# Patient Record
Sex: MALE | Race: WHITE | NOT HISPANIC OR LATINO | Employment: OTHER | URBAN - NONMETROPOLITAN AREA
[De-identification: names, ages, dates, MRNs, and addresses within clinical notes are randomized per-mention and may not be internally consistent; named-entity substitution may affect disease eponyms.]

---

## 2017-10-28 ENCOUNTER — APPOINTMENT (OUTPATIENT)
Dept: CT IMAGING | Facility: HOSPITAL | Age: 37
End: 2017-10-28

## 2017-10-28 ENCOUNTER — HOSPITAL ENCOUNTER (EMERGENCY)
Facility: HOSPITAL | Age: 37
Discharge: ANOTHER HOSPITAL | End: 2017-10-28
Admitting: NURSE PRACTITIONER

## 2017-10-28 ENCOUNTER — APPOINTMENT (OUTPATIENT)
Dept: GENERAL RADIOLOGY | Facility: HOSPITAL | Age: 37
End: 2017-10-28

## 2017-10-28 VITALS
TEMPERATURE: 98.1 F | BODY MASS INDEX: 23.52 KG/M2 | HEIGHT: 71 IN | SYSTOLIC BLOOD PRESSURE: 149 MMHG | DIASTOLIC BLOOD PRESSURE: 91 MMHG | WEIGHT: 168 LBS | HEART RATE: 86 BPM | OXYGEN SATURATION: 95 % | RESPIRATION RATE: 20 BRPM

## 2017-10-28 DIAGNOSIS — S02.113A: ICD-10-CM

## 2017-10-28 DIAGNOSIS — S02.32XA CLOSED FRACTURE OF LEFT ORBITAL FLOOR, INITIAL ENCOUNTER (HCC): Primary | ICD-10-CM

## 2017-10-28 LAB
ALBUMIN SERPL-MCNC: 3.9 G/DL (ref 3.4–4.8)
ALBUMIN/GLOB SERPL: 1.3 G/DL (ref 1.1–1.8)
ALP SERPL-CCNC: 62 U/L (ref 38–126)
ALT SERPL W P-5'-P-CCNC: 51 U/L (ref 21–72)
ANION GAP SERPL CALCULATED.3IONS-SCNC: 10 MMOL/L (ref 5–15)
AST SERPL-CCNC: 37 U/L (ref 17–59)
BASOPHILS # BLD AUTO: 0.03 10*3/MM3 (ref 0–0.2)
BASOPHILS NFR BLD AUTO: 0.4 % (ref 0–2)
BILIRUB SERPL-MCNC: 0.4 MG/DL (ref 0.2–1.3)
BUN BLD-MCNC: 9 MG/DL (ref 7–21)
BUN/CREAT SERPL: 15.5 (ref 7–25)
CALCIUM SPEC-SCNC: 8.9 MG/DL (ref 8.4–10.2)
CHLORIDE SERPL-SCNC: 107 MMOL/L (ref 95–110)
CO2 SERPL-SCNC: 25 MMOL/L (ref 22–31)
CREAT BLD-MCNC: 0.58 MG/DL (ref 0.7–1.3)
DEPRECATED RDW RBC AUTO: 44.7 FL (ref 35.1–43.9)
EOSINOPHIL # BLD AUTO: 0.15 10*3/MM3 (ref 0–0.7)
EOSINOPHIL NFR BLD AUTO: 1.8 % (ref 0–7)
ERYTHROCYTE [DISTWIDTH] IN BLOOD BY AUTOMATED COUNT: 13.3 % (ref 11.5–14.5)
GFR SERPL CREATININE-BSD FRML MDRD: >150 ML/MIN/1.73 (ref 60–162)
GLOBULIN UR ELPH-MCNC: 2.9 GM/DL (ref 2.3–3.5)
GLUCOSE BLD-MCNC: 99 MG/DL (ref 60–100)
HCT VFR BLD AUTO: 44.7 % (ref 39–49)
HGB BLD-MCNC: 15.4 G/DL (ref 13.7–17.3)
HOLD SPECIMEN: NORMAL
HOLD SPECIMEN: NORMAL
IMM GRANULOCYTES # BLD: 0.01 10*3/MM3 (ref 0–0.02)
IMM GRANULOCYTES NFR BLD: 0.1 % (ref 0–0.5)
LYMPHOCYTES # BLD AUTO: 2.21 10*3/MM3 (ref 0.6–4.2)
LYMPHOCYTES NFR BLD AUTO: 26.1 % (ref 10–50)
MCH RBC QN AUTO: 31.4 PG (ref 26.5–34)
MCHC RBC AUTO-ENTMCNC: 34.5 G/DL (ref 31.5–36.3)
MCV RBC AUTO: 91.2 FL (ref 80–98)
MONOCYTES # BLD AUTO: 0.79 10*3/MM3 (ref 0–0.9)
MONOCYTES NFR BLD AUTO: 9.3 % (ref 0–12)
NEUTROPHILS # BLD AUTO: 5.27 10*3/MM3 (ref 2–8.6)
NEUTROPHILS NFR BLD AUTO: 62.3 % (ref 37–80)
PLATELET # BLD AUTO: 206 10*3/MM3 (ref 150–450)
PMV BLD AUTO: 10.5 FL (ref 8–12)
POTASSIUM BLD-SCNC: 4.2 MMOL/L (ref 3.5–5.1)
PROT SERPL-MCNC: 6.8 G/DL (ref 6.3–8.6)
RBC # BLD AUTO: 4.9 10*6/MM3 (ref 4.37–5.74)
SODIUM BLD-SCNC: 142 MMOL/L (ref 137–145)
WBC NRBC COR # BLD: 8.46 10*3/MM3 (ref 3.2–9.8)
WHOLE BLOOD HOLD SPECIMEN: NORMAL
WHOLE BLOOD HOLD SPECIMEN: NORMAL

## 2017-10-28 PROCEDURE — 70486 CT MAXILLOFACIAL W/O DYE: CPT

## 2017-10-28 PROCEDURE — 71020 HC CHEST PA AND LATERAL: CPT

## 2017-10-28 PROCEDURE — 96374 THER/PROPH/DIAG INJ IV PUSH: CPT

## 2017-10-28 PROCEDURE — 85025 COMPLETE CBC W/AUTO DIFF WBC: CPT | Performed by: NURSE PRACTITIONER

## 2017-10-28 PROCEDURE — 25010000002 HYDROMORPHONE PER 4 MG: Performed by: FAMILY MEDICINE

## 2017-10-28 PROCEDURE — 73590 X-RAY EXAM OF LOWER LEG: CPT

## 2017-10-28 PROCEDURE — 70450 CT HEAD/BRAIN W/O DYE: CPT

## 2017-10-28 PROCEDURE — 80053 COMPREHEN METABOLIC PANEL: CPT | Performed by: NURSE PRACTITIONER

## 2017-10-28 PROCEDURE — 72125 CT NECK SPINE W/O DYE: CPT

## 2017-10-28 PROCEDURE — 96376 TX/PRO/DX INJ SAME DRUG ADON: CPT

## 2017-10-28 PROCEDURE — 99284 EMERGENCY DEPT VISIT MOD MDM: CPT

## 2017-10-28 RX ORDER — ZOLPIDEM TARTRATE 10 MG/1
10 TABLET ORAL NIGHTLY PRN
COMMUNITY

## 2017-10-28 RX ORDER — TIZANIDINE 4 MG/1
4 TABLET ORAL NIGHTLY PRN
COMMUNITY
End: 2020-03-04

## 2017-10-28 RX ORDER — HYDROCODONE BITARTRATE AND ACETAMINOPHEN 5; 325 MG/1; MG/1
1 TABLET ORAL ONCE
Status: COMPLETED | OUTPATIENT
Start: 2017-10-28 | End: 2017-10-28

## 2017-10-28 RX ORDER — OMEPRAZOLE 20 MG/1
20 CAPSULE, DELAYED RELEASE ORAL DAILY
COMMUNITY
End: 2020-03-04

## 2017-10-28 RX ORDER — SODIUM CHLORIDE 0.9 % (FLUSH) 0.9 %
10 SYRINGE (ML) INJECTION AS NEEDED
Status: DISCONTINUED | OUTPATIENT
Start: 2017-10-28 | End: 2017-10-29 | Stop reason: HOSPADM

## 2017-10-28 RX ORDER — HYDROCODONE BITARTRATE AND ACETAMINOPHEN 7.5; 325 MG/1; MG/1
1 TABLET ORAL EVERY 6 HOURS PRN
Status: ON HOLD | COMMUNITY
End: 2020-02-05 | Stop reason: HOSPADM

## 2017-10-28 RX ORDER — PROPRANOLOL HYDROCHLORIDE 60 MG/1
60 TABLET ORAL 3 TIMES DAILY
Status: ON HOLD | COMMUNITY
End: 2020-02-05

## 2017-10-28 RX ADMIN — HYDROCODONE BITARTRATE AND ACETAMINOPHEN 1 TABLET: 5; 325 TABLET ORAL at 18:40

## 2017-10-28 RX ADMIN — HYDROMORPHONE HYDROCHLORIDE 0.5 MG: 1 INJECTION, SOLUTION INTRAMUSCULAR; INTRAVENOUS; SUBCUTANEOUS at 20:26

## 2017-10-28 RX ADMIN — HYDROMORPHONE HYDROCHLORIDE 0.5 MG: 1 INJECTION, SOLUTION INTRAMUSCULAR; INTRAVENOUS; SUBCUTANEOUS at 22:03
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
## 2018-10-08 ENCOUNTER — HOSPITAL ENCOUNTER (EMERGENCY)
Facility: HOSPITAL | Age: 38
Discharge: HOME | End: 2018-10-09
Attending: EMERGENCY MEDICINE | Admitting: EMERGENCY MEDICINE

## 2018-10-08 DIAGNOSIS — R10.9 ABDOMINAL PAIN, UNSPECIFIED ABDOMINAL LOCATION: Primary | ICD-10-CM

## 2018-10-08 PROCEDURE — 99284 EMERGENCY DEPT VISIT MOD MDM: CPT

## 2018-10-08 PROCEDURE — 96375 TX/PRO/DX INJ NEW DRUG ADDON: CPT

## 2018-10-08 PROCEDURE — 96374 THER/PROPH/DIAG INJ IV PUSH: CPT

## 2018-10-08 RX ORDER — ONDANSETRON 2 MG/ML
4 INJECTION INTRAMUSCULAR; INTRAVENOUS ONCE
Status: COMPLETED | OUTPATIENT
Start: 2018-10-09 | End: 2018-10-09

## 2018-10-09 ENCOUNTER — APPOINTMENT (OUTPATIENT)
Dept: CT IMAGING | Facility: HOSPITAL | Age: 38
End: 2018-10-09

## 2018-10-09 VITALS
TEMPERATURE: 98.2 F | DIASTOLIC BLOOD PRESSURE: 84 MMHG | SYSTOLIC BLOOD PRESSURE: 128 MMHG | BODY MASS INDEX: 23.18 KG/M2 | WEIGHT: 165.56 LBS | RESPIRATION RATE: 20 BRPM | OXYGEN SATURATION: 98 % | HEIGHT: 71 IN | HEART RATE: 87 BPM

## 2018-10-09 LAB
ALBUMIN SERPL-MCNC: 3.8 G/DL (ref 3.4–4.8)
ALBUMIN/GLOB SERPL: 1.2 G/DL (ref 1.1–1.8)
ALP SERPL-CCNC: 65 U/L (ref 38–126)
ALT SERPL W P-5'-P-CCNC: 36 U/L (ref 21–72)
ANION GAP SERPL CALCULATED.3IONS-SCNC: 8 MMOL/L (ref 5–15)
AST SERPL-CCNC: 21 U/L (ref 17–59)
BASOPHILS # BLD AUTO: 0.02 10*3/MM3 (ref 0–0.2)
BASOPHILS NFR BLD AUTO: 0.2 % (ref 0–2)
BILIRUB SERPL-MCNC: 0.2 MG/DL (ref 0.2–1.3)
BILIRUB UR QL STRIP: NEGATIVE
BUN BLD-MCNC: 17 MG/DL (ref 7–21)
BUN/CREAT SERPL: 27.9 (ref 7–25)
CALCIUM SPEC-SCNC: 9.1 MG/DL (ref 8.4–10.2)
CHLORIDE SERPL-SCNC: 104 MMOL/L (ref 95–110)
CLARITY UR: CLEAR
CO2 SERPL-SCNC: 25 MMOL/L (ref 22–31)
COLOR UR: YELLOW
CREAT BLD-MCNC: 0.61 MG/DL (ref 0.7–1.3)
DEPRECATED RDW RBC AUTO: 43.3 FL (ref 35.1–43.9)
EOSINOPHIL # BLD AUTO: 0.27 10*3/MM3 (ref 0–0.7)
EOSINOPHIL NFR BLD AUTO: 3.3 % (ref 0–7)
ERYTHROCYTE [DISTWIDTH] IN BLOOD BY AUTOMATED COUNT: 13.1 % (ref 11.5–14.5)
GFR SERPL CREATININE-BSD FRML MDRD: 150 ML/MIN/1.73 (ref 70–162)
GLOBULIN UR ELPH-MCNC: 3.1 GM/DL (ref 2.3–3.5)
GLUCOSE BLD-MCNC: 107 MG/DL (ref 60–100)
GLUCOSE UR STRIP-MCNC: NEGATIVE MG/DL
HCT VFR BLD AUTO: 44.2 % (ref 39–49)
HGB BLD-MCNC: 15.4 G/DL (ref 13.7–17.3)
HGB UR QL STRIP.AUTO: NEGATIVE
HOLD SPECIMEN: NORMAL
IMM GRANULOCYTES # BLD: 0.01 10*3/MM3 (ref 0–0.02)
IMM GRANULOCYTES NFR BLD: 0.1 % (ref 0–0.5)
KETONES UR QL STRIP: NEGATIVE
LEUKOCYTE ESTERASE UR QL STRIP.AUTO: NEGATIVE
LIPASE SERPL-CCNC: 130 U/L (ref 23–300)
LYMPHOCYTES # BLD AUTO: 2.82 10*3/MM3 (ref 0.6–4.2)
LYMPHOCYTES NFR BLD AUTO: 34.7 % (ref 10–50)
MCH RBC QN AUTO: 31.8 PG (ref 26.5–34)
MCHC RBC AUTO-ENTMCNC: 34.8 G/DL (ref 31.5–36.3)
MCV RBC AUTO: 91.1 FL (ref 80–98)
MONOCYTES # BLD AUTO: 0.71 10*3/MM3 (ref 0–0.9)
MONOCYTES NFR BLD AUTO: 8.7 % (ref 0–12)
NEUTROPHILS # BLD AUTO: 4.3 10*3/MM3 (ref 2–8.6)
NEUTROPHILS NFR BLD AUTO: 53 % (ref 37–80)
NITRITE UR QL STRIP: NEGATIVE
PH UR STRIP.AUTO: 6 [PH] (ref 5–9)
PLATELET # BLD AUTO: 212 10*3/MM3 (ref 150–450)
PMV BLD AUTO: 10.1 FL (ref 8–12)
POTASSIUM BLD-SCNC: 4.1 MMOL/L (ref 3.5–5.1)
PROT SERPL-MCNC: 6.9 G/DL (ref 6.3–8.6)
PROT UR QL STRIP: NEGATIVE
RBC # BLD AUTO: 4.85 10*6/MM3 (ref 4.37–5.74)
SODIUM BLD-SCNC: 137 MMOL/L (ref 137–145)
SP GR UR STRIP: 1.01 (ref 1–1.03)
UROBILINOGEN UR QL STRIP: NORMAL
WBC NRBC COR # BLD: 8.13 10*3/MM3 (ref 3.2–9.8)
WHOLE BLOOD HOLD SPECIMEN: NORMAL

## 2018-10-09 PROCEDURE — 81003 URINALYSIS AUTO W/O SCOPE: CPT | Performed by: EMERGENCY MEDICINE

## 2018-10-09 PROCEDURE — 96375 TX/PRO/DX INJ NEW DRUG ADDON: CPT

## 2018-10-09 PROCEDURE — 25010000002 MORPHINE PER 10 MG: Performed by: EMERGENCY MEDICINE

## 2018-10-09 PROCEDURE — 25010000002 ONDANSETRON PER 1 MG: Performed by: EMERGENCY MEDICINE

## 2018-10-09 PROCEDURE — 85025 COMPLETE CBC W/AUTO DIFF WBC: CPT | Performed by: EMERGENCY MEDICINE

## 2018-10-09 PROCEDURE — 74177 CT ABD & PELVIS W/CONTRAST: CPT

## 2018-10-09 PROCEDURE — 25010000002 IOPAMIDOL 61 % SOLUTION: Performed by: EMERGENCY MEDICINE

## 2018-10-09 PROCEDURE — 83690 ASSAY OF LIPASE: CPT | Performed by: EMERGENCY MEDICINE

## 2018-10-09 PROCEDURE — 96374 THER/PROPH/DIAG INJ IV PUSH: CPT

## 2018-10-09 PROCEDURE — 0 DIATRIZOATE MEGLUMINE & SODIUM PER 1 ML: Performed by: EMERGENCY MEDICINE

## 2018-10-09 PROCEDURE — 80053 COMPREHEN METABOLIC PANEL: CPT | Performed by: EMERGENCY MEDICINE

## 2018-10-09 RX ORDER — OXYCODONE HYDROCHLORIDE AND ACETAMINOPHEN 5; 325 MG/1; MG/1
1 TABLET ORAL EVERY 6 HOURS PRN
Qty: 12 TABLET | Refills: 0 | Status: ON HOLD | OUTPATIENT
Start: 2018-10-09 | End: 2020-02-05

## 2018-10-09 RX ORDER — ONDANSETRON 4 MG/1
4 TABLET, ORALLY DISINTEGRATING ORAL 4 TIMES DAILY
Qty: 15 TABLET | Refills: 0 | Status: ON HOLD | OUTPATIENT
Start: 2018-10-09 | End: 2020-02-05

## 2018-10-09 RX ADMIN — MORPHINE SULFATE 4 MG: 4 INJECTION, SOLUTION INTRAMUSCULAR; INTRAVENOUS at 00:14

## 2018-10-09 RX ADMIN — IOPAMIDOL 92 ML: 612 INJECTION, SOLUTION INTRAVENOUS at 01:57

## 2018-10-09 RX ADMIN — ONDANSETRON 4 MG: 2 INJECTION INTRAMUSCULAR; INTRAVENOUS at 00:14

## 2018-10-09 RX ADMIN — DIATRIZOATE MEGLUMINE AND DIATRIZOATE SODIUM 30 ML: 660; 100 LIQUID ORAL; RECTAL at 01:57

## 2018-10-09 NOTE — ED PROVIDER NOTES
Subjective   38-year-old male presents the emergency department complaining of right-sided lower back pain that radiates to his right groin.  The pain is been there for several days.  He says that he's had sciatica before and sometimes it comes on like this but said that he is pain is been so severe that he passed out.  He also said that he had a hemorrhoid.  His symptoms are not made better or worse by anything.  Eyes any fevers, chills, nausea, vomiting, diarrhea, chest pain, or shortness of breath.        Back Pain   Location:  Lumbar spine  Quality:  Aching  Radiates to: R groin and testicle.  Pain severity:  Severe  Onset quality:  Unable to specify  Timing:  Constant  Progression:  Worsening  Relieved by:  Nothing  Worsened by:  Nothing  Associated symptoms: no abdominal pain, no chest pain, no dysuria, no fever and no weakness        Review of Systems   Constitutional: Negative for chills, fatigue and fever.   HENT: Negative for congestion and sore throat.    Eyes: Negative for visual disturbance.   Respiratory: Negative for cough and shortness of breath.    Cardiovascular: Negative for chest pain and leg swelling.   Gastrointestinal: Negative for abdominal pain, nausea and vomiting.   Genitourinary: Negative for dysuria.   Musculoskeletal: Positive for back pain.   Skin: Negative for rash.   Neurological: Negative for dizziness and weakness.   Psychiatric/Behavioral: Negative for behavioral problems.   All other systems reviewed and are negative.      History reviewed. No pertinent past medical history.    Allergies   Allergen Reactions   • Chantix [Varenicline]        Past Surgical History:   Procedure Laterality Date   • VASCULAR SURGERY         History reviewed. No pertinent family history.    Social History     Social History   • Marital status: Single     Social History Main Topics   • Smoking status: Current Every Day Smoker     Packs/day: 0.50   • Smokeless tobacco: Never Used   • Alcohol use Yes   •  Drug use: No     Other Topics Concern   • Not on file           Objective   Physical Exam   Constitutional: He is oriented to person, place, and time. He appears well-developed and well-nourished.   HENT:   Head: Normocephalic and atraumatic.   Eyes: Pupils are equal, round, and reactive to light. EOM are normal.   Neck: Normal range of motion. Neck supple.   No midline tenderness   Cardiovascular: Normal rate, regular rhythm, normal heart sounds and intact distal pulses.  Exam reveals no gallop and no friction rub.    No murmur heard.  Pulmonary/Chest: Breath sounds normal. No respiratory distress. He has no wheezes. He has no rales.   No rhonchi   Abdominal: Soft. Bowel sounds are normal. He exhibits no distension. There is no tenderness.   Genitourinary:   Genitourinary Comments: There is tenderness in the right inguinal canal.  The testicle has normal lie and there are no masses.  Cremaster is intact.  There is no obvious penile discharge   Musculoskeletal: Normal range of motion. He exhibits no tenderness or deformity.   There is tenderness on the right flank.  Although it appears a little low for CVA tenderness.   Neurological: He is alert and oriented to person, place, and time. No cranial nerve deficit. He exhibits normal muscle tone. Coordination normal.   Skin: Skin is warm and dry. No rash noted.   Psychiatric: He has a normal mood and affect. His behavior is normal.   Nursing note and vitals reviewed.      Procedures           ED Course  ED Course as of Oct 09 0233   Tue Oct 09, 2018   0137 CT Abdomen Pelvis With Contrast [SW]   0145 Patient signed out to me at change of shift this morning by Dr. James.  He requested I follow-up on the CT scan and urinalysis which are pending.  He has prepared discharge instructions and written prescriptions for Percocet and Zofran.  [DR]   0230 Patient is alert and resting comfortably.  I reviewed all his laboratory data and his CT scan.  I explained to the patient  that it is important he follow-up with a primary care provider and return to the emergency department if symptoms change or worsen.  He told me he has an appointment with his primary care provider on Wednesday, October 10.  [DR]      ED Course User Index  [DR] Shay Gonzalez MD  [SW] Cam James MD        Labs Reviewed   COMPREHENSIVE METABOLIC PANEL - Abnormal; Notable for the following:        Result Value    Glucose 107 (*)     Creatinine 0.61 (*)     BUN/Creatinine Ratio 27.9 (*)     All other components within normal limits   LIPASE - Normal   CBC WITH AUTO DIFFERENTIAL - Normal   URINALYSIS W/ MICROSCOPIC IF INDICATED (NO CULTURE) - Normal    Narrative:     Urine microscopic not indicated.   CBC AND DIFFERENTIAL    Narrative:     The following orders were created for panel order CBC & Differential.  Procedure                               Abnormality         Status                     ---------                               -----------         ------                     CBC Auto Differential[[id number]]        Normal              Final result                 Please view results for these tests on the individual orders.   EXTRA TUBES    Narrative:     The following orders were created for panel order Extra Tubes.  Procedure                               Abnormality         Status                     ---------                               -----------         ------                     Light Blue Top[[id number]]                                   Final result               Gold Top - SST[[id number]]                                   Final result                 Please view results for these tests on the individual orders.   LIGHT BLUE TOP   GOLD TOP - SST     Ct Abdomen Pelvis With Contrast    Result Date: 10/9/2018  Narrative: CT abdomen and pelvis with contrast on 10/9/2018 CLINICAL INDICATION: Right lower quadrant pain, right testicular pain, right lower back pain TECHNIQUE: Multiple axial images are obtained  throughout the abdomen and pelvis following the administration of IV and oral contrast. This exam was performed according to our departmental dose-optimization program, which includes automated exposure control, adjustment of the mA and/or kV according to patient size and/or use of iterative reconstruction technique. Total DLP is 332.4 mGy*cm. COMPARISON: None FINDINGS: Abdomen: The lung bases are clear. The solid abdominal organs are unremarkable. There is no abdominal adenopathy. There is no free fluid or free air within the abdomen. The abdominal portion of the GI tract is unremarkable. Pelvis: There is no free fluid in the pelvis. There is no pelvic adenopathy. Pelvic portion of the GI tract including the appendix is unremarkable. Degenerative changes are noted in the spine.     Impression: No acute abnormality. Electronically signed by:  Octaviano Ruiz  10/9/2018 2:03 AM CDT Workstation: [id number]            MDM      Final diagnoses:   Abdominal pain, unspecified abdominal location            Shay Gonzalez MD  10/09/18 0233

## 2018-10-09 NOTE — ED NOTES
Pt states that he has been experiencing vomiting (which has subsided), abdominal pain, back pain, testicle pain, and severe hemorrhoids since Thursday.       Roxann Yeung, RN  10/08/18 5981

## 2019-03-09 ENCOUNTER — HOSPITAL ENCOUNTER (EMERGENCY)
Facility: HOSPITAL | Age: 39
Discharge: LEFT BEFORE DISCHARGE | End: 2019-03-09

## 2019-03-09 VITALS
OXYGEN SATURATION: 100 % | TEMPERATURE: 97.9 F | SYSTOLIC BLOOD PRESSURE: 143 MMHG | HEIGHT: 71 IN | RESPIRATION RATE: 16 BRPM | WEIGHT: 181.5 LBS | HEART RATE: 104 BPM | BODY MASS INDEX: 25.41 KG/M2 | DIASTOLIC BLOOD PRESSURE: 96 MMHG

## 2019-12-04 ENCOUNTER — HOSPITAL ENCOUNTER (EMERGENCY)
Facility: HOSPITAL | Age: 39
Discharge: LEFT BEFORE DISCHARGE | End: 2019-12-04
Attending: FAMILY MEDICINE | Admitting: FAMILY MEDICINE

## 2019-12-04 ENCOUNTER — APPOINTMENT (OUTPATIENT)
Dept: GENERAL RADIOLOGY | Facility: HOSPITAL | Age: 39
End: 2019-12-04

## 2019-12-04 VITALS
DIASTOLIC BLOOD PRESSURE: 100 MMHG | RESPIRATION RATE: 18 BRPM | BODY MASS INDEX: 25.94 KG/M2 | OXYGEN SATURATION: 99 % | TEMPERATURE: 97.7 F | SYSTOLIC BLOOD PRESSURE: 135 MMHG | HEART RATE: 102 BPM | HEIGHT: 72 IN | WEIGHT: 191.5 LBS

## 2019-12-04 DIAGNOSIS — J81.0 ACUTE PULMONARY EDEMA (HCC): ICD-10-CM

## 2019-12-04 DIAGNOSIS — J18.9 PNEUMONIA OF BOTH LUNGS DUE TO INFECTIOUS ORGANISM, UNSPECIFIED PART OF LUNG: Primary | ICD-10-CM

## 2019-12-04 LAB
ALBUMIN SERPL-MCNC: 3.9 G/DL (ref 3.5–5.2)
ALBUMIN/GLOB SERPL: 1.6 G/DL
ALP SERPL-CCNC: 62 U/L (ref 39–117)
ALT SERPL W P-5'-P-CCNC: 88 U/L (ref 1–41)
ANION GAP SERPL CALCULATED.3IONS-SCNC: 11 MMOL/L (ref 5–15)
ARTERIAL PATENCY WRIST A: ABNORMAL
AST SERPL-CCNC: 109 U/L (ref 1–40)
ATMOSPHERIC PRESS: 745 MMHG
BASE EXCESS BLDA CALC-SCNC: 0.2 MMOL/L (ref 0–2)
BASOPHILS # BLD AUTO: 0.04 10*3/MM3 (ref 0–0.2)
BASOPHILS NFR BLD AUTO: 0.4 % (ref 0–1.5)
BDY SITE: ABNORMAL
BILIRUB SERPL-MCNC: 0.3 MG/DL (ref 0.2–1.2)
BUN BLD-MCNC: 16 MG/DL (ref 6–20)
BUN/CREAT SERPL: 19.8 (ref 7–25)
CALCIUM SPEC-SCNC: 9.2 MG/DL (ref 8.6–10.5)
CHLORIDE SERPL-SCNC: 103 MMOL/L (ref 98–107)
CK SERPL-CCNC: 397 U/L (ref 20–200)
CO2 SERPL-SCNC: 25 MMOL/L (ref 22–29)
CREAT BLD-MCNC: 0.81 MG/DL (ref 0.76–1.27)
DEPRECATED RDW RBC AUTO: 47.9 FL (ref 37–54)
EOSINOPHIL # BLD AUTO: 0.12 10*3/MM3 (ref 0–0.4)
EOSINOPHIL NFR BLD AUTO: 1.3 % (ref 0.3–6.2)
ERYTHROCYTE [DISTWIDTH] IN BLOOD BY AUTOMATED COUNT: 13.9 % (ref 12.3–15.4)
FLUAV AG NPH QL: NEGATIVE
FLUBV AG NPH QL IA: NEGATIVE
GFR SERPL CREATININE-BSD FRML MDRD: 107 ML/MIN/1.73
GLOBULIN UR ELPH-MCNC: 2.4 GM/DL
GLUCOSE BLD-MCNC: 97 MG/DL (ref 65–99)
HCO3 BLDA-SCNC: 23.7 MMOL/L (ref 20–26)
HCT VFR BLD AUTO: 39.6 % (ref 37.5–51)
HGB BLD-MCNC: 13.2 G/DL (ref 13–17.7)
HOLD SPECIMEN: NORMAL
HOLD SPECIMEN: NORMAL
HOROWITZ INDEX BLD+IHG-RTO: 21 %
IMM GRANULOCYTES # BLD AUTO: 0.02 10*3/MM3 (ref 0–0.05)
IMM GRANULOCYTES NFR BLD AUTO: 0.2 % (ref 0–0.5)
LYMPHOCYTES # BLD AUTO: 1.94 10*3/MM3 (ref 0.7–3.1)
LYMPHOCYTES NFR BLD AUTO: 21.7 % (ref 19.6–45.3)
Lab: ABNORMAL
MAGNESIUM SERPL-MCNC: 1.8 MG/DL (ref 1.6–2.6)
MCH RBC QN AUTO: 31.4 PG (ref 26.6–33)
MCHC RBC AUTO-ENTMCNC: 33.3 G/DL (ref 31.5–35.7)
MCV RBC AUTO: 94.3 FL (ref 79–97)
MODALITY: ABNORMAL
MONOCYTES # BLD AUTO: 0.7 10*3/MM3 (ref 0.1–0.9)
MONOCYTES NFR BLD AUTO: 7.8 % (ref 5–12)
NEUTROPHILS # BLD AUTO: 6.11 10*3/MM3 (ref 1.7–7)
NEUTROPHILS NFR BLD AUTO: 68.6 % (ref 42.7–76)
NRBC BLD AUTO-RTO: 0 /100 WBC (ref 0–0.2)
NT-PROBNP SERPL-MCNC: 3136 PG/ML (ref 5–450)
PCO2 BLDA: 33.9 MM HG (ref 35–45)
PH BLDA: 7.45 PH UNITS (ref 7.35–7.45)
PLATELET # BLD AUTO: 191 10*3/MM3 (ref 140–450)
PMV BLD AUTO: 11.1 FL (ref 6–12)
PO2 BLDA: 72.3 MM HG (ref 83–108)
POTASSIUM BLD-SCNC: 4.5 MMOL/L (ref 3.5–5.2)
PROT SERPL-MCNC: 6.3 G/DL (ref 6–8.5)
RBC # BLD AUTO: 4.2 10*6/MM3 (ref 4.14–5.8)
SAO2 % BLDCOA: 96.6 % (ref 94–99)
SODIUM BLD-SCNC: 139 MMOL/L (ref 136–145)
TROPONIN T SERPL-MCNC: 0.01 NG/ML (ref 0–0.03)
VENTILATOR MODE: ABNORMAL
WBC NRBC COR # BLD: 8.93 10*3/MM3 (ref 3.4–10.8)
WHOLE BLOOD HOLD SPECIMEN: NORMAL
WHOLE BLOOD HOLD SPECIMEN: NORMAL

## 2019-12-04 PROCEDURE — 99284 EMERGENCY DEPT VISIT MOD MDM: CPT

## 2019-12-04 PROCEDURE — 83880 ASSAY OF NATRIURETIC PEPTIDE: CPT | Performed by: FAMILY MEDICINE

## 2019-12-04 PROCEDURE — 83735 ASSAY OF MAGNESIUM: CPT | Performed by: FAMILY MEDICINE

## 2019-12-04 PROCEDURE — 82550 ASSAY OF CK (CPK): CPT | Performed by: FAMILY MEDICINE

## 2019-12-04 PROCEDURE — 84484 ASSAY OF TROPONIN QUANT: CPT

## 2019-12-04 PROCEDURE — 87804 INFLUENZA ASSAY W/OPTIC: CPT | Performed by: FAMILY MEDICINE

## 2019-12-04 PROCEDURE — 93005 ELECTROCARDIOGRAM TRACING: CPT

## 2019-12-04 PROCEDURE — 94760 N-INVAS EAR/PLS OXIMETRY 1: CPT

## 2019-12-04 PROCEDURE — 36600 WITHDRAWAL OF ARTERIAL BLOOD: CPT

## 2019-12-04 PROCEDURE — 85025 COMPLETE CBC W/AUTO DIFF WBC: CPT

## 2019-12-04 PROCEDURE — 80053 COMPREHEN METABOLIC PANEL: CPT

## 2019-12-04 PROCEDURE — 71045 X-RAY EXAM CHEST 1 VIEW: CPT

## 2019-12-04 PROCEDURE — 63710000001 PREDNISONE PER 1 MG: Performed by: FAMILY MEDICINE

## 2019-12-04 PROCEDURE — 93010 ELECTROCARDIOGRAM REPORT: CPT | Performed by: INTERNAL MEDICINE

## 2019-12-04 PROCEDURE — 94799 UNLISTED PULMONARY SVC/PX: CPT

## 2019-12-04 PROCEDURE — 82803 BLOOD GASES ANY COMBINATION: CPT

## 2019-12-04 PROCEDURE — 94640 AIRWAY INHALATION TREATMENT: CPT

## 2019-12-04 PROCEDURE — 93005 ELECTROCARDIOGRAM TRACING: CPT | Performed by: FAMILY MEDICINE

## 2019-12-04 RX ORDER — IPRATROPIUM BROMIDE AND ALBUTEROL SULFATE 2.5; .5 MG/3ML; MG/3ML
3 SOLUTION RESPIRATORY (INHALATION) ONCE
Status: COMPLETED | OUTPATIENT
Start: 2019-12-04 | End: 2019-12-04

## 2019-12-04 RX ORDER — SODIUM CHLORIDE 0.9 % (FLUSH) 0.9 %
10 SYRINGE (ML) INJECTION AS NEEDED
Status: DISCONTINUED | OUTPATIENT
Start: 2019-12-04 | End: 2019-12-04

## 2019-12-04 RX ORDER — LEVOFLOXACIN 500 MG/1
750 TABLET, FILM COATED ORAL DAILY
Qty: 9 TABLET | Refills: 0 | Status: ON HOLD | OUTPATIENT
Start: 2019-12-04 | End: 2020-02-05

## 2019-12-04 RX ORDER — ALBUTEROL SULFATE 90 UG/1
2 AEROSOL, METERED RESPIRATORY (INHALATION) EVERY 4 HOURS PRN
Qty: 1 INHALER | Refills: 0 | Status: SHIPPED | OUTPATIENT
Start: 2019-12-04

## 2019-12-04 RX ORDER — FUROSEMIDE 10 MG/ML
40 INJECTION INTRAMUSCULAR; INTRAVENOUS ONCE
Status: DISCONTINUED | OUTPATIENT
Start: 2019-12-04 | End: 2019-12-04

## 2019-12-04 RX ORDER — LEVOFLOXACIN 5 MG/ML
750 INJECTION, SOLUTION INTRAVENOUS ONCE
Status: DISCONTINUED | OUTPATIENT
Start: 2019-12-04 | End: 2019-12-04

## 2019-12-04 RX ORDER — PREDNISONE 20 MG/1
20 TABLET ORAL ONCE
Status: COMPLETED | OUTPATIENT
Start: 2019-12-04 | End: 2019-12-04

## 2019-12-04 RX ORDER — SODIUM CHLORIDE FOR INHALATION 0.9 %
3 VIAL, NEBULIZER (ML) INHALATION ONCE
Status: COMPLETED | OUTPATIENT
Start: 2019-12-04 | End: 2019-12-04

## 2019-12-04 RX ORDER — PREDNISONE 20 MG/1
20 TABLET ORAL 2 TIMES DAILY
Qty: 10 TABLET | Refills: 0 | Status: ON HOLD | OUTPATIENT
Start: 2019-12-04 | End: 2020-02-05

## 2019-12-04 RX ADMIN — ISODIUM CHLORIDE 3 ML: 0.03 SOLUTION RESPIRATORY (INHALATION) at 06:43

## 2019-12-04 RX ADMIN — IPRATROPIUM BROMIDE AND ALBUTEROL SULFATE 3 ML: 2.5; .5 SOLUTION RESPIRATORY (INHALATION) at 08:01

## 2019-12-04 RX ADMIN — PREDNISONE 20 MG: 20 TABLET ORAL at 08:53
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
## 2019-12-04 NOTE — ED PROVIDER NOTES
Subjective   Patient states that he has been short of breath for the past several days, and complains of orthopnea at night.  He admits to smoking and vaping and occasional alcohol use.        Shortness of Breath   Severity:  Mild  Onset quality:  Gradual  Duration:  2 days  Timing:  Intermittent  Progression:  Waxing and waning  Chronicity:  New  Relieved by:  Nothing  Worsened by:  Nothing  Ineffective treatments:  None tried  Associated symptoms: cough, diaphoresis, sore throat and sputum production    Associated symptoms: no abdominal pain, no chest pain, no claudication, no ear pain, no fever, no headaches, no neck pain, no rash, no vomiting and no wheezing    Risk factors: tobacco use    Risk factors: no obesity        Review of Systems   Constitutional: Positive for diaphoresis. Negative for appetite change, chills, fatigue and fever.   HENT: Positive for sore throat. Negative for congestion, ear discharge, ear pain, nosebleeds, rhinorrhea, sinus pressure and trouble swallowing.    Eyes: Negative for discharge and redness.   Respiratory: Positive for cough, sputum production and shortness of breath. Negative for apnea, chest tightness and wheezing.    Cardiovascular: Negative for chest pain and claudication.   Gastrointestinal: Negative for abdominal pain, diarrhea, nausea and vomiting.   Endocrine: Negative for polyuria.   Genitourinary: Negative for dysuria, frequency and urgency.   Musculoskeletal: Negative for myalgias and neck pain.   Skin: Negative for color change and rash.   Allergic/Immunologic: Negative for immunocompromised state.   Neurological: Negative for dizziness, seizures, syncope, weakness, light-headedness and headaches.   Hematological: Negative for adenopathy. Does not bruise/bleed easily.   Psychiatric/Behavioral: Negative for behavioral problems and confusion.   All other systems reviewed and are negative.      Past Medical History:   Diagnosis Date   • Anxiety    • GERD  (gastroesophageal reflux disease)        Allergies   Allergen Reactions   • Chantix [Varenicline]        Past Surgical History:   Procedure Laterality Date   • VASCULAR SURGERY         History reviewed. No pertinent family history.    Social History     Socioeconomic History   • Marital status: Single     Spouse name: Not on file   • Number of children: Not on file   • Years of education: Not on file   • Highest education level: Not on file   Tobacco Use   • Smoking status: Current Every Day Smoker     Packs/day: 0.50   • Smokeless tobacco: Never Used   Substance and Sexual Activity   • Alcohol use: Yes   • Drug use: No   • Sexual activity: Defer           Objective   Physical Exam   Constitutional: He is oriented to person, place, and time. He appears well-developed and well-nourished.   HENT:   Head: Normocephalic and atraumatic.   Nose: Nose normal.   Mouth/Throat: Oropharynx is clear and moist.   Eyes: Conjunctivae and EOM are normal. Pupils are equal, round, and reactive to light. Right eye exhibits no discharge. Left eye exhibits no discharge. No scleral icterus.   Neck: Normal range of motion. Neck supple. No tracheal deviation present.   Cardiovascular: Normal rate, regular rhythm and normal heart sounds.   No murmur heard.  Pulmonary/Chest: Effort normal. No stridor. No respiratory distress. He has no wheezes. He has rhonchi. He has rales.   Abdominal: Soft. Bowel sounds are normal. He exhibits no distension and no mass. There is no tenderness. There is no rebound and no guarding.   Musculoskeletal: He exhibits no edema.   Neurological: He is alert and oriented to person, place, and time. Coordination normal.   Skin: Skin is warm and dry. No rash noted. No erythema.   Psychiatric: He has a normal mood and affect. His behavior is normal. Thought content normal.   Nursing note and vitals reviewed.      ECG 12 Lead    Date/Time: 12/4/2019 9:54 AM  Performed by: Naun Herbert MD  Authorized by: Piedad  Naun WILSON MD   Interpreted by physician  Rhythm: sinus rhythm  Rate: normal  BPM: 92  ST Segments: ST segments normal                   ED Course  ED Course as of Dec 04 1009   Wed Dec 04, 2019   1007 Admission arrangements were made and when the hospitalist came to the ED to evaluate the patient, he told the hospitalist that he was leaving AGAINST MEDICAL ADVICE.  Patient was advised several times to stay for the hospital admission to be further treated for his pulmonary edema and his pneumonia, but he insists on outpatient treatment and refuses to stay in the hospital.  [CB]      ED Course User Index  [CB] Naun Herbert MD          Labs Reviewed   COMPREHENSIVE METABOLIC PANEL - Abnormal; Notable for the following components:       Result Value    ALT (SGPT) 88 (*)     AST (SGOT) 109 (*)     All other components within normal limits    Narrative:     GFR Normal >60  Chronic Kidney Disease <60  Kidney Failure <15   CK - Abnormal; Notable for the following components:    Creatine Kinase 397 (*)     All other components within normal limits   BNP (IN-HOUSE) - Abnormal; Notable for the following components:    proBNP 3,136.0 (*)     All other components within normal limits    Narrative:     Among patients with dyspnea, NT-proBNP is highly sensitive for the detection of acute congestive heart failure. In addition NT-proBNP of <300 pg/ml effectively rules out acute congestive heart failure with 99% negative predictive value.   BLOOD GAS, ARTERIAL - Abnormal; Notable for the following components:    pH, Arterial 7.452 (*)     pCO2, Arterial 33.9 (*)     pO2, Arterial 72.3 (*)     All other components within normal limits   INFLUENZA ANTIGEN, RAPID - Normal   TROPONIN (IN-HOUSE) - Normal    Narrative:     Troponin T Reference Range:  <= 0.03 ng/mL-   Negative for AMI  >0.03 ng/mL-     Abnormal for myocardial necrosis.  Clinicians would have to utilize clinical acumen, EKG, Troponin and serial changes to  determine if it is an Acute Myocardial Infarction or myocardial injury due to an underlying chronic condition.    CBC WITH AUTO DIFFERENTIAL - Normal   MAGNESIUM - Normal   RAINBOW DRAW    Narrative:     The following orders were created for panel order Gervais Draw.  Procedure                               Abnormality         Status                     ---------                               -----------         ------                     Light Blue Top[[id number]]                                   Final result               Green Top (Gel)[[id number]]                                  Final result               Lavender Top[[id number]]                                     Final result               Gold Top - SST[[id number]]                                   Final result                 Please view results for these tests on the individual orders.   BLOOD GAS, ARTERIAL   CBC AND DIFFERENTIAL    Narrative:     The following orders were created for panel order CBC & Differential.  Procedure                               Abnormality         Status                     ---------                               -----------         ------                     CBC Auto Differential[[id number]]        Normal              Final result                 Please view results for these tests on the individual orders.   LIGHT BLUE TOP   GREEN TOP   LAVENDER TOP   GOLD TOP - SST       XR Chest 1 View   Final Result   CONCLUSION:   Airspace opacities, interstitial opacities, and pulmonary   vascular congestion suspicious for pulmonary edema or possibly   pneumonia.   Cardiomegaly.      Electronically signed by:  Alphonso Reese MD  12/4/2019 7:41 AM CST   Workstation: [id number]                    [facility]    Final diagnoses:   Pneumonia of both lungs due to infectious organism, unspecified part of lung   Acute pulmonary edema (CMS/HCC)              Naun Herbert MD  12/04/19 0954       Naun Herbert MD  12/04/19 0955       Piedad  Naun WILSON MD  12/04/19 1000

## 2020-01-13 ENCOUNTER — DOCUMENTATION (OUTPATIENT)
Dept: CARDIAC REHAB | Facility: HOSPITAL | Age: 40
End: 2020-01-13

## 2020-01-13 ENCOUNTER — TRANSCRIBE ORDERS (OUTPATIENT)
Dept: CARDIOLOGY | Facility: CLINIC | Age: 40
End: 2020-01-13

## 2020-01-13 DIAGNOSIS — I50.22 CHRONIC SYSTOLIC HEART FAILURE (HCC): Primary | ICD-10-CM

## 2020-02-04 ENCOUNTER — APPOINTMENT (OUTPATIENT)
Dept: GENERAL RADIOLOGY | Facility: HOSPITAL | Age: 40
End: 2020-02-04

## 2020-02-04 ENCOUNTER — HOSPITAL ENCOUNTER (EMERGENCY)
Facility: HOSPITAL | Age: 40
Discharge: HOME | End: 2020-02-04
Attending: EMERGENCY MEDICINE | Admitting: EMERGENCY MEDICINE

## 2020-02-04 VITALS
HEIGHT: 71 IN | OXYGEN SATURATION: 100 % | HEART RATE: 92 BPM | RESPIRATION RATE: 18 BRPM | TEMPERATURE: 97.6 F | WEIGHT: 177.38 LBS | SYSTOLIC BLOOD PRESSURE: 99 MMHG | DIASTOLIC BLOOD PRESSURE: 75 MMHG | BODY MASS INDEX: 24.83 KG/M2

## 2020-02-04 DIAGNOSIS — R60.9 DEPENDENT EDEMA: Primary | ICD-10-CM

## 2020-02-04 LAB
ALBUMIN SERPL-MCNC: 3.7 G/DL (ref 3.5–5.2)
ALBUMIN/GLOB SERPL: 1.6 G/DL
ALP SERPL-CCNC: 57 U/L (ref 39–117)
ALT SERPL W P-5'-P-CCNC: 148 U/L (ref 1–41)
ANION GAP SERPL CALCULATED.3IONS-SCNC: 12 MMOL/L (ref 5–15)
AST SERPL-CCNC: 134 U/L (ref 1–40)
BASOPHILS # BLD AUTO: 0.03 10*3/MM3 (ref 0–0.2)
BASOPHILS NFR BLD AUTO: 0.4 % (ref 0–1.5)
BILIRUB SERPL-MCNC: 1.1 MG/DL (ref 0.2–1.2)
BUN BLD-MCNC: 14 MG/DL (ref 6–20)
BUN/CREAT SERPL: 14.4 (ref 7–25)
CALCIUM SPEC-SCNC: 8.8 MG/DL (ref 8.6–10.5)
CHLORIDE SERPL-SCNC: 95 MMOL/L (ref 98–107)
CO2 SERPL-SCNC: 23 MMOL/L (ref 22–29)
CREAT BLD-MCNC: 0.97 MG/DL (ref 0.76–1.27)
DEPRECATED RDW RBC AUTO: 43.8 FL (ref 37–54)
EOSINOPHIL # BLD AUTO: 0.03 10*3/MM3 (ref 0–0.4)
EOSINOPHIL NFR BLD AUTO: 0.4 % (ref 0.3–6.2)
ERYTHROCYTE [DISTWIDTH] IN BLOOD BY AUTOMATED COUNT: 13.8 % (ref 12.3–15.4)
GFR SERPL CREATININE-BSD FRML MDRD: 87 ML/MIN/1.73
GLOBULIN UR ELPH-MCNC: 2.3 GM/DL
GLUCOSE BLD-MCNC: 117 MG/DL (ref 65–99)
HCT VFR BLD AUTO: 38.6 % (ref 37.5–51)
HGB BLD-MCNC: 12.5 G/DL (ref 13–17.7)
HOLD SPECIMEN: NORMAL
HOLD SPECIMEN: NORMAL
IMM GRANULOCYTES # BLD AUTO: 0.02 10*3/MM3 (ref 0–0.05)
IMM GRANULOCYTES NFR BLD AUTO: 0.3 % (ref 0–0.5)
LYMPHOCYTES # BLD AUTO: 1.73 10*3/MM3 (ref 0.7–3.1)
LYMPHOCYTES NFR BLD AUTO: 24.9 % (ref 19.6–45.3)
MCH RBC QN AUTO: 28.2 PG (ref 26.6–33)
MCHC RBC AUTO-ENTMCNC: 32.4 G/DL (ref 31.5–35.7)
MCV RBC AUTO: 87.1 FL (ref 79–97)
MONOCYTES # BLD AUTO: 0.62 10*3/MM3 (ref 0.1–0.9)
MONOCYTES NFR BLD AUTO: 8.9 % (ref 5–12)
NEUTROPHILS # BLD AUTO: 4.51 10*3/MM3 (ref 1.7–7)
NEUTROPHILS NFR BLD AUTO: 65.1 % (ref 42.7–76)
NRBC BLD AUTO-RTO: 0 /100 WBC (ref 0–0.2)
NT-PROBNP SERPL-MCNC: 3281 PG/ML (ref 5–450)
PLATELET # BLD AUTO: 235 10*3/MM3 (ref 140–450)
PMV BLD AUTO: 10.8 FL (ref 6–12)
POTASSIUM BLD-SCNC: 4 MMOL/L (ref 3.5–5.2)
PROT SERPL-MCNC: 6 G/DL (ref 6–8.5)
RBC # BLD AUTO: 4.43 10*6/MM3 (ref 4.14–5.8)
SODIUM BLD-SCNC: 130 MMOL/L (ref 136–145)
TROPONIN T SERPL-MCNC: <0.01 NG/ML (ref 0–0.03)
WBC NRBC COR # BLD: 6.94 10*3/MM3 (ref 3.4–10.8)
WHOLE BLOOD HOLD SPECIMEN: NORMAL
WHOLE BLOOD HOLD SPECIMEN: NORMAL

## 2020-02-04 PROCEDURE — 25010000002 FUROSEMIDE PER 20 MG: Performed by: EMERGENCY MEDICINE

## 2020-02-04 PROCEDURE — 93005 ELECTROCARDIOGRAM TRACING: CPT | Performed by: EMERGENCY MEDICINE

## 2020-02-04 PROCEDURE — 80053 COMPREHEN METABOLIC PANEL: CPT | Performed by: EMERGENCY MEDICINE

## 2020-02-04 PROCEDURE — 96374 THER/PROPH/DIAG INJ IV PUSH: CPT

## 2020-02-04 PROCEDURE — 83880 ASSAY OF NATRIURETIC PEPTIDE: CPT | Performed by: EMERGENCY MEDICINE

## 2020-02-04 PROCEDURE — 71045 X-RAY EXAM CHEST 1 VIEW: CPT

## 2020-02-04 PROCEDURE — 84484 ASSAY OF TROPONIN QUANT: CPT | Performed by: EMERGENCY MEDICINE

## 2020-02-04 PROCEDURE — 99284 EMERGENCY DEPT VISIT MOD MDM: CPT

## 2020-02-04 PROCEDURE — 93010 ELECTROCARDIOGRAM REPORT: CPT | Performed by: INTERNAL MEDICINE

## 2020-02-04 PROCEDURE — 85025 COMPLETE CBC W/AUTO DIFF WBC: CPT | Performed by: EMERGENCY MEDICINE

## 2020-02-04 RX ORDER — FUROSEMIDE 10 MG/ML
40 INJECTION INTRAMUSCULAR; INTRAVENOUS ONCE
Status: COMPLETED | OUTPATIENT
Start: 2020-02-04 | End: 2020-02-04

## 2020-02-04 RX ORDER — ASPIRIN 325 MG
325 TABLET ORAL ONCE
Status: DISCONTINUED | OUTPATIENT
Start: 2020-02-04 | End: 2020-02-04 | Stop reason: HOSPADM

## 2020-02-04 RX ORDER — SODIUM CHLORIDE 0.9 % (FLUSH) 0.9 %
10 SYRINGE (ML) INJECTION AS NEEDED
Status: DISCONTINUED | OUTPATIENT
Start: 2020-02-04 | End: 2020-02-04 | Stop reason: HOSPADM

## 2020-02-04 RX ADMIN — FUROSEMIDE 40 MG: 10 INJECTION, SOLUTION INTRAMUSCULAR; INTRAVENOUS at 03:26

## 2020-02-04 NOTE — DISCHARGE INSTRUCTIONS
Please return with new or worsening symptoms.  Follow-up with cardiac rehab tomorrow as planned.  Increase 20 mg Lasix 3 times daily until weight decreases back to baseline.  Then return to 2 times daily.

## 2020-02-04 NOTE — ED PROVIDER NOTES
Subjective   40 year old male presents to the ED with complaint of swelling and possible pacemaker malfunction. His pacemaker is actually a life vest due to severe CHF and cardiomyopathy. He states that it has tried to fire twice but he held the button down and it didn't. He reports 2 lbs weight gain in the last day. Continued shortness of breath is no worse. Denies chest pain. Reports he walked over a mile today and has been doing well with building up his exercise. Has his first appointment with cardiac rehab tomorrow.     Family history, surgical history, social history, current medications and allergies are reviewed with the patient and triage documentation and vitals are reviewed.        History provided by:  Patient and medical records   used: No        Review of Systems   Constitutional: Negative for chills and fever.   HENT: Negative for congestion, sinus pressure and sinus pain.    Eyes: Negative for photophobia and visual disturbance.   Respiratory: Positive for shortness of breath. Negative for cough, chest tightness and wheezing.    Cardiovascular: Positive for leg swelling. Negative for chest pain and palpitations.   Gastrointestinal: Negative for abdominal pain, diarrhea, nausea and vomiting.   Endocrine: Negative for polydipsia, polyphagia and polyuria.   Genitourinary: Negative for dysuria, frequency and urgency.   Musculoskeletal: Negative for arthralgias, back pain, myalgias and neck pain.   Skin: Negative for color change, pallor, rash and wound.   Allergic/Immunologic: Negative.    Neurological: Negative for dizziness, weakness, light-headedness and numbness.   Hematological: Negative.    Psychiatric/Behavioral: Negative.        Past Medical History:   Diagnosis Date   • Anxiety    • GERD (gastroesophageal reflux disease)        Allergies   Allergen Reactions   • Chantix [Varenicline]        Past Surgical History:   Procedure Laterality Date   • VASCULAR SURGERY         No  family history on file.    Social History     Socioeconomic History   • Marital status: Single     Spouse name: Not on file   • Number of children: Not on file   • Years of education: Not on file   • Highest education level: Not on file   Tobacco Use   • Smoking status: Current Every Day Smoker     Packs/day: 0.50   • Smokeless tobacco: Never Used   Substance and Sexual Activity   • Alcohol use: Yes     Comment: occ   • Drug use: No   • Sexual activity: Defer           Objective   Physical Exam   Constitutional: He is oriented to person, place, and time. He appears well-developed and well-nourished. No distress.   HENT:   Head: Normocephalic.   Mouth/Throat: Oropharynx is clear and moist.   Eyes: Pupils are equal, round, and reactive to light. Conjunctivae are normal.   Neck: Normal range of motion.   Cardiovascular: Normal rate and regular rhythm.   Murmur heard.  Pulmonary/Chest: Effort normal and breath sounds normal. No respiratory distress. He has no wheezes. He has no rales.   Abdominal: Soft. Bowel sounds are normal.   Musculoskeletal: Normal range of motion. He exhibits edema (1+ non pitting bilateral lower extremities).   Neurological: He is alert and oriented to person, place, and time.   Skin: Skin is warm and dry. Capillary refill takes less than 2 seconds. He is not diaphoretic.   Nursing note and vitals reviewed.      Procedures  none         ED Course      Labs Reviewed   COMPREHENSIVE METABOLIC PANEL - Abnormal; Notable for the following components:       Result Value    Glucose 117 (*)     Sodium 130 (*)     Chloride 95 (*)     ALT (SGPT) 148 (*)     AST (SGOT) 134 (*)     All other components within normal limits    Narrative:     GFR Normal >60  Chronic Kidney Disease <60  Kidney Failure <15     BNP (IN-HOUSE) - Abnormal; Notable for the following components:    proBNP 3,281.0 (*)     All other components within normal limits    Narrative:     Among patients with dyspnea, NT-proBNP is highly  sensitive for the detection of acute congestive heart failure. In addition NT-proBNP of <300 pg/ml effectively rules out acute congestive heart failure with 99% negative predictive value.    Results may be falsely decreased if patient taking Biotin.     CBC WITH AUTO DIFFERENTIAL - Abnormal; Notable for the following components:    Hemoglobin 12.5 (*)     All other components within normal limits   TROPONIN (IN-HOUSE) - Normal    Narrative:     Troponin T Reference Range:  <= 0.03 ng/mL-   Negative for AMI  >0.03 ng/mL-     Abnormal for myocardial necrosis.  Clinicians would have to utilize clinical acumen, EKG, Troponin and serial changes to determine if it is an Acute Myocardial Infarction or myocardial injury due to an underlying chronic condition.       Results may be falsely decreased if patient taking Biotin.     RAINBOW DRAW    Narrative:     The following orders were created for panel order [place] Draw.  Procedure                               Abnormality         Status                     ---------                               -----------         ------                     Light Blue Top[[id number]]                                   Final result               Green Top (Gel)[[id number]]                                  Final result               Lavender Top[[id number]]                                     Final result               Gold Top - SST[[id number]]                                   Final result                 Please view results for these tests on the individual orders.   CBC AND DIFFERENTIAL    Narrative:     The following orders were created for panel order CBC & Differential.  Procedure                               Abnormality         Status                     ---------                               -----------         ------                     CBC Auto Differential[[id number]]        Abnormal            Final result                 Please view results for these tests on the individual orders.    LIGHT BLUE TOP   GREEN TOP   LAVENDER TOP   GOLD TOP - SST     Xr Chest 2 View    Result Date: 2/5/2020  Narrative: PROCEDURE: Chest PA and lateral REASON FOR EXAM: SOA Triage Protocol FINDINGS: Comparison study dated February 4, 2020. Patient has external vest chest monitoring system in place. Cardiac and pulmonary vasculature are normal . Lungs are clear. Pleural spaces are normal . No acute osseous abnormality.     Impression: 1.  No acute cardiopulmonary abnormality. Electronically signed by:  Bijan Kraus MD  2/5/2020 7:53 AM CST Workstation: [id number]    Xr Chest 1 View    Result Date: 2/4/2020  Narrative: History:  Chest Pain triage protocol Chest Pain Triage Protocol Comparison:  December 4, 2019 Findings:  Portable view of the chest was obtained at 12:45 AM. There is stable cardiac enlargement.  Mild bilateral perihilar inflammation or edema is less severe than what was seen previously.  There is no confluent airspace consolidation or evidence of pleural effusion or pneumothorax.     Impression: Impression:  Cardiac enlargement and mild bilateral perihilar inflammation or edema as above. Electronically signed by:  Hugh Miranda MD  2/4/2020 1:32 AM CST Workstation: [id number]    Us Venous Doppler Lower Extremity Bilateral (duplex)    Result Date: 2/5/2020  Narrative: [facility] Patient Name: SANYA BULLARD ATTENDING: CARLOS BOWSER REFERRING: ELFEGO LYN ORDERING: ELFEGO LYN ----------------------- PROCEDURE: Duplex ultrasound beadiness examination bilateral lower extremities DATE OF EXAM: 2/5/2020 HISTORY: Leg edema, left greater than right Ultrasound grayscale imaging along with color flow Doppler evaluation of the major veins in both lower extremities was performed. FINDINGS: Imaging shows a normal appearance with good compressibility and normal wave augmentation within the common femoral veins, femoral veins, and the popliteal veins bilaterally. Imaging below both knees is  unremarkable. Normal venous flow is seen with Doppler imaging and there are no filling defects or internal echoes to suggest deep venous thrombosis. There is patency with satisfactory flow demonstrated within the greater saphenous veins bilaterally.     Impression: Unremarkable duplex venous Doppler examination of bilateral lower extremities. No evidence of deep venous thrombosis. Electronically signed by:  Damien Bravo MD  2/5/2020 9:40 AM CST Workstation: [id number]    EKG February 4, 2020 at 00 35 reveals sinus tachycardia rate of 106 bpm.  Left ventricular hypertrophy.  No obvious acute ischemia.          MDM  Number of Diagnoses or Management Options  Dependent edema:      Amount and/or Complexity of Data Reviewed  Clinical lab tests: reviewed  Tests in the radiology section of CPT®: reviewed    Patient Progress  Patient progress: stable    No signs of decompensation. Likely dependent edema from being up and exercising more today. No arrhythmias. Discussed with [facility] regarding plan and advised increased lasix for a few days. Then back to BID dose. Will see CHF clinic tomorrow. Agreeable to plan and discharge.     Final diagnoses:   Dependent edema            Brenton Waterman, DO  02/08/20 0338

## 2020-02-05 ENCOUNTER — APPOINTMENT (OUTPATIENT)
Dept: ULTRASOUND IMAGING | Facility: HOSPITAL | Age: 40
End: 2020-02-05

## 2020-02-05 ENCOUNTER — APPOINTMENT (OUTPATIENT)
Dept: CARDIAC REHAB | Facility: HOSPITAL | Age: 40
End: 2020-02-05

## 2020-02-05 ENCOUNTER — HOSPITAL ENCOUNTER (INPATIENT)
Facility: HOSPITAL | Age: 40
LOS: 3 days | Discharge: HOME | End: 2020-02-08
Attending: EMERGENCY MEDICINE | Admitting: HOSPITALIST

## 2020-02-05 ENCOUNTER — APPOINTMENT (OUTPATIENT)
Dept: GENERAL RADIOLOGY | Facility: HOSPITAL | Age: 40
End: 2020-02-05

## 2020-02-05 DIAGNOSIS — I36.1 NONRHEUMATIC TRICUSPID VALVE REGURGITATION: ICD-10-CM

## 2020-02-05 DIAGNOSIS — I10 BENIGN HYPERTENSION: ICD-10-CM

## 2020-02-05 DIAGNOSIS — I50.23 ACUTE ON CHRONIC HFREF (HEART FAILURE WITH REDUCED EJECTION FRACTION) (HCC): Primary | ICD-10-CM

## 2020-02-05 DIAGNOSIS — I50.23 ACUTE ON CHRONIC SYSTOLIC CHF (CONGESTIVE HEART FAILURE) (HCC): ICD-10-CM

## 2020-02-05 LAB
ALBUMIN SERPL-MCNC: 3.6 G/DL (ref 3.5–5.2)
ALBUMIN/GLOB SERPL: 1.6 G/DL
ALP SERPL-CCNC: 51 U/L (ref 39–117)
ALT SERPL W P-5'-P-CCNC: 135 U/L (ref 1–41)
ANION GAP SERPL CALCULATED.3IONS-SCNC: 12 MMOL/L (ref 5–15)
AST SERPL-CCNC: 94 U/L (ref 1–40)
BASOPHILS # BLD AUTO: 0.04 10*3/MM3 (ref 0–0.2)
BASOPHILS NFR BLD AUTO: 0.5 % (ref 0–1.5)
BILIRUB SERPL-MCNC: 0.7 MG/DL (ref 0.2–1.2)
BUN BLD-MCNC: 18 MG/DL (ref 6–20)
BUN/CREAT SERPL: 17.6 (ref 7–25)
CALCIUM SPEC-SCNC: 9 MG/DL (ref 8.6–10.5)
CHLORIDE SERPL-SCNC: 98 MMOL/L (ref 98–107)
CO2 SERPL-SCNC: 24 MMOL/L (ref 22–29)
CREAT BLD-MCNC: 1.02 MG/DL (ref 0.76–1.27)
DEPRECATED RDW RBC AUTO: 44.7 FL (ref 37–54)
EOSINOPHIL # BLD AUTO: 0.12 10*3/MM3 (ref 0–0.4)
EOSINOPHIL NFR BLD AUTO: 1.5 % (ref 0.3–6.2)
ERYTHROCYTE [DISTWIDTH] IN BLOOD BY AUTOMATED COUNT: 13.9 % (ref 12.3–15.4)
GFR SERPL CREATININE-BSD FRML MDRD: 82 ML/MIN/1.73
GLOBULIN UR ELPH-MCNC: 2.2 GM/DL
GLUCOSE BLD-MCNC: 106 MG/DL (ref 65–99)
HCT VFR BLD AUTO: 39 % (ref 37.5–51)
HGB BLD-MCNC: 12.8 G/DL (ref 13–17.7)
HOLD SPECIMEN: NORMAL
HOLD SPECIMEN: NORMAL
IMM GRANULOCYTES # BLD AUTO: 0.02 10*3/MM3 (ref 0–0.05)
IMM GRANULOCYTES NFR BLD AUTO: 0.2 % (ref 0–0.5)
INR PPP: 1.68 (ref 0.8–1.2)
LYMPHOCYTES # BLD AUTO: 2.48 10*3/MM3 (ref 0.7–3.1)
LYMPHOCYTES NFR BLD AUTO: 30.9 % (ref 19.6–45.3)
MCH RBC QN AUTO: 28.7 PG (ref 26.6–33)
MCHC RBC AUTO-ENTMCNC: 32.8 G/DL (ref 31.5–35.7)
MCV RBC AUTO: 87.4 FL (ref 79–97)
MONOCYTES # BLD AUTO: 0.7 10*3/MM3 (ref 0.1–0.9)
MONOCYTES NFR BLD AUTO: 8.7 % (ref 5–12)
NEUTROPHILS # BLD AUTO: 4.66 10*3/MM3 (ref 1.7–7)
NEUTROPHILS NFR BLD AUTO: 58.2 % (ref 42.7–76)
NRBC BLD AUTO-RTO: 0 /100 WBC (ref 0–0.2)
NT-PROBNP SERPL-MCNC: 3219 PG/ML (ref 5–450)
PLATELET # BLD AUTO: 226 10*3/MM3 (ref 140–450)
PMV BLD AUTO: 10.6 FL (ref 6–12)
POTASSIUM BLD-SCNC: 4.3 MMOL/L (ref 3.5–5.2)
PROT SERPL-MCNC: 5.8 G/DL (ref 6–8.5)
PROTHROMBIN TIME: 19.6 SECONDS (ref 11.1–15.3)
RBC # BLD AUTO: 4.46 10*6/MM3 (ref 4.14–5.8)
SODIUM BLD-SCNC: 134 MMOL/L (ref 136–145)
TROPONIN T SERPL-MCNC: <0.01 NG/ML (ref 0–0.03)
WBC NRBC COR # BLD: 8.02 10*3/MM3 (ref 3.4–10.8)
WHOLE BLOOD HOLD SPECIMEN: NORMAL
WHOLE BLOOD HOLD SPECIMEN: NORMAL

## 2020-02-05 PROCEDURE — G0378 HOSPITAL OBSERVATION PER HR: HCPCS

## 2020-02-05 PROCEDURE — 99284 EMERGENCY DEPT VISIT MOD MDM: CPT

## 2020-02-05 PROCEDURE — 93005 ELECTROCARDIOGRAM TRACING: CPT | Performed by: EMERGENCY MEDICINE

## 2020-02-05 PROCEDURE — 85025 COMPLETE CBC W/AUTO DIFF WBC: CPT

## 2020-02-05 PROCEDURE — 93970 EXTREMITY STUDY: CPT

## 2020-02-05 PROCEDURE — 85610 PROTHROMBIN TIME: CPT | Performed by: STUDENT IN AN ORGANIZED HEALTH CARE EDUCATION/TRAINING PROGRAM

## 2020-02-05 PROCEDURE — 71046 X-RAY EXAM CHEST 2 VIEWS: CPT

## 2020-02-05 PROCEDURE — 80053 COMPREHEN METABOLIC PANEL: CPT | Performed by: EMERGENCY MEDICINE

## 2020-02-05 PROCEDURE — 93005 ELECTROCARDIOGRAM TRACING: CPT

## 2020-02-05 PROCEDURE — 83880 ASSAY OF NATRIURETIC PEPTIDE: CPT | Performed by: EMERGENCY MEDICINE

## 2020-02-05 PROCEDURE — 25010000002 FUROSEMIDE PER 20 MG: Performed by: HOSPITALIST

## 2020-02-05 PROCEDURE — 84484 ASSAY OF TROPONIN QUANT: CPT | Performed by: EMERGENCY MEDICINE

## 2020-02-05 RX ORDER — METOPROLOL SUCCINATE 25 MG/1
25 TABLET, EXTENDED RELEASE ORAL NIGHTLY
COMMUNITY
End: 2020-02-08 | Stop reason: HOSPADM

## 2020-02-05 RX ORDER — FUROSEMIDE 20 MG/1
20 TABLET ORAL 2 TIMES DAILY
COMMUNITY
End: 2020-02-08 | Stop reason: HOSPADM

## 2020-02-05 RX ORDER — SODIUM CHLORIDE 0.9 % (FLUSH) 0.9 %
10 SYRINGE (ML) INJECTION EVERY 12 HOURS SCHEDULED
Status: DISCONTINUED | OUTPATIENT
Start: 2020-02-05 | End: 2020-02-08 | Stop reason: HOSPADM

## 2020-02-05 RX ORDER — SODIUM CHLORIDE 0.9 % (FLUSH) 0.9 %
10 SYRINGE (ML) INJECTION AS NEEDED
Status: DISCONTINUED | OUTPATIENT
Start: 2020-02-05 | End: 2020-02-08 | Stop reason: HOSPADM

## 2020-02-05 RX ORDER — LANOLIN ALCOHOL/MO/W.PET/CERES
400 CREAM (GRAM) TOPICAL DAILY
Status: DISCONTINUED | OUTPATIENT
Start: 2020-02-05 | End: 2020-02-07

## 2020-02-05 RX ORDER — TIZANIDINE 4 MG/1
4 TABLET ORAL NIGHTLY PRN
Status: DISCONTINUED | OUTPATIENT
Start: 2020-02-05 | End: 2020-02-08 | Stop reason: HOSPADM

## 2020-02-05 RX ORDER — WARFARIN SODIUM 7.5 MG/1
7.5 TABLET ORAL NIGHTLY
Status: DISCONTINUED | OUTPATIENT
Start: 2020-02-05 | End: 2020-02-06

## 2020-02-05 RX ORDER — ALBUTEROL SULFATE 2.5 MG/3ML
2.5 SOLUTION RESPIRATORY (INHALATION) EVERY 6 HOURS PRN
Status: DISCONTINUED | OUTPATIENT
Start: 2020-02-05 | End: 2020-02-08 | Stop reason: HOSPADM

## 2020-02-05 RX ORDER — FUROSEMIDE 10 MG/ML
40 INJECTION INTRAMUSCULAR; INTRAVENOUS EVERY 12 HOURS
Status: DISCONTINUED | OUTPATIENT
Start: 2020-02-05 | End: 2020-02-08 | Stop reason: HOSPADM

## 2020-02-05 RX ORDER — CALCIUM CARBONATE 1250 MG/5ML
650 SUSPENSION ORAL 2 TIMES DAILY
COMMUNITY

## 2020-02-05 RX ORDER — CALCIUM CARBONATE 200(500)MG
2 TABLET,CHEWABLE ORAL DAILY
Status: DISCONTINUED | OUTPATIENT
Start: 2020-02-05 | End: 2020-02-08 | Stop reason: HOSPADM

## 2020-02-05 RX ORDER — FUROSEMIDE 20 MG/1
20 TABLET ORAL 2 TIMES DAILY
Status: DISCONTINUED | OUTPATIENT
Start: 2020-02-05 | End: 2020-02-05 | Stop reason: SDUPTHER

## 2020-02-05 RX ORDER — ZOLPIDEM TARTRATE 5 MG/1
10 TABLET ORAL NIGHTLY PRN
Status: DISCONTINUED | OUTPATIENT
Start: 2020-02-05 | End: 2020-02-08 | Stop reason: HOSPADM

## 2020-02-05 RX ORDER — WARFARIN SODIUM 7.5 MG/1
7.5 TABLET ORAL NIGHTLY
Status: ON HOLD | COMMUNITY
End: 2020-07-23

## 2020-02-05 RX ORDER — CALCIUM CARBONATE 500(1250)
1000 TABLET ORAL DAILY
Status: DISCONTINUED | OUTPATIENT
Start: 2020-02-05 | End: 2020-02-05 | Stop reason: CLARIF

## 2020-02-05 RX ORDER — ERGOCALCIFEROL 1.25 MG/1
50000 CAPSULE ORAL
Status: ON HOLD | COMMUNITY
End: 2021-07-31

## 2020-02-05 RX ORDER — PANTOPRAZOLE SODIUM 40 MG/1
40 TABLET, DELAYED RELEASE ORAL EVERY MORNING
Status: DISCONTINUED | OUTPATIENT
Start: 2020-02-06 | End: 2020-02-08 | Stop reason: HOSPADM

## 2020-02-05 RX ORDER — METOPROLOL SUCCINATE 25 MG/1
25 TABLET, EXTENDED RELEASE ORAL NIGHTLY
Status: DISCONTINUED | OUTPATIENT
Start: 2020-02-05 | End: 2020-02-07

## 2020-02-05 RX ADMIN — CALCIUM CARBONATE (ANTACID) CHEW TAB 500 MG 2 TABLET: 500 CHEW TAB at 21:28

## 2020-02-05 RX ADMIN — FUROSEMIDE 40 MG: 10 INJECTION, SOLUTION INTRAMUSCULAR; INTRAVENOUS at 21:28

## 2020-02-05 RX ADMIN — SODIUM CHLORIDE, PRESERVATIVE FREE 10 ML: 5 INJECTION INTRAVENOUS at 21:28

## 2020-02-05 RX ADMIN — METOPROLOL SUCCINATE 25 MG: 25 TABLET, FILM COATED, EXTENDED RELEASE ORAL at 21:28

## 2020-02-05 RX ADMIN — WARFARIN SODIUM 7.5 MG: 7.5 TABLET ORAL at 21:28

## 2020-02-05 RX ADMIN — Medication 400 MG: at 21:28

## 2020-02-05 NOTE — ED TRIAGE NOTES
Pt. Presents to ED with shortness of breath and leg pain that started at midnight. Pt. Ambulates to bed. AAOX4. Airway patent. Breathing labored but adequate. No active bleeding.

## 2020-02-05 NOTE — ED PROVIDER NOTES
Shay Velásquez presents with his father to the ED with complaints of bilateral leg pain, mild shortness of breath, left breast/axilla pain, and persistent left lower leg edema.  He has his LifeVest in place.  He also states he has had off-and-on nausea overnight.  He was seen here yesterday for leg pain, and instructed to increase his Lasix to try to get the fluid off of his leg which he did.  His blood pressure is marginal at 88/69, and he feels fatigued.  He had some varicose vein stripping done on his leg in 2015 or 2016.  He says his leg is never swelled up like this before.  He is currently on Coumadin for a left ventricular thrombus discovered on echo in December.        History provided by:  Parent and patient   used: No    Shortness of Breath   Severity:  Mild  Onset quality:  Gradual  Duration:  7 hours  Timing:  Constant  Progression:  Unchanged  Chronicity:  Chronic  Relieved by:  Rest  Worsened by:  Movement  Ineffective treatments:  Lying down  Associated symptoms: chest pain    Associated symptoms: no abdominal pain, no ear pain, no headaches and no rash    Risk factors comment:  HFrEF  Leg Pain   Location:  Leg  Time since incident:  7 hours  Injury: no    Leg pain location: entire leg, unable to determine if if radiates from foot up or groin down.  Pain details:     Quality:  Tingling    Radiates to:  Does not radiate    Severity:  Moderate    Onset quality:  Gradual    Duration:  7 hours    Timing:  Constant    Progression:  Unchanged  Chronicity:  Chronic  Dislocation: no    Relieved by:  Nothing  Worsened by:  Nothing  Ineffective treatments:  Rest  Associated symptoms: fatigue    Associated symptoms: no back pain        Review of Systems   Constitutional: Positive for fatigue. Negative for activity change and appetite change.   HENT: Negative for congestion and ear pain.    Eyes: Negative for pain and discharge.   Respiratory: Positive for shortness of breath. Negative for  "chest tightness.    Cardiovascular: Positive for chest pain and leg swelling (left leg edema). Negative for palpitations.   Gastrointestinal: Negative for abdominal distention and abdominal pain.   Endocrine: Negative for cold intolerance and heat intolerance.   Genitourinary: Negative for difficulty urinating and dysuria.   Musculoskeletal: Negative for arthralgias and back pain.        Bilateral leg pain, L>R, with numbness/tingling. Left leg edema \"improved\" since yesterday   Skin: Negative for color change and rash.   Allergic/Immunologic: Negative for environmental allergies and food allergies.   Neurological: Negative for dizziness and headaches.   Hematological: Negative for adenopathy. Does not bruise/bleed easily.   Psychiatric/Behavioral: Negative for agitation and confusion.       Past Medical History:   Diagnosis Date   • Anxiety    • GERD (gastroesophageal reflux disease)        Allergies   Allergen Reactions   • Chantix [Varenicline]        Past Surgical History:   Procedure Laterality Date   • VASCULAR SURGERY         History reviewed. No pertinent family history.    Social History     Socioeconomic History   • Marital status: Single     Spouse name: Not on file   • Number of children: Not on file   • Years of education: Not on file   • Highest education level: Not on file   Tobacco Use   • Smoking status: Current Every Day Smoker     Packs/day: 0.50   • Smokeless tobacco: Never Used   Substance and Sexual Activity   • Alcohol use: Yes   • Drug use: No   • Sexual activity: Defer           Objective   Physical Exam   Constitutional: He is oriented to person, place, and time. He appears well-developed and well-nourished.   HENT:   Head: Normocephalic and atraumatic.   Eyes: Pupils are equal, round, and reactive to light. EOM are normal.   Neck: Neck supple. No tracheal deviation present. No thyromegaly present.   Cardiovascular: Normal rate, S1 normal, S2 normal, normal heart sounds and intact distal " pulses.   Pulses:       Radial pulses are 2+ on the left side.        Dorsalis pedis pulses are 2+ on the right side, and 2+ on the left side.   Pulmonary/Chest: Breath sounds normal. Accessory muscle usage present. He exhibits tenderness (to left breast/axilla).   Abdominal: Soft.   Musculoskeletal: Normal range of motion.        Left lower leg: He exhibits edema (trace pitting).   Neurological: He is alert and oriented to person, place, and time. GCS eye subscore is 4. GCS verbal subscore is 5. GCS motor subscore is 6.   Skin: Skin is warm and dry. Capillary refill takes 2 to 3 seconds.   Psychiatric: He has a normal mood and affect. His speech is normal and behavior is normal. Thought content normal.       Procedures           ED Course  ED Course as of Feb 05 1045   Wed Feb 05, 2020   0704 XR Chest 2 View [PC]   1019 Didier received a chest x-ray, lab work, and a venous duplex of his lower extremities for his unilateral left leg edema.  His blood pressure improved over his stay, as he was hypotensive on arrival with systolic pressures in the upper 80s.  He continues to be short of breath, with mildly labored respirations.  His INR remains subtherapeutic at 1.68.  He will need to be diuresed, and decision was made to admit to the hospitalist for ops.    [ADELAIDA]      ED Course User Index  [ADELAIDA] Giselle Madrid MD  [PC] Triston Reeves MD      Lab Results   Component Value Date    WBC 8.02 02/05/2020    HGB 12.8 (L) 02/05/2020    HCT 39.0 02/05/2020    MCV 87.4 02/05/2020     02/05/2020     Lab Results   Component Value Date    GLUCOSE 106 (H) 02/05/2020    BUN 18 02/05/2020    CREATININE 1.02 02/05/2020    EGFRIFNONA 82 02/05/2020    BCR 17.6 02/05/2020    K 4.3 02/05/2020    CO2 24.0 02/05/2020    CALCIUM 9.0 02/05/2020    ALBUMIN 3.60 02/05/2020    LABIL2 1.6 12/25/2019    AST 94 (H) 02/05/2020     (H) 02/05/2020     Lab Results   Component Value Date    INR 1.68 (H) 02/05/2020    INR 1.5  01/24/2020    INR 1.1 07/21/2014    PROTIME 19.6 (H) 02/05/2020    PROTIME 13.6 07/21/2014     BNP 3,219                                         MDM  Number of Diagnoses or Management Options  Acute on chronic HFrEF (heart failure with reduced ejection fraction) (CMS/[facility]):      Amount and/or Complexity of Data Reviewed  Clinical lab tests: reviewed  Tests in the radiology section of CPT®: reviewed  Decide to obtain previous medical records or to obtain history from someone other than the patient: yes    Risk of Complications, Morbidity, and/or Mortality  Presenting problems: low  Diagnostic procedures: minimal  Management options: low  General comments: Didier will need diuresing in a controlled environment.  Given his hypotension on arrival with systolic readings in the upper 80s, he will need to have frequent vital signs and monitoring during his diuresis.  Decision to admit to ops for his diuresing.    Patient Progress  Patient progress: stable      Final diagnoses:   Acute on chronic HFrEF (heart failure with reduced ejection fraction) (CMS/[facility])         This document has been electronically signed by Giselle Madrid MD on February 5, 2020 10:45 AM         Giselle Madrid MD  Resident  02/05/20 1027       Giselle Madrid MD  Resident  02/05/20 6221

## 2020-02-06 ENCOUNTER — APPOINTMENT (OUTPATIENT)
Dept: CARDIOLOGY | Facility: HOSPITAL | Age: 40
End: 2020-02-06

## 2020-02-06 LAB
BH CV ECHO MEAS - ACS: 2.7 CM
BH CV ECHO MEAS - AO MAX PG (FULL): 0.4 MMHG
BH CV ECHO MEAS - AO MAX PG: 1.3 MMHG
BH CV ECHO MEAS - AO MEAN PG (FULL): 0 MMHG
BH CV ECHO MEAS - AO MEAN PG: 1 MMHG
BH CV ECHO MEAS - AO ROOT AREA (BSA CORRECTED): 1.8
BH CV ECHO MEAS - AO ROOT AREA: 10.8 CM^2
BH CV ECHO MEAS - AO ROOT DIAM: 3.7 CM
BH CV ECHO MEAS - AO V2 MAX: 56.9 CM/SEC
BH CV ECHO MEAS - AO V2 MEAN: 43.6 CM/SEC
BH CV ECHO MEAS - AO V2 VTI: 7.9 CM
BH CV ECHO MEAS - ASC AORTA: 2.9 CM
BH CV ECHO MEAS - AVA(I,A): 4.4 CM^2
BH CV ECHO MEAS - AVA(I,D): 4.4 CM^2
BH CV ECHO MEAS - AVA(V,A): 4.4 CM^2
BH CV ECHO MEAS - AVA(V,D): 4.4 CM^2
BH CV ECHO MEAS - BSA(HAYCOCK): 2.1 M^2
BH CV ECHO MEAS - BSA: 2.1 M^2
BH CV ECHO MEAS - BZI_BMI: 26.2 KILOGRAMS/M^2
BH CV ECHO MEAS - BZI_METRIC_HEIGHT: 180.3 CM
BH CV ECHO MEAS - BZI_METRIC_WEIGHT: 85.3 KG
BH CV ECHO MEAS - EDV(CUBED): 367.1 ML
BH CV ECHO MEAS - EDV(MOD-SP2): 249 ML
BH CV ECHO MEAS - EDV(MOD-SP4): 291 ML
BH CV ECHO MEAS - EDV(TEICH): 268.8 ML
BH CV ECHO MEAS - EF(CUBED): 8.1 %
BH CV ECHO MEAS - EF(MOD-SP2): 5.2 %
BH CV ECHO MEAS - EF(MOD-SP4): 13.4 %
BH CV ECHO MEAS - EF(TEICH): 6.2 %
BH CV ECHO MEAS - EPSS: 3.3 CM
BH CV ECHO MEAS - ESV(CUBED): 337.2 ML
BH CV ECHO MEAS - ESV(MOD-SP2): 236 ML
BH CV ECHO MEAS - ESV(MOD-SP4): 252 ML
BH CV ECHO MEAS - ESV(TEICH): 252.1 ML
BH CV ECHO MEAS - FS: 2.8 %
BH CV ECHO MEAS - IVS/LVPW: 0.95
BH CV ECHO MEAS - IVSD: 0.88 CM
BH CV ECHO MEAS - LA DIMENSION: 5.9 CM
BH CV ECHO MEAS - LA/AO: 1.6
BH CV ECHO MEAS - LV DIASTOLIC VOL/BSA (35-75): 141.7 ML/M^2
BH CV ECHO MEAS - LV MASS(C)D: 294.1 GRAMS
BH CV ECHO MEAS - LV MASS(C)DI: 143.2 GRAMS/M^2
BH CV ECHO MEAS - LV MAX PG: 0.89 MMHG
BH CV ECHO MEAS - LV MEAN PG: 1 MMHG
BH CV ECHO MEAS - LV SYSTOLIC VOL/BSA (12-30): 122.7 ML/M^2
BH CV ECHO MEAS - LV V1 MAX: 47.3 CM/SEC
BH CV ECHO MEAS - LV V1 MEAN: 33.8 CM/SEC
BH CV ECHO MEAS - LV V1 VTI: 6.6 CM
BH CV ECHO MEAS - LVIDD: 7.2 CM
BH CV ECHO MEAS - LVIDS: 7 CM
BH CV ECHO MEAS - LVLD AP2: 10.2 CM
BH CV ECHO MEAS - LVLD AP4: 9.7 CM
BH CV ECHO MEAS - LVLS AP2: 9.5 CM
BH CV ECHO MEAS - LVLS AP4: 8.8 CM
BH CV ECHO MEAS - LVOT AREA (M): 5.3 CM^2
BH CV ECHO MEAS - LVOT AREA: 5.3 CM^2
BH CV ECHO MEAS - LVOT DIAM: 2.6 CM
BH CV ECHO MEAS - LVPWD: 0.92 CM
BH CV ECHO MEAS - MR MAX PG: 63.4 MMHG
BH CV ECHO MEAS - MR MAX VEL: 398 CM/SEC
BH CV ECHO MEAS - MV A MAX VEL: 44.9 CM/SEC
BH CV ECHO MEAS - MV DEC SLOPE: 386 CM/SEC^2
BH CV ECHO MEAS - MV E MAX VEL: 76.4 CM/SEC
BH CV ECHO MEAS - MV E/A: 1.7
BH CV ECHO MEAS - MV P1/2T MAX VEL: 80.8 CM/SEC
BH CV ECHO MEAS - MV P1/2T: 61.3 MSEC
BH CV ECHO MEAS - MVA P1/2T LCG: 2.7 CM^2
BH CV ECHO MEAS - MVA(P1/2T): 3.6 CM^2
BH CV ECHO MEAS - PA MAX PG (FULL): 0.79 MMHG
BH CV ECHO MEAS - PA MAX PG: 1.2 MMHG
BH CV ECHO MEAS - PA V2 MAX: 55.8 CM/SEC
BH CV ECHO MEAS - RV MAX PG: 0.46 MMHG
BH CV ECHO MEAS - RV MEAN PG: 0 MMHG
BH CV ECHO MEAS - RV V1 MAX: 33.9 CM/SEC
BH CV ECHO MEAS - RV V1 MEAN: 22.3 CM/SEC
BH CV ECHO MEAS - RV V1 VTI: 4.7 CM
BH CV ECHO MEAS - RVDD: 4 CM
BH CV ECHO MEAS - SI(AO): 41.6 ML/M^2
BH CV ECHO MEAS - SI(CUBED): 14.6 ML/M^2
BH CV ECHO MEAS - SI(LVOT): 17.1 ML/M^2
BH CV ECHO MEAS - SI(MOD-SP2): 6.3 ML/M^2
BH CV ECHO MEAS - SI(MOD-SP4): 19 ML/M^2
BH CV ECHO MEAS - SI(TEICH): 8.1 ML/M^2
BH CV ECHO MEAS - SV(AO): 85.4 ML
BH CV ECHO MEAS - SV(CUBED): 29.9 ML
BH CV ECHO MEAS - SV(LVOT): 35 ML
BH CV ECHO MEAS - SV(MOD-SP2): 13 ML
BH CV ECHO MEAS - SV(MOD-SP4): 39 ML
BH CV ECHO MEAS - SV(TEICH): 16.6 ML
BH CV ECHO MEAS - TR MAX VEL: 267 CM/SEC
INR PPP: 1.55 (ref 0.8–1.2)
MAXIMAL PREDICTED HEART RATE: 182 BPM
PROTHROMBIN TIME: 18.4 SECONDS (ref 11.1–15.3)
STRESS TARGET HR: 155 BPM

## 2020-02-06 PROCEDURE — 93306 TTE W/DOPPLER COMPLETE: CPT

## 2020-02-06 PROCEDURE — G0378 HOSPITAL OBSERVATION PER HR: HCPCS

## 2020-02-06 PROCEDURE — 25010000002 FUROSEMIDE PER 20 MG: Performed by: HOSPITALIST

## 2020-02-06 PROCEDURE — 25010000002 PERFLUTREN (DEFINITY) 8.476 MG IN SODIUM CHLORIDE 0.9 % 10 ML INJECTION: Performed by: HOSPITALIST

## 2020-02-06 PROCEDURE — 85610 PROTHROMBIN TIME: CPT | Performed by: HOSPITALIST

## 2020-02-06 RX ORDER — WARFARIN SODIUM 10 MG/1
10 TABLET ORAL NIGHTLY
Status: DISCONTINUED | OUTPATIENT
Start: 2020-02-06 | End: 2020-02-08 | Stop reason: HOSPADM

## 2020-02-06 RX ADMIN — PANTOPRAZOLE SODIUM 40 MG: 40 TABLET, DELAYED RELEASE ORAL at 05:50

## 2020-02-06 RX ADMIN — Medication 400 MG: at 10:26

## 2020-02-06 RX ADMIN — SODIUM CHLORIDE, PRESERVATIVE FREE 10 ML: 5 INJECTION INTRAVENOUS at 20:49

## 2020-02-06 RX ADMIN — PANTOPRAZOLE SODIUM 40 MG: 40 TABLET, DELAYED RELEASE ORAL at 10:26

## 2020-02-06 RX ADMIN — METOPROLOL SUCCINATE 25 MG: 25 TABLET, FILM COATED, EXTENDED RELEASE ORAL at 20:49

## 2020-02-06 RX ADMIN — Medication 12.5 MG: at 12:56

## 2020-02-06 RX ADMIN — FUROSEMIDE 40 MG: 10 INJECTION, SOLUTION INTRAMUSCULAR; INTRAVENOUS at 20:49

## 2020-02-06 RX ADMIN — CALCIUM CARBONATE (ANTACID) CHEW TAB 500 MG 2 TABLET: 500 CHEW TAB at 10:26

## 2020-02-06 RX ADMIN — PERFLUTREN 1.5 ML: 6.52 INJECTION, SUSPENSION INTRAVENOUS at 10:30

## 2020-02-06 RX ADMIN — WARFARIN SODIUM 10 MG: 10 TABLET ORAL at 20:49

## 2020-02-06 RX ADMIN — FUROSEMIDE 40 MG: 10 INJECTION, SOLUTION INTRAMUSCULAR; INTRAVENOUS at 10:26

## 2020-02-06 RX ADMIN — SODIUM CHLORIDE, PRESERVATIVE FREE 10 ML: 5 INJECTION INTRAVENOUS at 10:27

## 2020-02-06 NOTE — PROGRESS NOTES
"Anticoagulation by Pharmacy - Warfarin    Didier Dahl is a 40 y.o.male being continued on warfarin for other, full anticoagulation    Home regimen: 10 mg M/F, 7.5 mg all other days  INR Goal: 2-3  Last INR:   Lab Results   Component Value Date    INR 1.55 (H) 02/06/2020       Objective:  [Ht: 180.3 cm (71\"); Wt: 85.5 kg (188 lb 9.6 oz)]  Lab Results   Component Value Date    INR 1.55 (H) 02/06/2020    INR 1.68 (H) 02/05/2020    INR 1.5 01/24/2020    PROTIME 18.4 (H) 02/06/2020    PROTIME 19.6 (H) 02/05/2020    PROTIME 13.6 07/21/2014     Lab Results   Component Value Date    HGB 12.8 (L) 02/05/2020    HGB 12.5 (L) 02/04/2020    HGB 12.3 (L) 12/23/2019    HCT 39.0 02/05/2020    HCT 38.6 02/04/2020    HCT 36.8 (L) 12/23/2019     02/05/2020     02/04/2020     12/23/2019       Recent Warfarin Administrations       The 5 most recent administrations since 01/30/2020 are shown below each listed medication.    Warfarin Sodium         Order Dose Date Given     warfarin (COUMADIN) tablet 7.5 mg 7.5 mg 02/05/2020                      Assessment  Interacting medications: None  INR is subtherapeutic and trending down.  H/H is stable.  No mention of bleed noted.  Dosing history taken from [facility].    Will give patient 10 mg dose today.  Patient's INR trending down from yesterday despite 7.5 mg dose.  Unsure if patient was compliant outpatient.    Plan:  1.  Give warfarin 10 mg tablet PO @ 1800 tonight  2.  Draw a PT/INR in AM  3.  Pharmacy will continue to follow    Ernie Abdi [facility]  02/06/20 10:40 AM    "

## 2020-02-06 NOTE — H&P
[facility] Medicine Admission      Date of Admission: 2/5/2020      Primary Care Physician: Provider, No Known      Chief Complaint: Shortness of breath and leg pain    HPI: Patient is a 40-year-old  male with past medical history of severe congestive heart failure requiring a LifeVest.  He presented emergency department with complaints of worsening lower extremity edema and pain.  He has been increasingly short of air reports that he is unable to lie flat.  He states that he thinks he has obstructive sleep apnea because he wakes up suddenly frequently in the night not being able to breathe.  He has been undergoing cardiac rehab, but was unable to continue with that today due to his worsening respiratory status.    Concurrent Medical History:  has a past medical history of Anxiety and GERD (gastroesophageal reflux disease).  Congestive heart failure with an ejection fraction reported to be 10%.    Past Surgical History:  has a past surgical history that includes Vascular surgery.    Family History: Congestive heart failure    Social History:  reports that he has been smoking. He has been smoking about 0.50 packs per day. He has never used smokeless tobacco. He reports that he drinks alcohol. He reports that he does not use drugs.    Allergies:   Allergies   Allergen Reactions   • Chantix [Varenicline]        Medications:   Prior to Admission medications    Medication Sig Start Date End Date Taking? Authorizing Provider   Calcium Carbonate Antacid 1250 MG/5ML Take  by mouth.   Yes Lena Cabrera MD   furosemide (LASIX) 20 MG tablet Take 20 mg by mouth 2 (Two) Times a Day.   Yes Lena Cabrera MD   lisdexamfetamine (VYVANSE) 70 MG capsule Take 70 mg by mouth Every Morning Pt takes 1/2 in the morning and 1/2 in the afternoon    Yes Lena Cabrera MD   magnesium oxide (MAGOX) 400 (241.3 Mg) MG tablet tablet Take 400 mg by mouth Daily.   Yes  Lena Cabrera MD   metoprolol succinate XL (TOPROL-XL) 25 MG 24 hr tablet Take 25 mg by mouth Every Night.   Yes Lena Cabrera MD   omeprazole (priLOSEC) 20 MG capsule Take 20 mg by mouth Daily.   Yes Lena Cabrera MD   tiZANidine (ZANAFLEX) 4 MG tablet Take 4 mg by mouth At Night As Needed for Muscle Spasms.   Yes Lena Cabrera MD   vitamin D (ERGOCALCIFEROL) 1.25 MG (31638 UT) capsule capsule Take 50,000 Units by mouth Every 7 (Seven) Days.   Yes Lena Cabrera MD   warfarin (COUMADIN) 7.5 MG tablet Take 7.5 mg by mouth Every Night.   Yes Lena Cabrera MD   zolpidem (AMBIEN) 10 MG tablet Take 10 mg by mouth At Night As Needed for Sleep.   Yes Lena Cabrera MD   albuterol sulfate  (90 Base) MCG/ACT inhaler Inhale 2 puffs Every 4 (Four) Hours As Needed for Wheezing. 12/4/19   Naun Herbert MD   HYDROcodone-acetaminophen (NORCO) 7.5-325 MG per tablet Take 1 tablet by mouth Every 6 (Six) Hours As Needed for Moderate Pain .  2/5/20  Lena Cabrera MD   levoFLOXacin (LEVAQUIN) 500 MG tablet Take 1.5 tablets by mouth Daily. 12/4/19 2/5/20  Naun Herbert MD   ondansetron ODT (ZOFRAN-ODT) 4 MG disintegrating tablet Take 1 tablet by mouth 4 (Four) Times a Day. 10/9/18 2/5/20  Cam James MD   oxyCODONE-acetaminophen (PERCOCET) 5-325 MG per tablet Take 1 tablet by mouth Every 6 (Six) Hours As Needed for Severe Pain . 10/9/18 2/5/20  Cam James MD   predniSONE (DELTASONE) 20 MG tablet Take 1 tablet by mouth 2 (Two) Times a Day. 12/4/19 2/5/20  Naun Herbert MD   propranolol (INDERAL) 60 MG tablet Take 60 mg by mouth 3 (Three) Times a Day.  2/5/20  Lena Cabrera MD       Review of Systems:  Review of Systems   Constitutional: Positive for activity change, appetite change and fatigue. Negative for chills, fever and unexpected weight change.   HENT: Negative for congestion, facial swelling, hearing loss, nosebleeds,  rhinorrhea, sneezing, trouble swallowing and voice change.    Eyes: Negative for photophobia and visual disturbance.   Respiratory: Positive for shortness of breath. Negative for apnea, cough, choking, chest tightness, wheezing and stridor.    Cardiovascular: Positive for leg swelling. Negative for chest pain and palpitations.   Gastrointestinal: Negative for abdominal pain, blood in stool, constipation, diarrhea, nausea and vomiting.   Endocrine: Negative for cold intolerance, heat intolerance, polydipsia, polyphagia and polyuria.   Genitourinary: Negative for dysuria, flank pain and hematuria.   Musculoskeletal: Negative for arthralgias, back pain, myalgias and neck pain.   Skin: Negative for rash and wound.   Allergic/Immunologic: Negative for immunocompromised state.   Neurological: Negative for dizziness, seizures, syncope, speech difficulty, weakness, light-headedness, numbness and headaches.   Hematological: Does not bruise/bleed easily.   Psychiatric/Behavioral: Negative for agitation, behavioral problems, confusion, decreased concentration, hallucinations, self-injury and suicidal ideas. The patient is not nervous/anxious.       Otherwise complete ROS is negative except as mentioned above.    Physical Exam:   Temp:  [96.7 °F (35.9 °C)-98.1 °F (36.7 °C)] 96.7 °F (35.9 °C)  Heart Rate:  [] 109  Resp:  [18-22] 20  BP: ()/(67-78) 100/75     Physical Exam   Constitutional: He is oriented to person, place, and time. He appears well-developed and well-nourished.   HENT:   Head: Normocephalic and atraumatic.   Nose: Nose normal.   Mouth/Throat: Oropharynx is clear and moist.   Eyes: Pupils are equal, round, and reactive to light. Conjunctivae, EOM and lids are normal. No scleral icterus.   Neck: Normal range of motion. Neck supple. No JVD present. No tracheal tenderness and no spinous process tenderness present. No neck rigidity. No tracheal deviation, no edema and normal range of motion present.      Cardiovascular: Regular rhythm, S1 normal, S2 normal and normal pulses. Tachycardia present. Exam reveals no gallop and no friction rub.   Murmur heard.  Pulmonary/Chest: Effort normal. No accessory muscle usage. No respiratory distress. He has decreased breath sounds. He has no wheezes. He has rales (faint). He exhibits no tenderness.   Abdominal: Soft. Bowel sounds are normal. He exhibits no distension and no mass. There is no tenderness. There is no rebound and no guarding.   Musculoskeletal: He exhibits edema. He exhibits no tenderness.   Neurological: He is alert and oriented to person, place, and time. He has normal reflexes. He displays no atrophy and normal reflexes. No cranial nerve deficit or sensory deficit. He exhibits normal muscle tone. He displays no seizure activity. Coordination normal.   Skin: Skin is warm. No rash noted. No pallor.   Psychiatric: He has a normal mood and affect. His behavior is normal. Judgment and thought content normal.         Results Reviewed:  I have personally reviewed current lab, radiology, and data and agree with results.  Lab Results (last 24 hours)     Procedure Component Value Units Date/Time    [place] Draw [[id number]] Collected:  02/05/20 0648    Specimen:  Blood Updated:  02/05/20 0801    Narrative:       The following orders were created for panel order [place] Draw.  Procedure                               Abnormality         Status                     ---------                               -----------         ------                     Light Blue Top[[id number]]                                   Final result               Green Top (Gel)[[id number]]                                  Final result               Lavender Top[[id number]]                                     Final result               Gold Top - SST[[id number]]                                   Final result                 Please view results for these tests on the individual orders.    Light Blue Top  [[id number]] Collected:  02/05/20 0648    Specimen:  Blood Updated:  02/05/20 0801     Extra Tube hold for add-on     Comment: Auto resulted       Green Top (Gel) [[id number]] Collected:  02/05/20 0648    Specimen:  Blood Updated:  02/05/20 0801     Extra Tube Hold for add-ons.     Comment: Auto resulted.       Lavender Top [[id number]] Collected:  02/05/20 0649    Specimen:  Blood Updated:  02/05/20 0801     Extra Tube hold for add-on     Comment: Auto resulted       Gold Top - SST [[id number]] Collected:  02/05/20 0648    Specimen:  Blood Updated:  02/05/20 0801     Extra Tube Hold for add-ons.     Comment: Auto resulted.       Protime-INR [[id number]]  (Abnormal) Collected:  02/05/20 0648    Specimen:  Blood Updated:  02/05/20 0756     Protime 19.6 Seconds      INR 1.68    Narrative:       Therapeutic range for most indications is 2.0-3.0 INR,  or 2.5-3.5 for mechanical heart valves.    Comprehensive Metabolic Panel [[id number]]  (Abnormal) Collected:  02/05/20 0648    Specimen:  Blood Updated:  02/05/20 0715     Glucose 106 mg/dL      BUN 18 mg/dL      Creatinine 1.02 mg/dL      Sodium 134 mmol/L      Potassium 4.3 mmol/L      Chloride 98 mmol/L      CO2 24.0 mmol/L      Calcium 9.0 mg/dL      Total Protein 5.8 g/dL      Albumin 3.60 g/dL      ALT (SGPT) 135 U/L      AST (SGOT) 94 U/L      Comment: Specimen hemolyzed.  Results may be affected.        Alkaline Phosphatase 51 U/L      Total Bilirubin 0.7 mg/dL      eGFR Non African Amer 82 mL/min/1.73      Globulin 2.2 gm/dL      A/G Ratio 1.6 g/dL      BUN/Creatinine Ratio 17.6     Anion Gap 12.0 mmol/L     Narrative:       GFR Normal >60  Chronic Kidney Disease <60  Kidney Failure <15      Troponin [[id number]]  (Normal) Collected:  02/05/20 0648    Specimen:  Blood Updated:  02/05/20 0712     Troponin T <0.010 ng/mL     Narrative:       Troponin T Reference Range:  <= 0.03 ng/mL-   Negative for AMI  >0.03 ng/mL-     Abnormal for myocardial necrosis.  Clinicians would  have to utilize clinical acumen, EKG, Troponin and serial changes to determine if it is an Acute Myocardial Infarction or myocardial injury due to an underlying chronic condition.       Results may be falsely decreased if patient taking Biotin.      BNP [[id number]]  (Abnormal) Collected:  02/05/20 0648    Specimen:  Blood Updated:  02/05/20 0710     proBNP 3,219.0 pg/mL     Narrative:       Among patients with dyspnea, NT-proBNP is highly sensitive for the detection of acute congestive heart failure. In addition NT-proBNP of <300 pg/ml effectively rules out acute congestive heart failure with 99% negative predictive value.    Results may be falsely decreased if patient taking Biotin.      CBC & Differential [[id number]] Collected:  02/05/20 0649    Specimen:  Blood Updated:  02/05/20 0653    Narrative:       The following orders were created for panel order CBC & Differential.  Procedure                               Abnormality         Status                     ---------                               -----------         ------                     CBC Auto Differential[[id number]]        Abnormal            Final result                 Please view results for these tests on the individual orders.    CBC Auto Differential [[id number]]  (Abnormal) Collected:  02/05/20 0649    Specimen:  Blood Updated:  02/05/20 0653     WBC 8.02 10*3/mm3      RBC 4.46 10*6/mm3      Hemoglobin 12.8 g/dL      Hematocrit 39.0 %      MCV 87.4 fL      MCH 28.7 pg      MCHC 32.8 g/dL      RDW 13.9 %      RDW-SD 44.7 fl      MPV 10.6 fL      Platelets 226 10*3/mm3      Neutrophil % 58.2 %      Lymphocyte % 30.9 %      Monocyte % 8.7 %      Eosinophil % 1.5 %      Basophil % 0.5 %      Immature Grans % 0.2 %      Neutrophils, Absolute 4.66 10*3/mm3      Lymphocytes, Absolute 2.48 10*3/mm3      Monocytes, Absolute 0.70 10*3/mm3      Eosinophils, Absolute 0.12 10*3/mm3      Basophils, Absolute 0.04 10*3/mm3      Immature Grans, Absolute 0.02  10*3/mm3      nRBC 0.0 /100 WBC         Imaging Results (Last 24 Hours)     Procedure Component Value Units Date/Time    US Venous Doppler Lower Extremity Bilateral (duplex) [[id number]] Collected:  02/05/20 0853     Updated:  02/05/20 0941    Narrative:         [facility] [place]    Patient Name: SANYA BULLARD    ATTENDING: CARLOS BOWSER     REFERRING: ELFEGO LYN    ORDERING: ELFEGO LYN    -----------------------    PROCEDURE: Duplex ultrasound beadiness examination bilateral  lower extremities    DATE OF EXAM: 2/5/2020    HISTORY: Leg edema, left greater than right    Ultrasound grayscale imaging along with color flow Doppler  evaluation of the major veins in both lower extremities was  performed.     FINDINGS:    Imaging shows a normal appearance with good compressibility and  normal wave augmentation within the common femoral veins, femoral  veins, and the popliteal veins bilaterally. Imaging below both  knees is unremarkable. Normal venous flow is seen with Doppler  imaging and there are no filling defects or internal echoes to  suggest deep venous thrombosis.    There is patency with satisfactory flow demonstrated within the  greater saphenous veins bilaterally.       Impression:       Unremarkable duplex venous Doppler examination of  bilateral lower extremities. No evidence of deep venous  thrombosis.        Electronically signed by:  Damien Bravo MD  2/5/2020 9:40 AM CST  Workstation: [id number]    XR Chest 2 View [[id number]] Collected:  02/05/20 0707     Updated:  02/05/20 0755    Narrative:           PROCEDURE: Chest PA and lateral    REASON FOR EXAM: [facility] Triage Protocol    FINDINGS: Comparison study dated February 4, 2020. Patient has  external vest chest monitoring system in place. Cardiac and  pulmonary vasculature are normal . Lungs are clear. Pleural  spaces are normal . No acute osseous abnormality.      Impression:       1.  No acute cardiopulmonary  abnormality.    Electronically signed by:  Bijan Kraus MD  2/5/2020 7:53 AM CST  Workstation: [id number]            Assessment:    Active Hospital Problems    Diagnosis   • Acute on chronic HFrEF (heart failure with reduced ejection fraction) (CMS/[facility])             Plan:  1.  Acute exacerbation of chronic systolic congestive heart failure.  Patient with an ejection fraction of approximately 5-10% per discharge summary from [facility] on December 23.  Currently has a LifeVest on.  His primary cardiologist currently is in [place], but he states that he wants to establish with Dr. Perez.  I will begin diuresis tonight.  I discussed with Dr. Perez and he will see the patient in the morning.  He may require inotropic support in the short-term.  Etiology of heart failure is nonischemic from what I understand.  Repeat echocardiography is pending.    2.  ADHD: Patient is on Vyvanse.  3.  Tobacco abuse: Patient smokes only a few cigarettes a day per his report.  Counseled on cessation.  4.  Left ventricular thrombus: On Coumadin.  INR subtherapeutic.  Will ask pharmacy to help with dosing was in the hospital.    I discussed the patient's findings and my recommendations with: Patient and his mother.        This document has been electronically signed by David Waterman MD on February 5, 2020 6:21 PM

## 2020-02-06 NOTE — PLAN OF CARE
Problem: Patient Care Overview  Goal: Plan of Care Review  Outcome: Ongoing (interventions implemented as appropriate)  Flowsheets (Taken 2/6/2020 3488)  Progress: no change  Outcome Summary: initial visit  Note:   Mild Sodium Restricted Diet info used to provide Low Sodium Diet ed and diet copy given.

## 2020-02-06 NOTE — PLAN OF CARE
Pt currently wearing life vest, states he received it while in the care of Dr. Girard with [facility]. Will continue to monitor patient, strict input and output as the patient is on Lasix. Pt goals are ongoing.

## 2020-02-06 NOTE — PROGRESS NOTES
[facility] Medicine Services  INPATIENT PROGRESS NOTE    Length of Stay: 0  Date of Admission: 2/5/2020  Primary Care Physician: Provider, No Known    Subjective   Chief Complaint: No new complaints.    HPI: Patient is seen for follow-up today.  Is a 37-year-old male with history of nonischemic cardiomyopathy (ejection fraction of about 10%), heart failure with decreased ejection fraction, anxiety and GERD who was admitted for acute on chronic systolic heart failure.  He is doing well, less symptomatic but remains on supplemental oxygen of 1 L nasal prongs and maintaining saturation in the upper 90s.  He voices no new complaints.    Review of Systems   Constitutional: Positive for fatigue. Negative for activity change, appetite change, chills, diaphoresis and fever.   HENT: Negative for trouble swallowing and voice change.    Eyes: Negative for photophobia and visual disturbance.   Respiratory: Negative for cough, choking, chest tightness, shortness of breath, wheezing and stridor.    Cardiovascular: Negative for chest pain, palpitations and leg swelling.   Gastrointestinal: Negative for abdominal distention, abdominal pain, blood in stool, constipation, diarrhea, nausea and vomiting.   Endocrine: Negative for cold intolerance, heat intolerance, polydipsia, polyphagia and polyuria.   Genitourinary: Negative for decreased urine volume, difficulty urinating, dysuria, enuresis, flank pain, frequency, hematuria and urgency.   Musculoskeletal: Negative for arthralgias, gait problem, myalgias, neck pain and neck stiffness.   Skin: Negative for pallor, rash and wound.   Neurological: Negative for dizziness, tremors, seizures, syncope, facial asymmetry, speech difficulty, weakness, light-headedness, numbness and headaches.   Hematological: Does not bruise/bleed easily.   Psychiatric/Behavioral: Negative for agitation, behavioral problems and confusion.       Objective    Temp:   [96.7 °F (35.9 °C)-97.7 °F (36.5 °C)] 97.2 °F (36.2 °C)  Heart Rate:  [] 96  Resp:  [20-22] 20  BP: (100-116)/(66-78) 105/77    Physical Exam   Constitutional: He is oriented to person, place, and time. He appears well-developed and well-nourished. No distress.   He wears a LifeVest.   HENT:   Head: Normocephalic and atraumatic.   Eyes: Pupils are equal, round, and reactive to light. EOM are normal. No scleral icterus.   Neck: Normal range of motion. Neck supple. No JVD present. No thyromegaly present.   Cardiovascular: Normal rate, regular rhythm and normal heart sounds. Exam reveals no gallop and no friction rub.   No murmur heard.  Pulmonary/Chest: Effort normal and breath sounds normal. He has no wheezes. He has no rales. He exhibits no tenderness.   Abdominal: Soft. Bowel sounds are normal. He exhibits no distension and no mass. There is no tenderness. There is no rebound and no guarding.   Musculoskeletal: He exhibits no edema, tenderness or deformity.   Neurological: He is alert and oriented to person, place, and time. No cranial nerve deficit or sensory deficit. He exhibits normal muscle tone. Coordination normal.   Skin: Skin is warm and dry. No rash noted. He is not diaphoretic. No erythema. No pallor.   Psychiatric: He has a normal mood and affect. His behavior is normal. Judgment and thought content normal.   Nursing note and vitals reviewed.        Medication Review:    Current Facility-Administered Medications:   •  albuterol (PROVENTIL) nebulizer solution 0.083% 2.5 mg/3mL, 2.5 mg, Nebulization, Q6H PRN, David Waterman MD  •  calcium carbonate (TUMS) chewable tablet 500 mg (200 mg elemental), 2 tablet, Oral, Daily, David Waterman MD, 2 tablet at 02/06/20 1026  •  furosemide (LASIX) injection 40 mg, 40 mg, Intravenous, Q12H, David Waterman MD, 40 mg at 02/06/20 1026  •  Magnesium Oxide tablet 400 mg, 400 mg, Oral, Daily, David Waterman MD, 400 mg at 02/06/20 1026  •  metoprolol  succinate XL (TOPROL-XL) 24 hr tablet 25 mg, 25 mg, Oral, Nightly, David Waterman MD, 25 mg at 02/05/20 2128  •  pantoprazole (PROTONIX) EC tablet 40 mg, 40 mg, Oral, QAM, David Waterman MD, 40 mg at 02/06/20 1026  •  Pharmacy to dose warfarin, , Does not apply, Continuous PRN, David Waterman MD  •  sodium chloride 0.9 % flush 10 mL, 10 mL, Intravenous, PRN, David Waterman MD  •  sodium chloride 0.9 % flush 10 mL, 10 mL, Intravenous, Q12H, David Waterman MD, 10 mL at 02/06/20 1027  •  sodium chloride 0.9 % flush 10 mL, 10 mL, Intravenous, PRN, David Waterman MD  •  tiZANidine (ZANAFLEX) tablet 4 mg, 4 mg, Oral, Nightly PRN, David Waterman MD  •  warfarin (COUMADIN) tablet 7.5 mg, 7.5 mg, Oral, Nightly, David Waterman MD, 7.5 mg at 02/05/20 2128  •  zolpidem (AMBIEN) tablet 10 mg, 10 mg, Oral, Nightly PRN, David Waterman MD    Results Review:  I have reviewed the labs, radiology results, and diagnostic studies.    Laboratory Data:   Results from last 7 days   Lab Units 02/05/20  0648 02/04/20  0110   SODIUM mmol/L 134* 130*   POTASSIUM mmol/L 4.3 4.0   CHLORIDE mmol/L 98 95*   CO2 mmol/L 24.0 23.0   BUN mg/dL 18 14   CREATININE mg/dL 1.02 0.97   GLUCOSE mg/dL 106* 117*   CALCIUM mg/dL 9.0 8.8   BILIRUBIN mg/dL 0.7 1.1   ALK PHOS U/L 51 57   ALT (SGPT) U/L 135* 148*   AST (SGOT) U/L 94* 134*   ANION GAP mmol/L 12.0 12.0     Estimated Creatinine Clearance: 118.8 mL/min (by C-G formula based on SCr of 1.02 mg/dL).          Results from last 7 days   Lab Units 02/05/20  0649 02/04/20  0110   WBC 10*3/mm3 8.02 6.94   HEMOGLOBIN g/dL 12.8* 12.5*   HEMATOCRIT % 39.0 38.6   PLATELETS 10*3/mm3 226 235     Results from last 7 days   Lab Units 02/06/20  0507 02/05/20  0648   INR  1.55* 1.68*       Culture Data:   No results found for: BLOODCX  No results found for: URINECX  No results found for: RESPCX  No results found for: WOUNDCX  No results found for: STOOLCX  No components found for:  BODYFLD    Radiology Data:   Imaging Results (Last 24 Hours)     ** No results found for the last 24 hours. **          I have reviewed the patient's current medications.     Assessment/Plan     Hospital Problem List:  Active Problems:    Acute on chronic HFrEF (heart failure with reduced ejection fraction): Improving.  Continue diuretic therapy, beta-blocker with strict I's and O's, daily weights, salt and fluid restriction.  Patient will also likely benefit from Aldactone, ACE inhibitor/ARB, Entresto or digoxin.  Repeat echocardiogram is pending and cardiologist has been consulted.    History of left ventricular thrombus: Continue anticoagulation with Coumadin.  INR is 1.55.  Continue to monitor.    History of ADHD: Continue Vyvanse.     Continue PPI for GERD/DVT prophylaxis with warfarin.        Discharge Planning: In progress.    Ricardo Grubbs MD   02/06/20   10:33 AM

## 2020-02-06 NOTE — CONSULTS
Adult Nutrition  Assessment    Patient Name:  Didier Dahl  YOB: 1980  MRN: [id number]  Admit Date:  2/5/2020    Assessment Date:  2/6/2020    Comments:  Pt with dx Heart FAilure.  Pt reports 6-7 lb wt gain the pat few days.  Lasix prescribed.  Pt voiced food preferences.  Mild Sodium Restricted Diet info used to provide Low Sodium Diet ed and diet copy given.    Reason for Assessment     Row Name 02/06/20 1511          Reason for Assessment    Reason For Assessment  per organizational policy Low Sodium Diet ed     Diagnosis  cardiac disease dx Heart Failure     Identified At Risk by Screening Criteria  need for education                             Electronically signed by:  Jess Gonzalez RD  02/06/20 3:12 PM

## 2020-02-07 ENCOUNTER — DOCUMENTATION (OUTPATIENT)
Dept: CARDIOLOGY | Facility: CLINIC | Age: 40
End: 2020-02-07

## 2020-02-07 LAB
HOLD SPECIMEN: NORMAL
INR PPP: 1.82 (ref 0.8–1.2)
PROTHROMBIN TIME: 20.8 SECONDS (ref 11.1–15.3)
WHOLE BLOOD HOLD SPECIMEN: NORMAL

## 2020-02-07 PROCEDURE — 85610 PROTHROMBIN TIME: CPT | Performed by: HOSPITALIST

## 2020-02-07 PROCEDURE — 25010000002 FUROSEMIDE PER 20 MG: Performed by: HOSPITALIST

## 2020-02-07 RX ORDER — LANOLIN ALCOHOL/MO/W.PET/CERES
400 CREAM (GRAM) TOPICAL 2 TIMES DAILY
Status: DISCONTINUED | OUTPATIENT
Start: 2020-02-07 | End: 2020-02-08 | Stop reason: HOSPADM

## 2020-02-07 RX ORDER — DIGOXIN 125 MCG
125 TABLET ORAL
Status: DISCONTINUED | OUTPATIENT
Start: 2020-02-07 | End: 2020-02-08

## 2020-02-07 RX ADMIN — FUROSEMIDE 40 MG: 10 INJECTION, SOLUTION INTRAMUSCULAR; INTRAVENOUS at 08:33

## 2020-02-07 RX ADMIN — SODIUM CHLORIDE, PRESERVATIVE FREE 10 ML: 5 INJECTION INTRAVENOUS at 08:35

## 2020-02-07 RX ADMIN — PANTOPRAZOLE SODIUM 40 MG: 40 TABLET, DELAYED RELEASE ORAL at 06:19

## 2020-02-07 RX ADMIN — Medication 12.5 MG: at 08:34

## 2020-02-07 RX ADMIN — FUROSEMIDE 40 MG: 10 INJECTION, SOLUTION INTRAMUSCULAR; INTRAVENOUS at 22:02

## 2020-02-07 RX ADMIN — DIGOXIN 125 MCG: 125 TABLET ORAL at 12:10

## 2020-02-07 RX ADMIN — WARFARIN SODIUM 10 MG: 10 TABLET ORAL at 22:02

## 2020-02-07 RX ADMIN — METOPROLOL TARTRATE 12.5 MG: 25 TABLET, FILM COATED ORAL at 22:01

## 2020-02-07 RX ADMIN — Medication 400 MG: at 08:34

## 2020-02-07 RX ADMIN — SACUBITRIL AND VALSARTAN 1 TABLET: 24; 26 TABLET, FILM COATED ORAL at 22:23

## 2020-02-07 RX ADMIN — CALCIUM CARBONATE (ANTACID) CHEW TAB 500 MG 2 TABLET: 500 CHEW TAB at 08:34

## 2020-02-07 RX ADMIN — SACUBITRIL AND VALSARTAN 1 TABLET: 24; 26 TABLET, FILM COATED ORAL at 04:21

## 2020-02-07 RX ADMIN — ZOLPIDEM TARTRATE 10 MG: 5 TABLET ORAL at 22:23

## 2020-02-07 RX ADMIN — Medication 400 MG: at 22:01

## 2020-02-07 NOTE — PLAN OF CARE
Pt rested well through the night, VSS, will continue to monitor.  Problem: Patient Care Overview  Goal: Plan of Care Review  Outcome: Ongoing (interventions implemented as appropriate)  Flowsheets  Taken 2/7/2020 0346  Progress: no change  Taken 2/6/2020 2049  Plan of Care Reviewed With: patient

## 2020-02-07 NOTE — PAYOR COMM NOTE
"  Patient was in observation status 2/5/20 and changed to inpatient today, 2/7/20.    Barbara Dorsey RN Case Management  Phone [phone] Fax [phone]    Sanya Dahl (40 y.o. Male)     Date of Birth Social Security Number Address Home Phone MRN    1980  [address]   [facility] [id number] [phone] [id number]    Mormonism Marital Status          Restorationist Single       Admission Date Admission Type Admitting Provider Attending Provider Department, Room/Bed    2/5/20 Emergency David Waterman MD Williams, Kevin L, MD [address], 304/1    Discharge Date Discharge Disposition Discharge Destination                       Attending Provider:  David Waterman MD    Allergies:  Chantix [Varenicline]    Isolation:  None   Infection:  None   Code Status:  CPR    Ht:  180.3 cm (71\")   Wt:  85.7 kg (189 lb)    Admission Cmt:  None   Principal Problem:  None                Active Insurance as of 2/5/2020     Primary Coverage     Payor Plan Insurance Group Employer/Plan Group    [employer] [place] [employer] [place]ICAID      Payor Plan Address Payor Plan Phone Number Payor Plan Fax Number Effective Dates    [address] [address] [phone]  10/28/2017 - None Entered    [place] [address]       Subscriber Name Subscriber Birth Date Member ID       SANYA DAHL 1980 [id number]                 Emergency Contacts      (Rel.) Home Phone Work Phone Mobile Phone    doug holland (Father) [phone] -- [phone]            Insurance Information                [facility] MEDICAID Phone: [phone]    Subscriber: Sanya Dahl Subscriber#: [id number]    Group#:  Precert#:              History & Physical      David Waterman MD at 02/05/20 1821                [facility] Medicine Admission      Date of Admission: 2/5/2020      Primary Care Physician: Provider, No Known      Chief Complaint: " Shortness of breath and leg pain    HPI: Patient is a 40-year-old  male with past medical history of severe congestive heart failure requiring a LifeVest.  He presented emergency department with complaints of worsening lower extremity edema and pain.  He has been increasingly short of air reports that he is unable to lie flat.  He states that he thinks he has obstructive sleep apnea because he wakes up suddenly frequently in the night not being able to breathe.  He has been undergoing cardiac rehab, but was unable to continue with that today due to his worsening respiratory status.    Concurrent Medical History:  has a past medical history of Anxiety and GERD (gastroesophageal reflux disease).  Congestive heart failure with an ejection fraction reported to be 10%.    Past Surgical History:  has a past surgical history that includes Vascular surgery.    Family History: Congestive heart failure    Social History:  reports that he has been smoking. He has been smoking about 0.50 packs per day. He has never used smokeless tobacco. He reports that he drinks alcohol. He reports that he does not use drugs.    Allergies:   Allergies   Allergen Reactions   • Chantix [Varenicline]        Medications:   Prior to Admission medications    Medication Sig Start Date End Date Taking? Authorizing Provider   Calcium Carbonate Antacid 1250 MG/5ML Take  by mouth.   Yes Lena Cabrera MD   furosemide (LASIX) 20 MG tablet Take 20 mg by mouth 2 (Two) Times a Day.   Yes Lena Cabrera MD   lisdexamfetamine (VYVANSE) 70 MG capsule Take 70 mg by mouth Every Morning Pt takes 1/2 in the morning and 1/2 in the afternoon    Yes Lena Cabrera MD   magnesium oxide (MAGOX) 400 (241.3 Mg) MG tablet tablet Take 400 mg by mouth Daily.   Yes Lena Cabrera MD   metoprolol succinate XL (TOPROL-XL) 25 MG 24 hr tablet Take 25 mg by mouth Every Night.   Yes Lena Cabrera MD   omeprazole (priLOSEC) 20 MG capsule  Take 20 mg by mouth Daily.   Yes Lena Cabrera MD   tiZANidine (ZANAFLEX) 4 MG tablet Take 4 mg by mouth At Night As Needed for Muscle Spasms.   Yes Lena Cabrera MD   vitamin D (ERGOCALCIFEROL) 1.25 MG (28102 UT) capsule capsule Take 50,000 Units by mouth Every 7 (Seven) Days.   Yes Lena Cabrera MD   warfarin (COUMADIN) 7.5 MG tablet Take 7.5 mg by mouth Every Night.   Yes Lena Cabrera MD   zolpidem (AMBIEN) 10 MG tablet Take 10 mg by mouth At Night As Needed for Sleep.   Yes Lena Cabrera MD   albuterol sulfate  (90 Base) MCG/ACT inhaler Inhale 2 puffs Every 4 (Four) Hours As Needed for Wheezing. 12/4/19   Naun Herbert MD   HYDROcodone-acetaminophen (NORCO) 7.5-325 MG per tablet Take 1 tablet by mouth Every 6 (Six) Hours As Needed for Moderate Pain .  2/5/20  Lena Cabrera MD   levoFLOXacin (LEVAQUIN) 500 MG tablet Take 1.5 tablets by mouth Daily. 12/4/19 2/5/20  Naun Herbert MD   ondansetron ODT (ZOFRAN-ODT) 4 MG disintegrating tablet Take 1 tablet by mouth 4 (Four) Times a Day. 10/9/18 2/5/20  Cam James MD   oxyCODONE-acetaminophen (PERCOCET) 5-325 MG per tablet Take 1 tablet by mouth Every 6 (Six) Hours As Needed for Severe Pain . 10/9/18 2/5/20  Cam James MD   predniSONE (DELTASONE) 20 MG tablet Take 1 tablet by mouth 2 (Two) Times a Day. 12/4/19 2/5/20  Naun Herbert MD   propranolol (INDERAL) 60 MG tablet Take 60 mg by mouth 3 (Three) Times a Day.  2/5/20  Lena Cabrera MD       Review of Systems:  Review of Systems   Constitutional: Positive for activity change, appetite change and fatigue. Negative for chills, fever and unexpected weight change.   HENT: Negative for congestion, facial swelling, hearing loss, nosebleeds, rhinorrhea, sneezing, trouble swallowing and voice change.    Eyes: Negative for photophobia and visual disturbance.   Respiratory: Positive for shortness of breath. Negative for apnea,  cough, choking, chest tightness, wheezing and stridor.    Cardiovascular: Positive for leg swelling. Negative for chest pain and palpitations.   Gastrointestinal: Negative for abdominal pain, blood in stool, constipation, diarrhea, nausea and vomiting.   Endocrine: Negative for cold intolerance, heat intolerance, polydipsia, polyphagia and polyuria.   Genitourinary: Negative for dysuria, flank pain and hematuria.   Musculoskeletal: Negative for arthralgias, back pain, myalgias and neck pain.   Skin: Negative for rash and wound.   Allergic/Immunologic: Negative for immunocompromised state.   Neurological: Negative for dizziness, seizures, syncope, speech difficulty, weakness, light-headedness, numbness and headaches.   Hematological: Does not bruise/bleed easily.   Psychiatric/Behavioral: Negative for agitation, behavioral problems, confusion, decreased concentration, hallucinations, self-injury and suicidal ideas. The patient is not nervous/anxious.       Otherwise complete ROS is negative except as mentioned above.    Physical Exam:   Temp:  [96.7 °F (35.9 °C)-98.1 °F (36.7 °C)] 96.7 °F (35.9 °C)  Heart Rate:  [] 109  Resp:  [18-22] 20  BP: ()/(67-78) 100/75     Physical Exam   Constitutional: He is oriented to person, place, and time. He appears well-developed and well-nourished.   HENT:   Head: Normocephalic and atraumatic.   Nose: Nose normal.   Mouth/Throat: Oropharynx is clear and moist.   Eyes: Pupils are equal, round, and reactive to light. Conjunctivae, EOM and lids are normal. No scleral icterus.   Neck: Normal range of motion. Neck supple. No JVD present. No tracheal tenderness and no spinous process tenderness present. No neck rigidity. No tracheal deviation, no edema and normal range of motion present.   Cardiovascular: Regular rhythm, S1 normal, S2 normal and normal pulses. Tachycardia present. Exam reveals no gallop and no friction rub.   Murmur heard.  Pulmonary/Chest: Effort normal. No  accessory muscle usage. No respiratory distress. He has decreased breath sounds. He has no wheezes. He has rales (faint). He exhibits no tenderness.   Abdominal: Soft. Bowel sounds are normal. He exhibits no distension and no mass. There is no tenderness. There is no rebound and no guarding.   Musculoskeletal: He exhibits edema. He exhibits no tenderness.   Neurological: He is alert and oriented to person, place, and time. He has normal reflexes. He displays no atrophy and normal reflexes. No cranial nerve deficit or sensory deficit. He exhibits normal muscle tone. He displays no seizure activity. Coordination normal.   Skin: Skin is warm. No rash noted. No pallor.   Psychiatric: He has a normal mood and affect. His behavior is normal. Judgment and thought content normal.         Results Reviewed:  I have personally reviewed current lab, radiology, and data and agree with results.  Lab Results (last 24 hours)     Procedure Component Value Units Date/Time    [facility] [[id number]] Collected:  02/05/20 0648    Specimen:  Blood Updated:  02/05/20 0801    Narrative:       The following orders were created for panel order [facility].  Procedure                               Abnormality         Status                     ---------                               -----------         ------                     Light Blue Top[[id number]]                                   Final result               Green Top (Gel)[[id number]]                                  Final result               Lavender Top[[id number]]                                     Final result               Gold Top - SST[[id number]]                                   Final result                 Please view results for these tests on the individual orders.    Light Blue Top [[id number]] Collected:  02/05/20 0648    Specimen:  Blood Updated:  02/05/20 0801     Extra Tube hold for add-on     Comment: Auto resulted       Green Top (Gel) [[id number]] Collected:   02/05/20 0648    Specimen:  Blood Updated:  02/05/20 0801     Extra Tube Hold for add-ons.     Comment: Auto resulted.       Lavender Top [[id number]] Collected:  02/05/20 0649    Specimen:  Blood Updated:  02/05/20 0801     Extra Tube hold for add-on     Comment: Auto resulted       Gold Top - SST [[id number]] Collected:  02/05/20 0648    Specimen:  Blood Updated:  02/05/20 0801     Extra Tube Hold for add-ons.     Comment: Auto resulted.       Protime-INR [[id number]]  (Abnormal) Collected:  02/05/20 0648    Specimen:  Blood Updated:  02/05/20 0756     Protime 19.6 Seconds      INR 1.68    Narrative:       Therapeutic range for most indications is 2.0-3.0 INR,  or 2.5-3.5 for mechanical heart valves.    Comprehensive Metabolic Panel [[id number]]  (Abnormal) Collected:  02/05/20 0648    Specimen:  Blood Updated:  02/05/20 0715     Glucose 106 mg/dL      BUN 18 mg/dL      Creatinine 1.02 mg/dL      Sodium 134 mmol/L      Potassium 4.3 mmol/L      Chloride 98 mmol/L      CO2 24.0 mmol/L      Calcium 9.0 mg/dL      Total Protein 5.8 g/dL      Albumin 3.60 g/dL      ALT (SGPT) 135 U/L      AST (SGOT) 94 U/L      Comment: Specimen hemolyzed.  Results may be affected.        Alkaline Phosphatase 51 U/L      Total Bilirubin 0.7 mg/dL      eGFR Non African Amer 82 mL/min/1.73      Globulin 2.2 gm/dL      A/G Ratio 1.6 g/dL      BUN/Creatinine Ratio 17.6     Anion Gap 12.0 mmol/L     Narrative:       GFR Normal >60  Chronic Kidney Disease <60  Kidney Failure <15      Troponin [[id number]]  (Normal) Collected:  02/05/20 0648    Specimen:  Blood Updated:  02/05/20 0712     Troponin T <0.010 ng/mL     Narrative:       Troponin T Reference Range:  <= 0.03 ng/mL-   Negative for AMI  >0.03 ng/mL-     Abnormal for myocardial necrosis.  Clinicians would have to utilize clinical acumen, EKG, Troponin and serial changes to determine if it is an Acute Myocardial Infarction or myocardial injury due to an underlying chronic condition.              Results may be falsely decreased if patient taking Biotin.      BNP [[id number]]  (Abnormal) Collected:  02/05/20 0648    Specimen:  Blood Updated:  02/05/20 0710     proBNP 3,219.0 pg/mL     Narrative:       Among patients with dyspnea, NT-proBNP is highly sensitive for the detection of acute congestive heart failure. In addition NT-proBNP of <300 pg/ml effectively rules out acute congestive heart failure with 99% negative predictive value.    Results may be falsely decreased if patient taking Biotin.      CBC & Differential [[id number]] Collected:  02/05/20 0649    Specimen:  Blood Updated:  02/05/20 0653    Narrative:       The following orders were created for panel order CBC & Differential.  Procedure                               Abnormality         Status                     ---------                               -----------         ------                     CBC Auto Differential[[id number]]        Abnormal            Final result                 Please view results for these tests on the individual orders.    CBC Auto Differential [[id number]]  (Abnormal) Collected:  02/05/20 0649    Specimen:  Blood Updated:  02/05/20 0653     WBC 8.02 10*3/mm3      RBC 4.46 10*6/mm3      Hemoglobin 12.8 g/dL      Hematocrit 39.0 %      MCV 87.4 fL      MCH 28.7 pg      MCHC 32.8 g/dL      RDW 13.9 %      RDW-SD 44.7 fl      MPV 10.6 fL      Platelets 226 10*3/mm3      Neutrophil % 58.2 %      Lymphocyte % 30.9 %      Monocyte % 8.7 %      Eosinophil % 1.5 %      Basophil % 0.5 %      Immature Grans % 0.2 %      Neutrophils, Absolute 4.66 10*3/mm3      Lymphocytes, Absolute 2.48 10*3/mm3      Monocytes, Absolute 0.70 10*3/mm3      Eosinophils, Absolute 0.12 10*3/mm3      Basophils, Absolute 0.04 10*3/mm3      Immature Grans, Absolute 0.02 10*3/mm3      nRBC 0.0 /100 WBC         Imaging Results (Last 24 Hours)     Procedure Component Value Units Date/Time    US Venous Doppler Lower Extremity Bilateral (duplex)  [[id number]] Collected:  02/05/20 0853     Updated:  02/05/20 0941    Narrative:         [facility] [place]    Patient Name: SANYA BULLARD    ATTENDING: CARLOS BOWSER     REFERRING: ELFEGO LYN    ORDERING: ELFEGO LYN    -----------------------    PROCEDURE: Duplex ultrasound beadiness examination bilateral  lower extremities    DATE OF EXAM: 2/5/2020    HISTORY: Leg edema, left greater than right    Ultrasound grayscale imaging along with color flow Doppler  evaluation of the major veins in both lower extremities was  performed.     FINDINGS:    Imaging shows a normal appearance with good compressibility and  normal wave augmentation within the common femoral veins, femoral  veins, and the popliteal veins bilaterally. Imaging below both  knees is unremarkable. Normal venous flow is seen with Doppler  imaging and there are no filling defects or internal echoes to  suggest deep venous thrombosis.    There is patency with satisfactory flow demonstrated within the  greater saphenous veins bilaterally.       Impression:       Unremarkable duplex venous Doppler examination of  bilateral lower extremities. No evidence of deep venous  thrombosis.        Electronically signed by:  Damien Bravo MD  2/5/2020 9:40 AM CST  Workstation: [id number]    XR Chest 2 View [[id number]] Collected:  02/05/20 0707     Updated:  02/05/20 0755    Narrative:           PROCEDURE: Chest PA and lateral    REASON FOR EXAM: [facility]A Triage Protocol    FINDINGS: Comparison study dated February 4, 2020. Patient has  external vest chest monitoring system in place. Cardiac and  pulmonary vasculature are normal . Lungs are clear. Pleural  spaces are normal . No acute osseous abnormality.      Impression:       1.  No acute cardiopulmonary abnormality.    Electronically signed by:  Bijan Kraus MD  2/5/2020 7:53 AM CST  Workstation: [id number]            Assessment:    Active Hospital Problems    Diagnosis   • Acute on chronic HFrEF (heart  failure with reduced ejection fraction) (CMS/[facility])             Plan:  1.  Acute exacerbation of chronic systolic congestive heart failure.  Patient with an ejection fraction of approximately 5-10% per discharge summary from [facility] on December 23.  Currently has a LifeVest on.  His primary cardiologist currently is in [place], but he states that he wants to establish with Dr. Perez.  I will begin diuresis tonight.  I discussed with Dr. Perez and he will see the patient in the morning.  He may require inotropic support in the short-term.  Etiology of heart failure is nonischemic from what I understand.  Repeat echocardiography is pending.    2.  ADHD: Patient is on Vyvanse.  3.  Tobacco abuse: Patient smokes only a few cigarettes a day per his report.  Counseled on cessation.  4.  Left ventricular thrombus: On Coumadin.  INR subtherapeutic.  Will ask pharmacy to help with dosing was in the hospital.    I discussed the patient's findings and my recommendations with: Patient and his mother.        This document has been electronically signed by David Waterman MD on February 5, 2020 6:21 PM                    Electronically signed by David Waterman MD at 02/05/20 6313          Emergency Department Notes      Samy Ruiz RN at 02/05/20 0640        Pt. Presents to ED with shortness of breath and leg pain that started at midnight. Pt. Ambulates to bed. AAOX4. Airway patent. Breathing labored but adequate. No active bleeding.    Electronically signed by Samy Ruiz RN at 02/05/20 0652     Giselle Madrid MD at 02/05/20 8309     Attestation signed by Triston Reeves MD at 02/05/20 4480    I personally saw and examined the patient.  I have reviewed and agree with the resident's findings including all diagnostic interpretations and treatment plan as documented. I was present for key portions of separately performed procedures and the inclusive time noted in any critical care  situation.      Triston Reeves MD 2/5/2020 3:43 PM                  Shay Velásquez presents with his father to the ED with complaints of bilateral leg pain, mild shortness of breath, left breast/axilla pain, and persistent left lower leg edema.  He has his LifeVest in place.  He also states he has had off-and-on nausea overnight.  He was seen here yesterday for leg pain, and instructed to increase his Lasix to try to get the fluid off of his leg which he did.  His blood pressure is marginal at 88/69, and he feels fatigued.  He had some varicose vein stripping done on his leg in 2015 or 2016.  He says his leg is never swelled up like this before.  He is currently on Coumadin for a left ventricular thrombus discovered on echo in December.        History provided by:  Parent and patient   used: No    Shortness of Breath   Severity:  Mild  Onset quality:  Gradual  Duration:  7 hours  Timing:  Constant  Progression:  Unchanged  Chronicity:  Chronic  Relieved by:  Rest  Worsened by:  Movement  Ineffective treatments:  Lying down  Associated symptoms: chest pain    Associated symptoms: no abdominal pain, no ear pain, no headaches and no rash    Risk factors comment:  HFrEF  Leg Pain   Location:  Leg  Time since incident:  7 hours  Injury: no    Leg pain location: entire leg, unable to determine if if radiates from foot up or groin down.  Pain details:     Quality:  Tingling    Radiates to:  Does not radiate    Severity:  Moderate    Onset quality:  Gradual    Duration:  7 hours    Timing:  Constant    Progression:  Unchanged  Chronicity:  Chronic  Dislocation: no    Relieved by:  Nothing  Worsened by:  Nothing  Ineffective treatments:  Rest  Associated symptoms: fatigue    Associated symptoms: no back pain        Review of Systems   Constitutional: Positive for fatigue. Negative for activity change and appetite change.   HENT: Negative for congestion and ear pain.    Eyes: Negative for pain and  "discharge.   Respiratory: Positive for shortness of breath. Negative for chest tightness.    Cardiovascular: Positive for chest pain and leg swelling (left leg edema). Negative for palpitations.   Gastrointestinal: Negative for abdominal distention and abdominal pain.   Endocrine: Negative for cold intolerance and heat intolerance.   Genitourinary: Negative for difficulty urinating and dysuria.   Musculoskeletal: Negative for arthralgias and back pain.        Bilateral leg pain, L>R, with numbness/tingling. Left leg edema \"improved\" since yesterday   Skin: Negative for color change and rash.   Allergic/Immunologic: Negative for environmental allergies and food allergies.   Neurological: Negative for dizziness and headaches.   Hematological: Negative for adenopathy. Does not bruise/bleed easily.   Psychiatric/Behavioral: Negative for agitation and confusion.       Past Medical History:   Diagnosis Date   • Anxiety    • GERD (gastroesophageal reflux disease)        Allergies   Allergen Reactions   • Chantix [Varenicline]        Past Surgical History:   Procedure Laterality Date   • VASCULAR SURGERY         History reviewed. No pertinent family history.    Social History     Socioeconomic History   • Marital status: Single     Spouse name: Not on file   • Number of children: Not on file   • Years of education: Not on file   • Highest education level: Not on file   Tobacco Use   • Smoking status: Current Every Day Smoker     Packs/day: 0.50   • Smokeless tobacco: Never Used   Substance and Sexual Activity   • Alcohol use: Yes   • Drug use: No   • Sexual activity: Defer           Objective   Physical Exam   Constitutional: He is oriented to person, place, and time. He appears well-developed and well-nourished.   HENT:   Head: Normocephalic and atraumatic.   Eyes: Pupils are equal, round, and reactive to light. EOM are normal.   Neck: Neck supple. No tracheal deviation present. No thyromegaly present.   Cardiovascular: " Normal rate, S1 normal, S2 normal, normal heart sounds and intact distal pulses.   Pulses:       Radial pulses are 2+ on the left side.        Dorsalis pedis pulses are 2+ on the right side, and 2+ on the left side.   Pulmonary/Chest: Breath sounds normal. Accessory muscle usage present. He exhibits tenderness (to left breast/axilla).   Abdominal: Soft.   Musculoskeletal: Normal range of motion.        Left lower leg: He exhibits edema (trace pitting).   Neurological: He is alert and oriented to person, place, and time. GCS eye subscore is 4. GCS verbal subscore is 5. GCS motor subscore is 6.   Skin: Skin is warm and dry. Capillary refill takes 2 to 3 seconds.   Psychiatric: He has a normal mood and affect. His speech is normal and behavior is normal. Thought content normal.       Procedures          ED Course  ED Course as of Feb 05 1045   Wed Feb 05, 2020   0704 XR Chest 2 View [PC]   1019 Didier received a chest x-ray, lab work, and a venous duplex of his lower extremities for his unilateral left leg edema.  His blood pressure improved over his stay, as he was hypotensive on arrival with systolic pressures in the upper 80s.  He continues to be short of breath, with mildly labored respirations.  His INR remains subtherapeutic at 1.68.  He will need to be diuresed, and decision was made to admit to the hospitalist for ops.    [ADELAIDA]      ED Course User Index  [ADELAIDA] Giselle Madrid MD  [PC] Triston Reeves MD      Lab Results   Component Value Date    WBC 8.02 02/05/2020    HGB 12.8 (L) 02/05/2020    HCT 39.0 02/05/2020    MCV 87.4 02/05/2020     02/05/2020     Lab Results   Component Value Date    GLUCOSE 106 (H) 02/05/2020    BUN 18 02/05/2020    CREATININE 1.02 02/05/2020    EGFRIFNONA 82 02/05/2020    BCR 17.6 02/05/2020    K 4.3 02/05/2020    CO2 24.0 02/05/2020    CALCIUM 9.0 02/05/2020    ALBUMIN 3.60 02/05/2020    LABIL2 1.6 12/25/2019    AST 94 (H) 02/05/2020     (H) 02/05/2020     Lab  Results   Component Value Date    INR 1.68 (H) 02/05/2020    INR 1.5 01/24/2020    INR 1.1 07/21/2014    PROTIME 19.6 (H) 02/05/2020    PROTIME 13.6 07/21/2014     BNP 3,219                                         MDM  Number of Diagnoses or Management Options  Acute on chronic HFrEF (heart failure with reduced ejection fraction) (CMS/[facility]):      Amount and/or Complexity of Data Reviewed  Clinical lab tests: reviewed  Tests in the radiology section of CPT®:  reviewed  Decide to obtain previous medical records or to obtain history from someone other than the patient: yes    Risk of Complications, Morbidity, and/or Mortality  Presenting problems: low  Diagnostic procedures: minimal  Management options: low  General comments: Didier will need diuresing in a controlled environment.  Given his hypotension on arrival with systolic readings in the upper 80s, he will need to have frequent vital signs and monitoring during his diuresis.  Decision to admit to ops for his diuresing.    Patient Progress  Patient progress: stable      Final diagnoses:   Acute on chronic HFrEF (heart failure with reduced ejection fraction) (CMS/[facility])         This document has been electronically signed by Giselle Madrid MD on February 5, 2020 10:45 AM         Giselle Madrid MD  Resident  02/05/20 1027       Giselle Madrid MD  Resident  02/05/20 1045      Electronically signed by Triston Reeves MD at 02/05/20 1543          Physician Progress Notes (last 48 hours) (Notes from 02/05/20 1118 through 02/07/20 1118)      Ricardo Grubbs MD at 02/07/20 1043              [facility]  INPATIENT PROGRESS NOTE    Length of Stay: 0  Date of Admission: 2/5/2020  Primary Care Physician: Provider, No Known    Subjective   Chief Complaint: No new complaints.    HPI: Patient is seen for follow-up today.  He is a 37-year-old male with history of nonischemic cardiomyopathy (ejection fraction of  about 10%), heart failure with decreased ejection fraction, anxiety and GERD who was admitted for acute on chronic systolic heart failure.  He is doing well, less symptomatic and is off supplemental oxygen and maintaining saturation of 97 100% on room air. He voices no new complaints.    Review of Systems   Constitutional: Positive for fatigue. Negative for activity change, appetite change, chills, diaphoresis and fever.   HENT: Negative for trouble swallowing and voice change.    Eyes: Negative for photophobia and visual disturbance.   Respiratory: Negative for cough, choking, chest tightness, shortness of breath, wheezing and stridor.    Cardiovascular: Negative for chest pain, palpitations and leg swelling.   Gastrointestinal: Negative for abdominal distention, abdominal pain, blood in stool, constipation, diarrhea, nausea and vomiting.   Endocrine: Negative for cold intolerance, heat intolerance, polydipsia, polyphagia and polyuria.   Genitourinary: Negative for decreased urine volume, difficulty urinating, dysuria, enuresis, flank pain, frequency, hematuria and urgency.   Musculoskeletal: Negative for arthralgias, gait problem, myalgias, neck pain and neck stiffness.   Skin: Negative for pallor, rash and wound.   Neurological: Negative for dizziness, tremors, seizures, syncope, facial asymmetry, speech difficulty, weakness, light-headedness, numbness and headaches.   Hematological: Does not bruise/bleed easily.   Psychiatric/Behavioral: Negative for agitation, behavioral problems and confusion.       Objective    Temp:  [97 °F (36.1 °C)-97.9 °F (36.6 °C)] 97.7 °F (36.5 °C)  Heart Rate:  [] 94  Resp:  [18-20] 18  BP: ()/(59-82) 94/59    Physical Exam   Constitutional: He is oriented to person, place, and time. He appears well-developed and well-nourished. No distress.   HENT:   Head: Normocephalic and atraumatic.   Eyes: Pupils are equal, round, and reactive to light. EOM are normal. No scleral  icterus.   Neck: Normal range of motion. Neck supple. No JVD present. No thyromegaly present.   Cardiovascular: Normal rate, regular rhythm and normal heart sounds. Exam reveals no gallop and no friction rub.   No murmur heard.  Pulmonary/Chest: Effort normal and breath sounds normal. He has no wheezes. He has no rales. He exhibits no tenderness.   Abdominal: Soft. Bowel sounds are normal. He exhibits no distension and no mass. There is no tenderness. There is no rebound and no guarding.   Musculoskeletal: He exhibits no edema, tenderness or deformity.   He has trace pedal edema both legs normal.   Neurological: He is alert and oriented to person, place, and time. No cranial nerve deficit or sensory deficit. He exhibits normal muscle tone. Coordination normal.   Skin: Skin is warm and dry. No rash noted. He is not diaphoretic. No erythema. No pallor.   Psychiatric: He has a normal mood and affect. His behavior is normal. Judgment and thought content normal.   Nursing note and vitals reviewed.        Medication Review:    Current Facility-Administered Medications:   •  albuterol (PROVENTIL) nebulizer solution 0.083% 2.5 mg/3mL, 2.5 mg, Nebulization, Q6H PRN, David Waterman MD  •  calcium carbonate (TUMS) chewable tablet 500 mg (200 mg elemental), 2 tablet, Oral, Daily, David Waterman MD, 2 tablet at 02/07/20 0834  •  digoxin (LANOXIN) tablet 125 mcg, 125 mcg, Oral, Daily, Owen Perez MD  •  furosemide (LASIX) injection 40 mg, 40 mg, Intravenous, Q12H, David Waterman MD, 40 mg at 02/07/20 0833  •  Magnesium Oxide tablet 400 mg, 400 mg, Oral, BID, Owen Perez MD, 400 mg at 02/07/20 0834  •  metoprolol tartrate (LOPRESSOR) half tablet 12.5 mg, 12.5 mg, Oral, Q12H, Owen Perez MD  •  pantoprazole (PROTONIX) EC tablet 40 mg, 40 mg, Oral, QAM, David Waterman MD, 40 mg at 02/07/20 0619  •  Pharmacy to dose warfarin, , Does not apply, Continuous PRN, David Waterman MD  •   sacubitril-valsartan (ENTRESTO) 24-26 MG tablet 1 tablet, 1 tablet, Oral, Q12H, Owen Perez MD, 1 tablet at 02/07/20 0421  •  sodium chloride 0.9 % flush 10 mL, 10 mL, Intravenous, PRN, David Waterman MD  •  sodium chloride 0.9 % flush 10 mL, 10 mL, Intravenous, Q12H, David Waterman MD, 10 mL at 02/07/20 0835  •  sodium chloride 0.9 % flush 10 mL, 10 mL, Intravenous, PRN, David Waterman MD  •  spironolactone (ALDACTONE) half tablet 12.5 mg, 12.5 mg, Oral, Daily, Ricardo Grubbs MD, 12.5 mg at 02/07/20 0834  •  tiZANidine (ZANAFLEX) tablet 4 mg, 4 mg, Oral, Nightly PRN, David Waterman MD  •  warfarin (COUMADIN) tablet 10 mg, 10 mg, Oral, Nightly, David Waterman MD, 10 mg at 02/06/20 2049  •  zolpidem (AMBIEN) tablet 10 mg, 10 mg, Oral, Nightly PRN, David Waterman MD    Results Review:  I have reviewed the labs, radiology results, and diagnostic studies.    Laboratory Data:   Results from last 7 days   Lab Units 02/05/20  0648 02/04/20  0110   SODIUM mmol/L 134* 130*   POTASSIUM mmol/L 4.3 4.0   CHLORIDE mmol/L 98 95*   CO2 mmol/L 24.0 23.0   BUN mg/dL 18 14   CREATININE mg/dL 1.02 0.97   GLUCOSE mg/dL 106* 117*   CALCIUM mg/dL 9.0 8.8   BILIRUBIN mg/dL 0.7 1.1   ALK PHOS U/L 51 57   ALT (SGPT) U/L 135* 148*   AST (SGOT) U/L 94* 134*   ANION GAP mmol/L 12.0 12.0     Estimated Creatinine Clearance: 119 mL/min (by C-G formula based on SCr of 1.02 mg/dL).          Results from last 7 days   Lab Units 02/05/20  0649 02/04/20  0110   WBC 10*3/mm3 8.02 6.94   HEMOGLOBIN g/dL 12.8* 12.5*   HEMATOCRIT % 39.0 38.6   PLATELETS 10*3/mm3 226 235     Results from last 7 days   Lab Units 02/07/20  0523 02/06/20  0507 02/05/20  0648   INR  1.82* 1.55* 1.68*       Culture Data:   No results found for: BLOODCX  No results found for: URINECX  No results found for: RESPCX  No results found for: WOUNDCX  No results found for: STOOLCX  No components found for: BODYFLD    Radiology Data:   Imaging Results  (Last 24 Hours)     ** No results found for the last 24 hours. **          I have reviewed the patient's current medications.     Assessment/Plan     Hospital Problem List:  Active Problems:  Nonischemic cardiomyopathy complicated by acute on chronic HFrEF (heart failure with reduced ejection fraction): Improving.  Continue diuretic therapy, beta-blocker with strict I's and O's, daily weights, salt and fluid restriction with digoxin, Entresto and Aldactone.  Cardiac transplantation has been discussed with the patient. Dr. Perez is following.  Echocardiogram done 2/6/2020 showed:  Left Ventricle Left ventricular systolic function is severely decreased. Estimated EF appears to be in the range of less than 20%. There is left ventricular global hypokinesis noted.   The left ventricular cavity is mildly dilated. Left ventricular diastolic function is normal. The presence of a left ventricular thrombus cannot be excluded. Spontaneous contrast consistent with low flow rate is noted in the left ventricle.   Right Ventricle Normal right ventricular cavity size noted. No evidence of a right ventricular thrombus present. No evidence of a right ventricular mass present.   Left Atrium Left atrial cavity size is moderately dilated. No evidence of a left atrial thrombus present. No evidence of a left atrial mass present. There is no spontaneous echo contrast present. Interatrial septal defect present with right to left shunting indicated by color flow Doppler and saline contrast. Saline test results are positive for right to left atrial level shunt.   Right Atrium Normal right atrial size noted. No evidence of a right atrial thrombus present. No evidence of a right atrial mass present.   Aortic Valve The aortic valve is grossly normal in structure. The valve appears trileaflet. No significant aortic valve regurgitation is present.   Mitral Valve The mitral valve is grossly normal in structure. Mild mitral valve regurgitation is  present.   Tricuspid Valve The tricuspid valve is grossly normal. Mild to moderate tricuspid valve regurgitation is present.   Greater Vessels No dilation of the aortic root is present.   Pericardium The pericardium is normal. There is no evidence of pericardial effusion.     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    History of left ventricular thrombus: Continue anticoagulation with Coumadin.  INR is 1.82.  Continue to monitor.    History of nonsustained V. tach: He has been started on magnesium oxide.  Cardiologist is following.    History of ADHD: Continue Vyvanse.     Continue PPI for GERD/DVT prophylaxis with warfarin.        Discharge Planning: In progress.    Ricardo Grubbs MD   02/07/20   10:43 AM          Electronically signed by Ricardo Grubbs MD at 02/07/20 1050     Ricardo Grubbs MD at 02/06/20 1033              [facility]  INPATIENT PROGRESS NOTE    Length of Stay: 0  Date of Admission: 2/5/2020  Primary Care Physician: Provider, No Known    Subjective   Chief Complaint: No new complaints.    HPI: Patient is seen for follow-up today.  Is a 37-year-old male with history of nonischemic cardiomyopathy (ejection fraction of about 10%), heart failure with decreased ejection fraction, anxiety and GERD who was admitted for acute on chronic systolic heart failure.  He is doing well, less symptomatic but remains on supplemental oxygen of 1 L nasal prongs and maintaining saturation in the upper 90s.  He voices no new complaints.    Review of Systems   Constitutional: Positive for fatigue. Negative for activity change, appetite change, chills, diaphoresis and fever.   HENT: Negative for trouble swallowing and voice change.    Eyes: Negative for photophobia and visual disturbance.   Respiratory: Negative for cough, choking, chest tightness, shortness of breath, wheezing and stridor.     Cardiovascular: Negative for chest pain, palpitations and leg swelling.   Gastrointestinal: Negative for abdominal distention, abdominal pain, blood in stool, constipation, diarrhea, nausea and vomiting.   Endocrine: Negative for cold intolerance, heat intolerance, polydipsia, polyphagia and polyuria.   Genitourinary: Negative for decreased urine volume, difficulty urinating, dysuria, enuresis, flank pain, frequency, hematuria and urgency.   Musculoskeletal: Negative for arthralgias, gait problem, myalgias, neck pain and neck stiffness.   Skin: Negative for pallor, rash and wound.   Neurological: Negative for dizziness, tremors, seizures, syncope, facial asymmetry, speech difficulty, weakness, light-headedness, numbness and headaches.   Hematological: Does not bruise/bleed easily.   Psychiatric/Behavioral: Negative for agitation, behavioral problems and confusion.       Objective    Temp:  [96.7 °F (35.9 °C)-97.7 °F (36.5 °C)] 97.2 °F (36.2 °C)  Heart Rate:  [] 96  Resp:  [20-22] 20  BP: (100-116)/(66-78) 105/77    Physical Exam   Constitutional: He is oriented to person, place, and time. He appears well-developed and well-nourished. No distress.   He wears a LifeVest.   HENT:   Head: Normocephalic and atraumatic.   Eyes: Pupils are equal, round, and reactive to light. EOM are normal. No scleral icterus.   Neck: Normal range of motion. Neck supple. No JVD present. No thyromegaly present.   Cardiovascular: Normal rate, regular rhythm and normal heart sounds. Exam reveals no gallop and no friction rub.   No murmur heard.  Pulmonary/Chest: Effort normal and breath sounds normal. He has no wheezes. He has no rales. He exhibits no tenderness.   Abdominal: Soft. Bowel sounds are normal. He exhibits no distension and no mass. There is no tenderness. There is no rebound and no guarding.   Musculoskeletal: He exhibits no edema, tenderness or deformity.   Neurological: He is alert and oriented to person, place, and  time. No cranial nerve deficit or sensory deficit. He exhibits normal muscle tone. Coordination normal.   Skin: Skin is warm and dry. No rash noted. He is not diaphoretic. No erythema. No pallor.   Psychiatric: He has a normal mood and affect. His behavior is normal. Judgment and thought content normal.   Nursing note and vitals reviewed.        Medication Review:    Current Facility-Administered Medications:   •  albuterol (PROVENTIL) nebulizer solution 0.083% 2.5 mg/3mL, 2.5 mg, Nebulization, Q6H PRN, David Waterman MD  •  calcium carbonate (TUMS) chewable tablet 500 mg (200 mg elemental), 2 tablet, Oral, Daily, David Waterman MD, 2 tablet at 02/06/20 1026  •  furosemide (LASIX) injection 40 mg, 40 mg, Intravenous, Q12H, David Waterman MD, 40 mg at 02/06/20 1026  •  Magnesium Oxide tablet 400 mg, 400 mg, Oral, Daily, David Waterman MD, 400 mg at 02/06/20 1026  •  metoprolol succinate XL (TOPROL-XL) 24 hr tablet 25 mg, 25 mg, Oral, Nightly, David Waterman MD, 25 mg at 02/05/20 2128  •  pantoprazole (PROTONIX) EC tablet 40 mg, 40 mg, Oral, QAM, David Waterman MD, 40 mg at 02/06/20 1026  •  Pharmacy to dose warfarin, , Does not apply, Continuous PRN, David Waterman MD  •  sodium chloride 0.9 % flush 10 mL, 10 mL, Intravenous, PRN, David Waterman MD  •  sodium chloride 0.9 % flush 10 mL, 10 mL, Intravenous, Q12H, David Waterman MD, 10 mL at 02/06/20 1027  •  sodium chloride 0.9 % flush 10 mL, 10 mL, Intravenous, PRN, David Waterman MD  •  tiZANidine (ZANAFLEX) tablet 4 mg, 4 mg, Oral, Nightly PRN, David Waterman MD  •  warfarin (COUMADIN) tablet 7.5 mg, 7.5 mg, Oral, Nightly, David Waterman MD, 7.5 mg at 02/05/20 2128  •  zolpidem (AMBIEN) tablet 10 mg, 10 mg, Oral, Nightly PRN, David Waterman MD    Results Review:  I have reviewed the labs, radiology results, and diagnostic studies.    Laboratory Data:   Results from last 7 days   Lab Units 02/05/20  8671  02/04/20  0110   SODIUM mmol/L 134* 130*   POTASSIUM mmol/L 4.3 4.0   CHLORIDE mmol/L 98 95*   CO2 mmol/L 24.0 23.0   BUN mg/dL 18 14   CREATININE mg/dL 1.02 0.97   GLUCOSE mg/dL 106* 117*   CALCIUM mg/dL 9.0 8.8   BILIRUBIN mg/dL 0.7 1.1   ALK PHOS U/L 51 57   ALT (SGPT) U/L 135* 148*   AST (SGOT) U/L 94* 134*   ANION GAP mmol/L 12.0 12.0     Estimated Creatinine Clearance: 118.8 mL/min (by C-G formula based on SCr of 1.02 mg/dL).          Results from last 7 days   Lab Units 02/05/20  0649 02/04/20  0110   WBC 10*3/mm3 8.02 6.94   HEMOGLOBIN g/dL 12.8* 12.5*   HEMATOCRIT % 39.0 38.6   PLATELETS 10*3/mm3 226 235     Results from last 7 days   Lab Units 02/06/20  0507 02/05/20  0648   INR  1.55* 1.68*       Culture Data:   No results found for: BLOODCX  No results found for: URINECX  No results found for: RESPCX  No results found for: WOUNDCX  No results found for: STOOLCX  No components found for: BODYFLD    Radiology Data:   Imaging Results (Last 24 Hours)     ** No results found for the last 24 hours. **          I have reviewed the patient's current medications.     Assessment/Plan     Hospital Problem List:  Active Problems:    Acute on chronic HFrEF (heart failure with reduced ejection fraction): Improving.  Continue diuretic therapy, beta-blocker with strict I's and O's, daily weights, salt and fluid restriction.  Patient will also likely benefit from Aldactone, ACE inhibitor/ARB, Entresto or digoxin.  Repeat echocardiogram is pending and cardiologist has been consulted.    History of left ventricular thrombus: Continue anticoagulation with Coumadin.  INR is 1.55.  Continue to monitor.    History of ADHD: Continue Vyvanse.     Continue PPI for GERD/DVT prophylaxis with warfarin.        Discharge Planning: In progress.    Ricardo Grubbs MD   02/06/20   10:33 AM          Electronically signed by Ricardo Grubbs MD at 02/06/20 1043          Consult Notes (last 48 hours) (Notes from 02/05/20 1118 through  20 1118)      Owen Perez MD at 20 2109      Consult Orders    1. Inpatient Cardiology Consult [[id number]] ordered by David Waterman MD at 20 1820                Cardiology Consultation Note.        Patient Name: Didier Dahl  Age/Sex: 40 y.o. male  : 1980  MRN: [id number]    Date of consultation: 2020  Consulting Physician: Owen Perez MD  Primary care Physician: Provider, No Known  Requesting Physician:   David Waterman MD     Reason for consultation: Nonischemic cardiomyopathy with severe left ventricular systolic dysfunction.    Subjective:       Chief Complaint: Shortness of breath.      History of Present Illness:  Didier Dahl is a 40 y.o. male     Body mass index is 26.3 kg/m². with a past medical history arterial hypertension, hypertensive heart disease, gastroesophageal reflux disease, chronic back pain, history of scoliosis, history of attention deficit hyperactivity disorder, history of tobacco abuse who was initially evaluated on 2019 in the emergency room with symptoms of shortness of breath and was found to have pulmonary edema and pneumonia but had refused hospitalization and had signed AGAINST MEDICAL ADVICE from the emergency room.    Patient subsequently had presented to [facility] in [place] with recurrent symptoms of shortness of breath.  Patient had episodes of hypotension and was subsequently transferred from [facility] in [place] to [facility] in [place].    Patient underwent further evaluation at [facility] with a transthoracic echocardiogram which had revealed severe left ventricular systolic dysfunction with an ejection fraction of 10% with left ventricular thrombus and a coronary angiogram which had not reveal no evidence of any obstructive epicardial coronary artery disease and patient was treated for nonischemic cardiomyopathy and discharge on a LifeVest.    Patient was  scheduled for outpatient follow-up with cardiology.    Patient had increasing episodes of shortness of breath on discharge from [facility] and presented to [facility].    Patient underwent repeat transthoracic echocardiogram which revealed left ventricular systolic dysfunction with an atrial septal defect with right-to-left shunting.  Patient has not been compliant with his salt intake.  Patient had symptoms of shortness of breath which has been increasing in severity.  Patient LifeVest evaluation had revealed evidence of nonsustained ventricular tachycardia.    Patient at the present time has symptoms of shortness of breath.  Patient complains of feeling tired and fatigued.  Patient denies any symptoms of palpitation.  Patient denies any nausea vomiting.  Patient is currently on anticoagulation with Coumadin.    Patient 10 point review of system except for stated in the history of present illness and negative.          Past Medical History:  1.  Shortness of breath.  2.  Nonischemic cardiomyopathy with left ventricular dilatation with an ejection fraction of 10%.  3.  Left ventricular thrombus on anticoagulation with Coumadin.  4.  Coronary angiogram done at [facility] in December 2019 had revealed no evidence of any obstructive epicardial coronary artery disease.  5.  CTA of the chest done at [facility] did not reveal of any evidence of any pulmonary embolism.  6.  Right heart catheterization had revealed elevated pulmonary capillary wedge pressure in December 2019.  7.  LifeVest evaluation had revealed evidence of nonsustained ventricular tachycardia.  8.  History of arterial hypertension.  9.  Moderate tricuspid regurgitation with mild to moderate mitral regurgitation.  10.  Atrial septal defect with right-to-left shunting with positive bubble study.  11.  Chronic back pain.  12.  Attention deficit hyperactivity disorder.  13.  History of asthmatic bronchitis.  14.   Gastroesophageal reflux disease.  15.  Prostate enlargement.  16.  History of scoliosis.  17.  Chronic anticoagulation with Coumadin since December 2019.      Past Surgical History:  1.  Left lung surgical intervention with removal of the nail.  2.  Varicose vein stripping by Dr. Barbosa.          Family History: Family history significant for mother who had congestive heart failure      Social History: Smokes up to half pack per day.  Previous history of drug abuse in the remote past.  Previous history of alcohol abuse.       Cardiac Risk Factors:   1.  Male.  2.  Arterial hypertension.  3.  Tobacco abuse.    Allergies:  Allergies   Allergen Reactions   • Chantix [Varenicline]        Medication:  Medications Prior to Admission   Medication Sig Dispense Refill Last Dose   • Calcium Carbonate Antacid 1250 MG/5ML Take  by mouth.      • furosemide (LASIX) 20 MG tablet Take 20 mg by mouth 2 (Two) Times a Day.      • lisdexamfetamine (VYVANSE) 70 MG capsule Take 70 mg by mouth Every Morning Pt takes 1/2 in the morning and 1/2 in the afternoon    12/3/2019 at Unknown time   • magnesium oxide (MAGOX) 400 (241.3 Mg) MG tablet tablet Take 400 mg by mouth Daily.      • metoprolol succinate XL (TOPROL-XL) 25 MG 24 hr tablet Take 25 mg by mouth Every Night.      • omeprazole (priLOSEC) 20 MG capsule Take 20 mg by mouth Daily.   2/4/2020 at Unknown time   • tiZANidine (ZANAFLEX) 4 MG tablet Take 4 mg by mouth At Night As Needed for Muscle Spasms.   More than a month at Unknown time   • vitamin D (ERGOCALCIFEROL) 1.25 MG (12525 UT) capsule capsule Take 50,000 Units by mouth Every 7 (Seven) Days.      • warfarin (COUMADIN) 7.5 MG tablet Take 7.5 mg by mouth Every Night.      • zolpidem (AMBIEN) 10 MG tablet Take 10 mg by mouth At Night As Needed for Sleep.   12/3/2019 at Unknown time   • albuterol sulfate  (90 Base) MCG/ACT inhaler Inhale 2 puffs Every 4 (Four) Hours As Needed for Wheezing. 1 inhaler 0 Unknown at Unknown  time           Review of Systems:       Constitutional:  Denies recent weight loss, weight gain, fever or chills, no change in exercise tolerance.     HENT:  Denies any hearing loss, epistaxis, hoarseness, or difficulty speaking.     Eyes: Wears eyeglasses or contact lenses     Respiratory:  Denies dyspnea with exertion,no cough, wheezing, or hemoptysis.     Cardiovascular: Positive for shortness of breath.  Negative for palpitations, chest pain, orthopnea, PND, peripheral edema, syncope, or claudication.     Gastrointestinal:  Denies change in bowel habits, dyspepsia, ulcer disease, hematochezia, or melena.  No nausea, no vomiting, no hematemesis, no diarrhea or constipation.    Endocrine: Negative for cold intolerance, heat intolerance, polydipsia, polyphagia or polyuria. Denies any history of weight change or unintended weight loss.    Genitourinary: Negative for hematuria.  No frequent urination or nocturia.      Musculoskeletal: Denies any history of arthritic symptoms or back problems .  No joint pain, joint stiffness, joint swelling, muscle pain, muscle weakness or neck pain.    Skin:  Denies any change in hair or nails, rashes, or skin lesions.     Allergic/Immunologic: Negative.  Negative for environmental allergies, food allergies or immunocompromised state.     Neurological:  Denies any history of recurrent headaches, strokes, TIA, or seizure disorder.     Hematological: Denies excessive bleeding, easy bruising, fatigue, lymphadenopathy or petechiae or any bleeding disorders.     Psychiatric/Behavioral: Denies any history of depression, substance abuse, or change in cognitive function. Denies any psychomotor reaction or tangential thought.  No depression, homicidal ideations or suicidal ideations.          Objective:     Objective:  Temp:  [97.1 °F (36.2 °C)-97.9 °F (36.6 °C)] 97.1 °F (36.2 °C)  Heart Rate:  [] 99  Resp:  [20] 20  BP: (103-116)/(66-82) 107/70      Body mass index is 26.3 kg/m².            Physical Exam:   General Appearance:    Alert, oriented, cooperative, in no acute distress.   Head:    Normocephalic, atraumatic, without obvious abnormality.   Eyes:           SRHUTI.  Lids and lashes normal, conjunctivae and sclerae normal, no icterus, no pallor.   Ears:    Ears appear intact with no abnormalities noted.   Throat:   Mucous membranes pink and moist.   Neck:   Supple, trachea midline, no carotid bruit, no organomegaly or JVD.   Lungs:     Clear to auscultation and percussion, respirations regular, even and unlabored. No wheezes, rales or rhonchi.    Heart:    Regular rhythm and normal rate, normal S1 and S2, no            murmur, no gallop, no rub, no click.   Abdomen:     Soft, non-tender, non-distended, no guarding, no rebound tenderness, normal bowel sounds in all four quadrants, no masses, liver and spleen nonpalpable.   Genitalia:    Deferred.   Extremities:   Moves all extremities well, no edema, no cyanosis, no              redness, no clubbing.   Pulses:   Pulses palpable and equal bilaterally.   Skin:   Moist and warm. No bleeding, bruising or rash.   Neurologic/Psychiatric:   Alert and oriented to person, place, and time.  Motor, power and tone in upper and lower extremities are grossly intact. No focal neurological deficits. Normal cognitive function. No psychomotor reaction or tangential thought. No depression, homicidal ideations and suicidal ideations.       Medication Review:   Current Facility-Administered Medications   Medication Dose Route Frequency Provider Last Rate Last Dose   • albuterol (PROVENTIL) nebulizer solution 0.083% 2.5 mg/3mL  2.5 mg Nebulization Q6H PRN David Waterman MD       • calcium carbonate (TUMS) chewable tablet 500 mg (200 mg elemental)  2 tablet Oral Daily David Waterman MD   2 tablet at 02/06/20 1026   • furosemide (LASIX) injection 40 mg  40 mg Intravenous Q12H David Waterman MD   40 mg at 02/06/20 2049   • Magnesium Oxide tablet 400  mg  400 mg Oral Daily David Waterman MD   400 mg at 02/06/20 1026   • metoprolol succinate XL (TOPROL-XL) 24 hr tablet 25 mg  25 mg Oral Nightly David Waterman MD   25 mg at 02/06/20 2049   • pantoprazole (PROTONIX) EC tablet 40 mg  40 mg Oral QAM David Waterman MD   40 mg at 02/06/20 1026   • Pharmacy to dose warfarin   Does not apply Continuous PRN David Waterman MD       • sodium chloride 0.9 % flush 10 mL  10 mL Intravenous PRN David Waterman MD       • sodium chloride 0.9 % flush 10 mL  10 mL Intravenous Q12H David Waterman MD   10 mL at 02/06/20 2049   • sodium chloride 0.9 % flush 10 mL  10 mL Intravenous PRN David Waterman MD       • spironolactone (ALDACTONE) half tablet 12.5 mg  12.5 mg Oral Daily Ricardo Grubbs MD   12.5 mg at 02/06/20 1256   • tiZANidine (ZANAFLEX) tablet 4 mg  4 mg Oral Nightly PRN David Waterman MD       • warfarin (COUMADIN) tablet 10 mg  10 mg Oral Nightly David Waterman MD   10 mg at 02/06/20 2049   • zolpidem (AMBIEN) tablet 10 mg  10 mg Oral Nightly PRN David Waterman MD           Lab Review:     Results from last 7 days   Lab Units 02/05/20  0648   SODIUM mmol/L 134*   POTASSIUM mmol/L 4.3   CHLORIDE mmol/L 98   CO2 mmol/L 24.0   BUN mg/dL 18   CREATININE mg/dL 1.02   CALCIUM mg/dL 9.0   BILIRUBIN mg/dL 0.7   ALK PHOS U/L 51   ALT (SGPT) U/L 135*   AST (SGOT) U/L 94*   GLUCOSE mg/dL 106*     Results from last 7 days   Lab Units 02/05/20  0648 02/04/20  0110   TROPONIN T ng/mL <0.010 <0.010         Results from last 7 days   Lab Units 02/05/20  0649   WBC 10*3/mm3 8.02   HEMOGLOBIN g/dL 12.8*   HEMATOCRIT % 39.0   PLATELETS 10*3/mm3 226     Results from last 7 days   Lab Units 02/06/20  0507 02/05/20  0648   INR  1.55* 1.68*                       EKG:   ECG/EMG Results (last 24 hours)     Procedure Component Value Units Date/Time    Adult Transthoracic Echo Complete With Contrast if Necessary Per Protocol [[id number]] Collected:   02/06/20 0859     Updated:  02/06/20 1941     BSA 2.1 m^2       CV ECHO KELSEY - RVDD 4.0 cm      IVSd 0.88 cm      LVIDd 7.2 cm      LVIDs 7.0 cm      LVPWd 0.92 cm      IVS/LVPW 0.95     FS 2.8 %      EDV(Teich) 268.8 ml      ESV(Teich) 252.1 ml      EF(Teich) 6.2 %      EDV(cubed) 367.1 ml      ESV(cubed) 337.2 ml      EF(cubed) 8.1 %      LV mass(C)d 294.1 grams      LV mass(C)dI 143.2 grams/m^2      SV(Teich) 16.6 ml      SI(Teich) 8.1 ml/m^2      SV(cubed) 29.9 ml      SI(cubed) 14.6 ml/m^2      EPSS 3.3 cm      Ao root diam 3.7 cm      Ao root area 10.8 cm^2      ACS 2.7 cm      LA dimension 5.9 cm      asc Aorta Diam 2.9 cm      LA/Ao 1.6     LVOT diam 2.6 cm      LVOT area 5.3 cm^2      LVOT area(traced) 5.3 cm^2      LVLd ap4 9.7 cm      EDV(MOD-sp4) 291.0 ml      LVLs ap4 8.8 cm      ESV(MOD-sp4) 252.0 ml      EF(MOD-sp4) 13.4 %      LVLd ap2 10.2 cm      EDV(MOD-sp2) 249.0 ml      LVLs ap2 9.5 cm      ESV(MOD-sp2) 236.0 ml      EF(MOD-sp2) 5.2 %      SV(MOD-sp4) 39.0 ml      SI(MOD-sp4) 19.0 ml/m^2      SV(MOD-sp2) 13.0 ml      SI(MOD-sp2) 6.3 ml/m^2      Ao root area (BSA corrected) 1.8     LV Hernandez Vol (BSA corrected) 141.7 ml/m^2      LV Sys Vol (BSA corrected) 122.7 ml/m^2      MV E max corine 76.4 cm/sec      MV A max corine 44.9 cm/sec      MV E/A 1.7     MV P1/2t max corine 80.8 cm/sec      MV P1/2t 61.3 msec      MVA(P1/2t) 3.6 cm^2      MV dec slope 386.0 cm/sec^2      Ao pk corine 56.9 cm/sec      Ao max PG 1.3 mmHg      Ao max PG (full) 0.4 mmHg      Ao V2 mean 43.6 cm/sec      Ao mean PG 1.0 mmHg      Ao mean PG (full) 0 mmHg      Ao V2 VTI 7.9 cm      SIVAN(I,A) 4.4 cm^2      SIVAN(I,D) 4.4 cm^2      SIVAN(V,A) 4.4 cm^2      SIVAN(V,D) 4.4 cm^2      LV V1 max PG 0.89 mmHg      LV V1 mean PG 1.0 mmHg      LV V1 max 47.3 cm/sec      LV V1 mean 33.8 cm/sec      LV V1 VTI 6.6 cm      MR max corine 398.0 cm/sec      MR max PG 63.4 mmHg      SV(Ao) 85.4 ml      SI(Ao) 41.6 ml/m^2      SV(LVOT) 35.0 ml      SI(LVOT) 17.1  ml/m^2      PA V2 max 55.8 cm/sec      PA max PG 1.2 mmHg      PA max PG (full) 0.79 mmHg      RV V1 max PG 0.46 mmHg      RV V1 mean PG 0 mmHg      RV V1 max 33.9 cm/sec      RV V1 mean 22.3 cm/sec      RV V1 VTI 4.7 cm      TR max corine 267.0 cm/sec      MVA P1/2T LCG 2.7 cm^2       CV ECHO KELSEY - BZI_BMI 26.2 kilograms/m^2       CV ECHO KELSEY - BSA(HAYCOCK) 2.1 m^2       CV ECHO KELSEY - BZI_METRIC_WEIGHT 85.3 kg       CV ECHO KELSEY - BZI_METRIC_HEIGHT 180.3 cm      Target HR (85%) 155 bpm      Max. Pred. HR (100%) 182 bpm     Narrative:       · Left atrial cavity size is moderately dilated.  · Mild mitral valve regurgitation is present  · Left ventricular systolic function is severely decreased.  · Mild to moderate tricuspid valve regurgitation is present.  · The following left ventricular wall segments are hypokinetic: mid   anterior, apical anterior, basal anterolateral, mid anterolateral, apical   lateral, basal inferolateral, mid inferolateral, apical inferior, mid   inferior, apical septal, basal inferoseptal, mid inferoseptal, apex   hypokinetic, mid anteroseptal, basal anterior, basal inferior and basal   inferoseptal.  · The left ventricular cavity is mildly dilated.             ECHO:  Results for orders placed during the hospital encounter of 02/05/20   Adult Transthoracic Echo Complete With Contrast if Necessary Per Protocol    Narrative · Left atrial cavity size is moderately dilated.  · Mild mitral valve regurgitation is present  · Left ventricular systolic function is severely decreased.  · Mild to moderate tricuspid valve regurgitation is present.  · The following left ventricular wall segments are hypokinetic: mid   anterior, apical anterior, basal anterolateral, mid anterolateral, apical   lateral, basal inferolateral, mid inferolateral, apical inferior, mid   inferior, apical septal, basal inferoseptal, mid inferoseptal, apex   hypokinetic, mid anteroseptal, basal anterior, basal inferior and  basal   inferoseptal.  · The left ventricular cavity is mildly dilated.           Imaging:  Imaging Results (Last 24 Hours)     ** No results found for the last 24 hours. **          I personally viewed and interpreted the patient's EKG/Telemetry data.    Assessment:   1.  Nonischemic cardiomyopathy with an ejection fraction of 10% with recent LifeVest placement.  2.  Left ventricular thrombus on anticoagulation with Coumadin.  3.  Atrial septal defect with right-to-left shunting.  4.  Mitral and tricuspid regurgitation.  5.  Tobacco abuse.  6.  Is noted.  7.  History of scoliosis with chronic back pain          Plan:   1.  Nonischemic cardiomyopathy with a ejection fraction of 10% with left ventricular thrombus on anticoagulation with Coumadin.  Patient currently has a LifeVest.  Patient has been on metoprolol.  Patient is tachycardic with a heart rate over 100 bpm.  Patient would be started on Entresto and digoxin.  Patient metoprolol would be changed to 12.5 mg twice a day.  Patient will be continued on the present dose of the Aldactone and  Lasix.  Patient has been informed for further need for evaluation for cardiac transplant.  Patient has been recommended to decrease her salt intake and to abstain from energy drink.  Patient may need inotropic support if the patient blood pressure remains low.    2.  Atrial septal defect with right-to-left shunting.  I have discussed with the patient the possible need to use atrial septal defect closure device as the patient clearly has on color Doppler right to left shunting and bubble study which had revealed right to left shunting.    3.  Left ventricle thrombus.  Patient would be continued on anticoagulation with Coumadin.    4.  Nonsustained ventricular tachycardia noted on the LifeVest.  Patient has not had any delivery of shock.  Patient would be started on magnesium oxide 400 mg twice a day.    5.  History of attention deficit hyperactivity disorder is noted.    6.   History of scoliosis is noted.    7.  Risk factor modification.  Patient has been counseled to quit smoking.      Thank you for the consultation.    Above plan of management were discussed with the patient        Time: time spent in face-to-face evaluation of greater than 55  minutes and interacting and formulating examining and discussing the plan with the patient with 50% of greater time spent in face-to-face interaction.    Owen Perez MD  02/06/20  9:07 PM  Dictated utilizing Dragon dictation.       Electronically signed by Owen Perez MD at 02/07/20 0102

## 2020-02-07 NOTE — PROGRESS NOTES
"Anticoagulation by Pharmacy - Warfarin    Didier Dahl is a 40 y.o.male  [Ht: 180.3 cm (71\"); Wt: 85.7 kg (189 lb)] on Warfarin for indication of Chronic anticoagulation- hx of left ventricular thrombus.    Goal INR: 2-3  Home dose is 10 mg M/F, 7.5 mg all other days  Today's INR:   Lab Results   Component Value Date    INR 1.82 (H) 02/07/2020          Lab Results   Component Value Date    INR 1.82 (H) 02/07/2020    INR 1.55 (H) 02/06/2020    INR 1.68 (H) 02/05/2020    PROTIME 20.8 (H) 02/07/2020    PROTIME 18.4 (H) 02/06/2020    PROTIME 19.6 (H) 02/05/2020     Lab Results   Component Value Date    HGB 12.8 (L) 02/05/2020    HGB 12.5 (L) 02/04/2020    HGB 12.3 (L) 12/23/2019     Lab Results   Component Value Date    HCT 39.0 02/05/2020    HCT 38.6 02/04/2020    HCT 36.8 (L) 12/23/2019     Lab Results   Component Value Date     02/05/2020     02/04/2020     12/23/2019       Recent Warfarin Administrations       The 5 most recent administrations since 01/31/2020 are shown below each listed medication.    Warfarin Sodium         Order Dose Date Given     warfarin (COUMADIN) tablet 10 mg 10 mg 02/06/2020     warfarin (COUMADIN) tablet 7.5 mg 7.5 mg 02/05/2020                      Assessment/Plan:  Reviewed above labs. INR is 1.82.  INR is increasing goal. No changes in medication list. Concurrent medications include none. Pt did receive dose of warfarin last night.  Will continue current dose of  10 mg for tonight until in goal range. Rx will continue to follow and adjust dose accordingly.      Velia Petit, [facility]  02/07/20 12:14 PM     "

## 2020-02-07 NOTE — PROGRESS NOTES
OP referral to  faxed [phone] on 2/7/2020 at Dr. Perez's request. Patient will be contacted with appointment info.

## 2020-02-07 NOTE — PROGRESS NOTES
[facility]  INPATIENT PROGRESS NOTE    Length of Stay: 0  Date of Admission: 2/5/2020  Primary Care Physician: Provider, No Known    Subjective   Chief Complaint: No new complaints.    HPI: Patient is seen for follow-up today.  He is a 37-year-old male with history of nonischemic cardiomyopathy (ejection fraction of about 10%), heart failure with decreased ejection fraction, anxiety and GERD who was admitted for acute on chronic systolic heart failure.  He is doing well, less symptomatic and is off supplemental oxygen and maintaining saturation of 97 100% on room air. He voices no new complaints.    Review of Systems   Constitutional: Positive for fatigue. Negative for activity change, appetite change, chills, diaphoresis and fever.   HENT: Negative for trouble swallowing and voice change.    Eyes: Negative for photophobia and visual disturbance.   Respiratory: Negative for cough, choking, chest tightness, shortness of breath, wheezing and stridor.    Cardiovascular: Negative for chest pain, palpitations and leg swelling.   Gastrointestinal: Negative for abdominal distention, abdominal pain, blood in stool, constipation, diarrhea, nausea and vomiting.   Endocrine: Negative for cold intolerance, heat intolerance, polydipsia, polyphagia and polyuria.   Genitourinary: Negative for decreased urine volume, difficulty urinating, dysuria, enuresis, flank pain, frequency, hematuria and urgency.   Musculoskeletal: Negative for arthralgias, gait problem, myalgias, neck pain and neck stiffness.   Skin: Negative for pallor, rash and wound.   Neurological: Negative for dizziness, tremors, seizures, syncope, facial asymmetry, speech difficulty, weakness, light-headedness, numbness and headaches.   Hematological: Does not bruise/bleed easily.   Psychiatric/Behavioral: Negative for agitation, behavioral problems and confusion.       Objective    Temp:  [97 °F (36.1  °C)-97.9 °F (36.6 °C)] 97.7 °F (36.5 °C)  Heart Rate:  [] 94  Resp:  [18-20] 18  BP: ()/(59-82) 94/59    Physical Exam   Constitutional: He is oriented to person, place, and time. He appears well-developed and well-nourished. No distress.   HENT:   Head: Normocephalic and atraumatic.   Eyes: Pupils are equal, round, and reactive to light. EOM are normal. No scleral icterus.   Neck: Normal range of motion. Neck supple. No JVD present. No thyromegaly present.   Cardiovascular: Normal rate, regular rhythm and normal heart sounds. Exam reveals no gallop and no friction rub.   No murmur heard.  Pulmonary/Chest: Effort normal and breath sounds normal. He has no wheezes. He has no rales. He exhibits no tenderness.   Abdominal: Soft. Bowel sounds are normal. He exhibits no distension and no mass. There is no tenderness. There is no rebound and no guarding.   Musculoskeletal: He exhibits no edema, tenderness or deformity.   He has trace pedal edema both legs normal.   Neurological: He is alert and oriented to person, place, and time. No cranial nerve deficit or sensory deficit. He exhibits normal muscle tone. Coordination normal.   Skin: Skin is warm and dry. No rash noted. He is not diaphoretic. No erythema. No pallor.   Psychiatric: He has a normal mood and affect. His behavior is normal. Judgment and thought content normal.   Nursing note and vitals reviewed.        Medication Review:    Current Facility-Administered Medications:   •  albuterol (PROVENTIL) nebulizer solution 0.083% 2.5 mg/3mL, 2.5 mg, Nebulization, Q6H PRN, David Waterman MD  •  calcium carbonate (TUMS) chewable tablet 500 mg (200 mg elemental), 2 tablet, Oral, Daily, David Waterman MD, 2 tablet at 02/07/20 0834  •  digoxin (LANOXIN) tablet 125 mcg, 125 mcg, Oral, Daily, Owen Perez MD  •  furosemide (LASIX) injection 40 mg, 40 mg, Intravenous, Q12H, David Waterman MD, 40 mg at 02/07/20 0833  •  Magnesium Oxide tablet 400 mg,  400 mg, Oral, BID, Owen Perez MD, 400 mg at 02/07/20 0834  •  metoprolol tartrate (LOPRESSOR) half tablet 12.5 mg, 12.5 mg, Oral, Q12H, Owen Perez MD  •  pantoprazole (PROTONIX) EC tablet 40 mg, 40 mg, Oral, QAM, David Waterman MD, 40 mg at 02/07/20 0619  •  Pharmacy to dose warfarin, , Does not apply, Continuous PRN, David Waterman MD  •  sacubitril-valsartan (ENTRESTO) 24-26 MG tablet 1 tablet, 1 tablet, Oral, Q12H, Owen Perez MD, 1 tablet at 02/07/20 0421  •  sodium chloride 0.9 % flush 10 mL, 10 mL, Intravenous, PRN, David Waterman MD  •  sodium chloride 0.9 % flush 10 mL, 10 mL, Intravenous, Q12H, David Waterman MD, 10 mL at 02/07/20 0835  •  sodium chloride 0.9 % flush 10 mL, 10 mL, Intravenous, PRN, David Waterman MD  •  spironolactone (ALDACTONE) half tablet 12.5 mg, 12.5 mg, Oral, Daily, Ricardo Grubbs MD, 12.5 mg at 02/07/20 0834  •  tiZANidine (ZANAFLEX) tablet 4 mg, 4 mg, Oral, Nightly PRN, David Waterman MD  •  warfarin (COUMADIN) tablet 10 mg, 10 mg, Oral, Nightly, David Waterman MD, 10 mg at 02/06/20 2049  •  zolpidem (AMBIEN) tablet 10 mg, 10 mg, Oral, Nightly PRN, David Waterman MD    Results Review:  I have reviewed the labs, radiology results, and diagnostic studies.    Laboratory Data:   Results from last 7 days   Lab Units 02/05/20  0648 02/04/20  0110   SODIUM mmol/L 134* 130*   POTASSIUM mmol/L 4.3 4.0   CHLORIDE mmol/L 98 95*   CO2 mmol/L 24.0 23.0   BUN mg/dL 18 14   CREATININE mg/dL 1.02 0.97   GLUCOSE mg/dL 106* 117*   CALCIUM mg/dL 9.0 8.8   BILIRUBIN mg/dL 0.7 1.1   ALK PHOS U/L 51 57   ALT (SGPT) U/L 135* 148*   AST (SGOT) U/L 94* 134*   ANION GAP mmol/L 12.0 12.0     Estimated Creatinine Clearance: 119 mL/min (by C-G formula based on SCr of 1.02 mg/dL).          Results from last 7 days   Lab Units 02/05/20  0649 02/04/20  0110   WBC 10*3/mm3 8.02 6.94   HEMOGLOBIN g/dL 12.8* 12.5*   HEMATOCRIT % 39.0 38.6   PLATELETS 10*3/mm3 226  235     Results from last 7 days   Lab Units 02/07/20  0523 02/06/20  0507 02/05/20  0648   INR  1.82* 1.55* 1.68*       Culture Data:   No results found for: BLOODCX  No results found for: URINECX  No results found for: RESPCX  No results found for: WOUNDCX  No results found for: STOOLCX  No components found for: BODYFLD    Radiology Data:   Imaging Results (Last 24 Hours)     ** No results found for the last 24 hours. **          I have reviewed the patient's current medications.     Assessment/Plan     Hospital Problem List:  Active Problems:  Nonischemic cardiomyopathy complicated by acute on chronic HFrEF (heart failure with reduced ejection fraction): Improving.  Continue diuretic therapy, beta-blocker with strict I's and O's, daily weights, salt and fluid restriction with digoxin, Entresto and Aldactone.  Cardiac transplantation has been discussed with the patient. Dr. Perez is following.  Echocardiogram done 2/6/2020 showed:  Left Ventricle Left ventricular systolic function is severely decreased. Estimated EF appears to be in the range of less than 20%. There is left ventricular global hypokinesis noted.   The left ventricular cavity is mildly dilated. Left ventricular diastolic function is normal. The presence of a left ventricular thrombus cannot be excluded. Spontaneous contrast consistent with low flow rate is noted in the left ventricle.   Right Ventricle Normal right ventricular cavity size noted. No evidence of a right ventricular thrombus present. No evidence of a right ventricular mass present.   Left Atrium Left atrial cavity size is moderately dilated. No evidence of a left atrial thrombus present. No evidence of a left atrial mass present. There is no spontaneous echo contrast present. Interatrial septal defect present with right to left shunting indicated by color flow Doppler and saline contrast. Saline test results are positive for right to left atrial level shunt.   Right Atrium Normal  right atrial size noted. No evidence of a right atrial thrombus present. No evidence of a right atrial mass present.   Aortic Valve The aortic valve is grossly normal in structure. The valve appears trileaflet. No significant aortic valve regurgitation is present.   Mitral Valve The mitral valve is grossly normal in structure. Mild mitral valve regurgitation is present.   Tricuspid Valve The tricuspid valve is grossly normal. Mild to moderate tricuspid valve regurgitation is present.   Greater Vessels No dilation of the aortic root is present.   Pericardium The pericardium is normal. There is no evidence of pericardial effusion.     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    History of left ventricular thrombus: Continue anticoagulation with Coumadin.  INR is 1.82.  Continue to monitor.    History of nonsustained V. tach: He has been started on magnesium oxide.  Cardiologist is following.    History of ADHD: Continue Vyvanse.     Continue PPI for GERD/DVT prophylaxis with warfarin.        Discharge Planning: In progress.    Ricardo Grubbs MD   02/07/20   10:43 AM

## 2020-02-07 NOTE — CONSULTS
Cardiology Consultation Note.        Patient Name: Didier Dahl  Age/Sex: 40 y.o. male  : 1980  MRN: [id number]    Date of consultation: 2020  Consulting Physician: Owen Perez MD  Primary care Physician: Provider, No Known  Requesting Physician:   David Waterman MD     Reason for consultation: Nonischemic cardiomyopathy with severe left ventricular systolic dysfunction.    Subjective:       Chief Complaint: Shortness of breath.      History of Present Illness:  Didier Dahl is a 40 y.o. male     Body mass index is 26.3 kg/m². with a past medical history arterial hypertension, hypertensive heart disease, gastroesophageal reflux disease, chronic back pain, history of scoliosis, history of attention deficit hyperactivity disorder, history of tobacco abuse who was initially evaluated on 2019 in the emergency room with symptoms of shortness of breath and was found to have pulmonary edema and pneumonia but had refused hospitalization and had signed AGAINST MEDICAL ADVICE from the emergency room.    Patient subsequently had presented to [facility] with recurrent symptoms of shortness of breath.  Patient had episodes of hypotension and was subsequently transferred from [facility] in [place] to [facility] in [place].    Patient underwent further evaluation at [facility] with a transthoracic echocardiogram which had revealed severe left ventricular systolic dysfunction with an ejection fraction of 10% with left ventricular thrombus and a coronary angiogram which had not reveal no evidence of any obstructive epicardial coronary artery disease and patient was treated for nonischemic cardiomyopathy and discharge on a LifeVest.    Patient was scheduled for outpatient follow-up with cardiology.    Patient had increasing episodes of shortness of breath on discharge from [facility] and presented to [facility]  [place].    Patient underwent repeat transthoracic echocardiogram which revealed left ventricular systolic dysfunction with an atrial septal defect with right-to-left shunting.  Patient has not been compliant with his salt intake.  Patient had symptoms of shortness of breath which has been increasing in severity.  Patient LifeVest evaluation had revealed evidence of nonsustained ventricular tachycardia.    Patient at the present time has symptoms of shortness of breath.  Patient complains of feeling tired and fatigued.  Patient denies any symptoms of palpitation.  Patient denies any nausea vomiting.  Patient is currently on anticoagulation with Coumadin.    Patient 10 point review of system except for stated in the history of present illness and negative.          Past Medical History:  1.  Shortness of breath.  2.  Nonischemic cardiomyopathy with left ventricular dilatation with an ejection fraction of 10%.  3.  Left ventricular thrombus on anticoagulation with Coumadin.  4.  Coronary angiogram done at [facility] in December 2019 had revealed no evidence of any obstructive epicardial coronary artery disease.  5.  CTA of the chest done at [facility] did not reveal of any evidence of any pulmonary embolism.  6.  Right heart catheterization had revealed elevated pulmonary capillary wedge pressure in December 2019.  7.  LifeVest evaluation had revealed evidence of nonsustained ventricular tachycardia.  8.  History of arterial hypertension.  9.  Moderate tricuspid regurgitation with mild to moderate mitral regurgitation.  10.  Atrial septal defect with right-to-left shunting with positive bubble study.  11.  Chronic back pain.  12.  Attention deficit hyperactivity disorder.  13.  History of asthmatic bronchitis.  14.  Gastroesophageal reflux disease.  15.  Prostate enlargement.  16.  History of scoliosis.  17.  Chronic anticoagulation with Coumadin since December 2019.      Past Surgical  History:  1.  Left lung surgical intervention with removal of the nail.  2.  Varicose vein stripping by Dr. Barbosa.          Family History: Family history significant for mother who had congestive heart failure      Social History: Smokes up to half pack per day.  Previous history of drug abuse in the remote past.  Previous history of alcohol abuse.       Cardiac Risk Factors:   1.  Male.  2.  Arterial hypertension.  3.  Tobacco abuse.    Allergies:  Allergies   Allergen Reactions   • Chantix [Varenicline]        Medication:  Medications Prior to Admission   Medication Sig Dispense Refill Last Dose   • Calcium Carbonate Antacid 1250 MG/5ML Take  by mouth.      • furosemide (LASIX) 20 MG tablet Take 20 mg by mouth 2 (Two) Times a Day.      • lisdexamfetamine (VYVANSE) 70 MG capsule Take 70 mg by mouth Every Morning Pt takes 1/2 in the morning and 1/2 in the afternoon    12/3/2019 at Unknown time   • magnesium oxide (MAGOX) 400 (241.3 Mg) MG tablet tablet Take 400 mg by mouth Daily.      • metoprolol succinate XL (TOPROL-XL) 25 MG 24 hr tablet Take 25 mg by mouth Every Night.      • omeprazole (priLOSEC) 20 MG capsule Take 20 mg by mouth Daily.   2/4/2020 at Unknown time   • tiZANidine (ZANAFLEX) 4 MG tablet Take 4 mg by mouth At Night As Needed for Muscle Spasms.   More than a month at Unknown time   • vitamin D (ERGOCALCIFEROL) 1.25 MG (71094 UT) capsule capsule Take 50,000 Units by mouth Every 7 (Seven) Days.      • warfarin (COUMADIN) 7.5 MG tablet Take 7.5 mg by mouth Every Night.      • zolpidem (AMBIEN) 10 MG tablet Take 10 mg by mouth At Night As Needed for Sleep.   12/3/2019 at Unknown time   • albuterol sulfate  (90 Base) MCG/ACT inhaler Inhale 2 puffs Every 4 (Four) Hours As Needed for Wheezing. 1 inhaler 0 Unknown at Unknown time           Review of Systems:       Constitutional:  Denies recent weight loss, weight gain, fever or chills, no change in exercise tolerance.     HENT:  Denies any  hearing loss, epistaxis, hoarseness, or difficulty speaking.     Eyes: Wears eyeglasses or contact lenses     Respiratory:  Denies dyspnea with exertion,no cough, wheezing, or hemoptysis.     Cardiovascular: Positive for shortness of breath.  Negative for palpitations, chest pain, orthopnea, PND, peripheral edema, syncope, or claudication.     Gastrointestinal:  Denies change in bowel habits, dyspepsia, ulcer disease, hematochezia, or melena.  No nausea, no vomiting, no hematemesis, no diarrhea or constipation.    Endocrine: Negative for cold intolerance, heat intolerance, polydipsia, polyphagia or polyuria. Denies any history of weight change or unintended weight loss.    Genitourinary: Negative for hematuria.  No frequent urination or nocturia.      Musculoskeletal: Denies any history of arthritic symptoms or back problems .  No joint pain, joint stiffness, joint swelling, muscle pain, muscle weakness or neck pain.    Skin:  Denies any change in hair or nails, rashes, or skin lesions.     Allergic/Immunologic: Negative.  Negative for environmental allergies, food allergies or immunocompromised state.     Neurological:  Denies any history of recurrent headaches, strokes, TIA, or seizure disorder.     Hematological: Denies excessive bleeding, easy bruising, fatigue, lymphadenopathy or petechiae or any bleeding disorders.     Psychiatric/Behavioral: Denies any history of depression, substance abuse, or change in cognitive function. Denies any psychomotor reaction or tangential thought.  No depression, homicidal ideations or suicidal ideations.          Objective:     Objective:  Temp:  [97.1 °F (36.2 °C)-97.9 °F (36.6 °C)] 97.1 °F (36.2 °C)  Heart Rate:  [] 99  Resp:  [20] 20  BP: (103-116)/(66-82) 107/70      Body mass index is 26.3 kg/m².           Physical Exam:   General Appearance:    Alert, oriented, cooperative, in no acute distress.   Head:    Normocephalic, atraumatic, without obvious abnormality.    Eyes:           SHRUTI.  Lids and lashes normal, conjunctivae and sclerae normal, no icterus, no pallor.   Ears:    Ears appear intact with no abnormalities noted.   Throat:   Mucous membranes pink and moist.   Neck:   Supple, trachea midline, no carotid bruit, no organomegaly or JVD.   Lungs:     Clear to auscultation and percussion, respirations regular, even and unlabored. No wheezes, rales or rhonchi.    Heart:    Regular rhythm and normal rate, normal S1 and S2, no            murmur, no gallop, no rub, no click.   Abdomen:     Soft, non-tender, non-distended, no guarding, no rebound tenderness, normal bowel sounds in all four quadrants, no masses, liver and spleen nonpalpable.   Genitalia:    Deferred.   Extremities:   Moves all extremities well, no edema, no cyanosis, no              redness, no clubbing.   Pulses:   Pulses palpable and equal bilaterally.   Skin:   Moist and warm. No bleeding, bruising or rash.   Neurologic/Psychiatric:   Alert and oriented to person, place, and time.  Motor, power and tone in upper and lower extremities are grossly intact. No focal neurological deficits. Normal cognitive function. No psychomotor reaction or tangential thought. No depression, homicidal ideations and suicidal ideations.       Medication Review:   Current Facility-Administered Medications   Medication Dose Route Frequency Provider Last Rate Last Dose   • albuterol (PROVENTIL) nebulizer solution 0.083% 2.5 mg/3mL  2.5 mg Nebulization Q6H PRN David Waterman MD       • calcium carbonate (TUMS) chewable tablet 500 mg (200 mg elemental)  2 tablet Oral Daily David Waterman MD   2 tablet at 02/06/20 1026   • furosemide (LASIX) injection 40 mg  40 mg Intravenous Q12H David Waterman MD   40 mg at 02/06/20 2049   • Magnesium Oxide tablet 400 mg  400 mg Oral Daily David Waterman MD   400 mg at 02/06/20 1026   • metoprolol succinate XL (TOPROL-XL) 24 hr tablet 25 mg  25 mg Oral Nightly David Waterman MD    25 mg at 02/06/20 2049   • pantoprazole (PROTONIX) EC tablet 40 mg  40 mg Oral QAM David Waterman MD   40 mg at 02/06/20 1026   • Pharmacy to dose warfarin   Does not apply Continuous PRN David Waterman MD       • sodium chloride 0.9 % flush 10 mL  10 mL Intravenous PRN David Waterman MD       • sodium chloride 0.9 % flush 10 mL  10 mL Intravenous Q12H David Waterman MD   10 mL at 02/06/20 2049   • sodium chloride 0.9 % flush 10 mL  10 mL Intravenous PRN David Waterman MD       • spironolactone (ALDACTONE) half tablet 12.5 mg  12.5 mg Oral Daily Ricardo Grubbs MD   12.5 mg at 02/06/20 1256   • tiZANidine (ZANAFLEX) tablet 4 mg  4 mg Oral Nightly PRN David Waterman MD       • warfarin (COUMADIN) tablet 10 mg  10 mg Oral Nightly David Waterman MD   10 mg at 02/06/20 2049   • zolpidem (AMBIEN) tablet 10 mg  10 mg Oral Nightly PRN David Waterman MD           Lab Review:     Results from last 7 days   Lab Units 02/05/20  0648   SODIUM mmol/L 134*   POTASSIUM mmol/L 4.3   CHLORIDE mmol/L 98   CO2 mmol/L 24.0   BUN mg/dL 18   CREATININE mg/dL 1.02   CALCIUM mg/dL 9.0   BILIRUBIN mg/dL 0.7   ALK PHOS U/L 51   ALT (SGPT) U/L 135*   AST (SGOT) U/L 94*   GLUCOSE mg/dL 106*     Results from last 7 days   Lab Units 02/05/20  0648 02/04/20  0110   TROPONIN T ng/mL <0.010 <0.010         Results from last 7 days   Lab Units 02/05/20  0649   WBC 10*3/mm3 8.02   HEMOGLOBIN g/dL 12.8*   HEMATOCRIT % 39.0   PLATELETS 10*3/mm3 226     Results from last 7 days   Lab Units 02/06/20  0507 02/05/20  0648   INR  1.55* 1.68*                       EKG:   ECG/EMG Results (last 24 hours)     Procedure Component Value Units Date/Time    Adult Transthoracic Echo Complete With Contrast if Necessary Per Protocol [[id number]] Collected:  02/06/20 0859     Updated:  02/06/20 1941     BSA 2.1 m^2       CV ECHO KELSEY - RVDD 4.0 cm      IVSd 0.88 cm      LVIDd 7.2 cm      LVIDs 7.0 cm      LVPWd 0.92 cm       IVS/LVPW 0.95     FS 2.8 %      EDV(Teich) 268.8 ml      ESV(Teich) 252.1 ml      EF(Teich) 6.2 %      EDV(cubed) 367.1 ml      ESV(cubed) 337.2 ml      EF(cubed) 8.1 %      LV mass(C)d 294.1 grams      LV mass(C)dI 143.2 grams/m^2      SV(Teich) 16.6 ml      SI(Teich) 8.1 ml/m^2      SV(cubed) 29.9 ml      SI(cubed) 14.6 ml/m^2      EPSS 3.3 cm      Ao root diam 3.7 cm      Ao root area 10.8 cm^2      ACS 2.7 cm      LA dimension 5.9 cm      asc Aorta Diam 2.9 cm      LA/Ao 1.6     LVOT diam 2.6 cm      LVOT area 5.3 cm^2      LVOT area(traced) 5.3 cm^2      LVLd ap4 9.7 cm      EDV(MOD-sp4) 291.0 ml      LVLs ap4 8.8 cm      ESV(MOD-sp4) 252.0 ml      EF(MOD-sp4) 13.4 %      LVLd ap2 10.2 cm      EDV(MOD-sp2) 249.0 ml      LVLs ap2 9.5 cm      ESV(MOD-sp2) 236.0 ml      EF(MOD-sp2) 5.2 %      SV(MOD-sp4) 39.0 ml      SI(MOD-sp4) 19.0 ml/m^2      SV(MOD-sp2) 13.0 ml      SI(MOD-sp2) 6.3 ml/m^2      Ao root area (BSA corrected) 1.8     LV Hernandez Vol (BSA corrected) 141.7 ml/m^2      LV Sys Vol (BSA corrected) 122.7 ml/m^2      MV E max corine 76.4 cm/sec      MV A max corine 44.9 cm/sec      MV E/A 1.7     MV P1/2t max corine 80.8 cm/sec      MV P1/2t 61.3 msec      MVA(P1/2t) 3.6 cm^2      MV dec slope 386.0 cm/sec^2      Ao pk corine 56.9 cm/sec      Ao max PG 1.3 mmHg      Ao max PG (full) 0.4 mmHg      Ao V2 mean 43.6 cm/sec      Ao mean PG 1.0 mmHg      Ao mean PG (full) 0 mmHg      Ao V2 VTI 7.9 cm      SIVAN(I,A) 4.4 cm^2      SIVAN(I,D) 4.4 cm^2      SIAVN(V,A) 4.4 cm^2      SIVAN(V,D) 4.4 cm^2      LV V1 max PG 0.89 mmHg      LV V1 mean PG 1.0 mmHg      LV V1 max 47.3 cm/sec      LV V1 mean 33.8 cm/sec      LV V1 VTI 6.6 cm      MR max corine 398.0 cm/sec      MR max PG 63.4 mmHg      SV(Ao) 85.4 ml      SI(Ao) 41.6 ml/m^2      SV(LVOT) 35.0 ml      SI(LVOT) 17.1 ml/m^2      PA V2 max 55.8 cm/sec      PA max PG 1.2 mmHg      PA max PG (full) 0.79 mmHg      RV V1 max PG 0.46 mmHg      RV V1 mean PG 0 mmHg      RV V1 max 33.9 cm/sec       RV V1 mean 22.3 cm/sec      RV V1 VTI 4.7 cm      TR max corine 267.0 cm/sec      MVA P1/2T LCG 2.7 cm^2       CV ECHO KELSEY - BZI_BMI 26.2 kilograms/m^2       CV ECHO KELSEY - BSA(HAYCOCK) 2.1 m^2       CV ECHO KELSEY - BZI_METRIC_WEIGHT 85.3 kg       CV ECHO KELSEY - BZI_METRIC_HEIGHT 180.3 cm      Target HR (85%) 155 bpm      Max. Pred. HR (100%) 182 bpm     Narrative:       · Left atrial cavity size is moderately dilated.  · Mild mitral valve regurgitation is present  · Left ventricular systolic function is severely decreased.  · Mild to moderate tricuspid valve regurgitation is present.  · The following left ventricular wall segments are hypokinetic: mid   anterior, apical anterior, basal anterolateral, mid anterolateral, apical   lateral, basal inferolateral, mid inferolateral, apical inferior, mid   inferior, apical septal, basal inferoseptal, mid inferoseptal, apex   hypokinetic, mid anteroseptal, basal anterior, basal inferior and basal   inferoseptal.  · The left ventricular cavity is mildly dilated.             ECHO:  Results for orders placed during the hospital encounter of 02/05/20   Adult Transthoracic Echo Complete With Contrast if Necessary Per Protocol    Narrative · Left atrial cavity size is moderately dilated.  · Mild mitral valve regurgitation is present  · Left ventricular systolic function is severely decreased.  · Mild to moderate tricuspid valve regurgitation is present.  · The following left ventricular wall segments are hypokinetic: mid   anterior, apical anterior, basal anterolateral, mid anterolateral, apical   lateral, basal inferolateral, mid inferolateral, apical inferior, mid   inferior, apical septal, basal inferoseptal, mid inferoseptal, apex   hypokinetic, mid anteroseptal, basal anterior, basal inferior and basal   inferoseptal.  · The left ventricular cavity is mildly dilated.           Imaging:  Imaging Results (Last 24 Hours)     ** No results found for the last 24 hours.  **          I personally viewed and interpreted the patient's EKG/Telemetry data.    Assessment:   1.  Nonischemic cardiomyopathy with an ejection fraction of 10% with recent LifeVest placement.  2.  Left ventricular thrombus on anticoagulation with Coumadin.  3.  Atrial septal defect with right-to-left shunting.  4.  Mitral and tricuspid regurgitation.  5.  Tobacco abuse.  6.  Is noted.  7.  History of scoliosis with chronic back pain          Plan:   1.  Nonischemic cardiomyopathy with a ejection fraction of 10% with left ventricular thrombus on anticoagulation with Coumadin.  Patient currently has a LifeVest.  Patient has been on metoprolol.  Patient is tachycardic with a heart rate over 100 bpm.  Patient would be started on Entresto and digoxin.  Patient metoprolol would be changed to 12.5 mg twice a day.  Patient will be continued on the present dose of the Aldactone and  Lasix.  Patient has been informed for further need for evaluation for cardiac transplant.  Patient has been recommended to decrease her salt intake and to abstain from energy drink.  Patient may need inotropic support if the patient blood pressure remains low.    2.  Atrial septal defect with right-to-left shunting.  I have discussed with the patient the possible need to use atrial septal defect closure device as the patient clearly has on color Doppler right to left shunting and bubble study which had revealed right to left shunting.    3.  Left ventricle thrombus.  Patient would be continued on anticoagulation with Coumadin.    4.  Nonsustained ventricular tachycardia noted on the LifeVest.  Patient has not had any delivery of shock.  Patient would be started on magnesium oxide 400 mg twice a day.    5.  History of attention deficit hyperactivity disorder is noted.    6.  History of scoliosis is noted.    7.  Risk factor modification.  Patient has been counseled to quit smoking.      Thank you for the consultation.    Above plan of  management were discussed with the patient        Time: time spent in face-to-face evaluation of greater than 55  minutes and interacting and formulating examining and discussing the plan with the patient with 50% of greater time spent in face-to-face interaction.    Owen Perez MD  02/06/20  9:07 PM  Dictated utilizing Dragon dictation.

## 2020-02-07 NOTE — PLAN OF CARE
Problem: Patient Care Overview  Goal: Plan of Care Review  Outcome: Ongoing (interventions implemented as appropriate)  Goal: Individualization and Mutuality  Outcome: Ongoing (interventions implemented as appropriate)  Goal: Discharge Needs Assessment  Outcome: Ongoing (interventions implemented as appropriate)  Goal: Interprofessional Rounds/Family Conf  Outcome: Ongoing (interventions implemented as appropriate)     Problem: Fluid Volume Excess (Adult)  Goal: Identify Related Risk Factors and Signs and Symptoms  Outcome: Ongoing (interventions implemented as appropriate)  Goal: Optimal Fluid Balance  Outcome: Ongoing (interventions implemented as appropriate)     Problem: Cardiac: Heart Failure (Adult)  Goal: Signs and Symptoms of Listed Potential Problems Will be Absent, Minimized or Managed (Cardiac: Heart Failure)  Outcome: Ongoing (interventions implemented as appropriate)     Problem: Pain, Chronic (Adult)  Goal: Identify Related Risk Factors and Signs and Symptoms  Outcome: Ongoing (interventions implemented as appropriate)  Goal: Acceptable Pain/Comfort Level and Functional Ability  Outcome: Ongoing (interventions implemented as appropriate)

## 2020-02-07 NOTE — NURSING NOTE
Pt had two runs of wide qrs with tachycardia. Pt asymptomatic. Notified Dr. Grubbs. Encouraged pt to wear life vest again. Cardiology has not yet seen pt. Will notify Dr. Perez when he comes to floor. Pt reapplied vest.

## 2020-02-08 VITALS
HEART RATE: 100 BPM | BODY MASS INDEX: 26.23 KG/M2 | WEIGHT: 187.4 LBS | RESPIRATION RATE: 18 BRPM | TEMPERATURE: 96.1 F | OXYGEN SATURATION: 98 % | SYSTOLIC BLOOD PRESSURE: 108 MMHG | DIASTOLIC BLOOD PRESSURE: 75 MMHG | HEIGHT: 71 IN

## 2020-02-08 LAB
ANION GAP SERPL CALCULATED.3IONS-SCNC: 11 MMOL/L (ref 5–15)
BASOPHILS # BLD AUTO: 0.07 10*3/MM3 (ref 0–0.2)
BASOPHILS NFR BLD AUTO: 1 % (ref 0–1.5)
BUN BLD-MCNC: 12 MG/DL (ref 6–20)
BUN/CREAT SERPL: 10.3 (ref 7–25)
CALCIUM SPEC-SCNC: 9 MG/DL (ref 8.6–10.5)
CHLORIDE SERPL-SCNC: 101 MMOL/L (ref 98–107)
CO2 SERPL-SCNC: 29 MMOL/L (ref 22–29)
CREAT BLD-MCNC: 1.16 MG/DL (ref 0.76–1.27)
DEPRECATED RDW RBC AUTO: 44.9 FL (ref 37–54)
EOSINOPHIL # BLD AUTO: 0.27 10*3/MM3 (ref 0–0.4)
EOSINOPHIL NFR BLD AUTO: 4 % (ref 0.3–6.2)
ERYTHROCYTE [DISTWIDTH] IN BLOOD BY AUTOMATED COUNT: 14 % (ref 12.3–15.4)
GFR SERPL CREATININE-BSD FRML MDRD: 70 ML/MIN/1.73
GLUCOSE BLD-MCNC: 87 MG/DL (ref 65–99)
HCT VFR BLD AUTO: 40.7 % (ref 37.5–51)
HGB BLD-MCNC: 13.3 G/DL (ref 13–17.7)
IMM GRANULOCYTES # BLD AUTO: 0.01 10*3/MM3 (ref 0–0.05)
IMM GRANULOCYTES NFR BLD AUTO: 0.1 % (ref 0–0.5)
INR PPP: 2.01 (ref 0.8–1.2)
LYMPHOCYTES # BLD AUTO: 2.55 10*3/MM3 (ref 0.7–3.1)
LYMPHOCYTES NFR BLD AUTO: 38.1 % (ref 19.6–45.3)
MCH RBC QN AUTO: 28.9 PG (ref 26.6–33)
MCHC RBC AUTO-ENTMCNC: 32.7 G/DL (ref 31.5–35.7)
MCV RBC AUTO: 88.5 FL (ref 79–97)
MONOCYTES # BLD AUTO: 0.7 10*3/MM3 (ref 0.1–0.9)
MONOCYTES NFR BLD AUTO: 10.5 % (ref 5–12)
NEUTROPHILS # BLD AUTO: 3.09 10*3/MM3 (ref 1.7–7)
NEUTROPHILS NFR BLD AUTO: 46.3 % (ref 42.7–76)
NRBC BLD AUTO-RTO: 0 /100 WBC (ref 0–0.2)
PLATELET # BLD AUTO: 211 10*3/MM3 (ref 140–450)
PMV BLD AUTO: 10.8 FL (ref 6–12)
POTASSIUM BLD-SCNC: 3.9 MMOL/L (ref 3.5–5.2)
PROTHROMBIN TIME: 22.5 SECONDS (ref 11.1–15.3)
RBC # BLD AUTO: 4.6 10*6/MM3 (ref 4.14–5.8)
SODIUM BLD-SCNC: 141 MMOL/L (ref 136–145)
WBC NRBC COR # BLD: 6.69 10*3/MM3 (ref 3.4–10.8)

## 2020-02-08 PROCEDURE — 80048 BASIC METABOLIC PNL TOTAL CA: CPT | Performed by: INTERNAL MEDICINE

## 2020-02-08 PROCEDURE — 25010000002 FUROSEMIDE PER 20 MG: Performed by: HOSPITALIST

## 2020-02-08 PROCEDURE — 85025 COMPLETE CBC W/AUTO DIFF WBC: CPT | Performed by: INTERNAL MEDICINE

## 2020-02-08 PROCEDURE — 85610 PROTHROMBIN TIME: CPT | Performed by: HOSPITALIST

## 2020-02-08 PROCEDURE — 93010 ELECTROCARDIOGRAM REPORT: CPT | Performed by: INTERNAL MEDICINE

## 2020-02-08 PROCEDURE — 93005 ELECTROCARDIOGRAM TRACING: CPT | Performed by: INTERNAL MEDICINE

## 2020-02-08 RX ORDER — FUROSEMIDE 40 MG/1
40 TABLET ORAL 2 TIMES DAILY
Qty: 60 TABLET | Refills: 1 | Status: SHIPPED | OUTPATIENT
Start: 2020-02-08 | End: 2021-06-24 | Stop reason: DRUGHIGH

## 2020-02-08 RX ORDER — DIGOXIN 250 MCG
250 TABLET ORAL
Status: DISCONTINUED | OUTPATIENT
Start: 2020-02-09 | End: 2020-02-08 | Stop reason: HOSPADM

## 2020-02-08 RX ORDER — DIGOXIN 250 MCG
250 TABLET ORAL
Qty: 30 TABLET | Refills: 1 | Status: SHIPPED | OUTPATIENT
Start: 2020-02-09

## 2020-02-08 RX ORDER — LANOLIN ALCOHOL/MO/W.PET/CERES
400 CREAM (GRAM) TOPICAL 2 TIMES DAILY
Qty: 60 TABLET | Refills: 1 | Status: SHIPPED | OUTPATIENT
Start: 2020-02-08

## 2020-02-08 RX ORDER — SPIRONOLACTONE 25 MG/1
12.5 TABLET ORAL DAILY
Qty: 30 TABLET | Refills: 1 | Status: SHIPPED | OUTPATIENT
Start: 2020-02-09

## 2020-02-08 RX ADMIN — CALCIUM CARBONATE (ANTACID) CHEW TAB 500 MG 2 TABLET: 500 CHEW TAB at 09:01

## 2020-02-08 RX ADMIN — METOPROLOL TARTRATE 12.5 MG: 25 TABLET, FILM COATED ORAL at 11:20

## 2020-02-08 RX ADMIN — FUROSEMIDE 40 MG: 10 INJECTION, SOLUTION INTRAMUSCULAR; INTRAVENOUS at 09:02

## 2020-02-08 RX ADMIN — Medication 12.5 MG: at 09:01

## 2020-02-08 RX ADMIN — PANTOPRAZOLE SODIUM 40 MG: 40 TABLET, DELAYED RELEASE ORAL at 09:04

## 2020-02-08 RX ADMIN — Medication 400 MG: at 09:01

## 2020-02-08 RX ADMIN — DIGOXIN 125 MCG: 125 TABLET ORAL at 11:20

## 2020-02-08 RX ADMIN — SACUBITRIL AND VALSARTAN 1 TABLET: 24; 26 TABLET, FILM COATED ORAL at 09:01

## 2020-02-08 RX ADMIN — SODIUM CHLORIDE, PRESERVATIVE FREE 10 ML: 5 INJECTION INTRAVENOUS at 09:03

## 2020-02-08 NOTE — NURSING NOTE
Dr. Grubbs wants patient to go to the coumadin clinic on Monday. Patient states PCP takes are of his INR. Appointment on Tuesday; PCP not in office on monday

## 2020-02-08 NOTE — PROGRESS NOTES
Cardiology Progress Note     LOS: 0 days   Patient Care Team:  Kira Cabrera Known as PCP - General    Subjective:      Chart reviewed. Patient seen and examined. Patient has been ambulating in the hallway without any symptoms of chest pain or shortness of breath.  Patient has been initiated on Entresto.  Patient blood pressure has been on the low side though patient has not complained of having any symptoms of dizziness.    Objective:  Temp:  [96.8 °F (36 °C)-97.7 °F (36.5 °C)] 97 °F (36.1 °C)  Heart Rate:  [] 96  Resp:  [18-20] 18  BP: ()/(59-77) 102/65    Intake/Output Summary (Last 24 hours) at 2/7/2020 2132  Last data filed at 2/7/2020 1835  Gross per 24 hour   Intake 1200 ml   Output 5425 ml   Net -4225 ml       Physical Exam:   General Appearance:    Alert, oriented, cooperative, in no acute distress.   Head:    Normocephalic, atraumatic, without obvious abnormality   Eyes:             SHRUTI. Lids and lashes normal, conjunctivae and sclerae normal, no icterus, no pallor.   Ears:    Ears appear intact with no abnormalities noted.   Throat:   Mucous membranes pink and moist.   Neck:  Supple, trachea midline, no carotid bruit, no organomegaly or JVD.   Lungs:    Clear to auscultation and percussion.  Respirations regular, even and unlabored. No wheezes, rales, or rhonchi.    Heart:    Regular rhythm and normal rate, normal S1 and S2, no      murmur, no gallop, no rub, no click.   Abdomen:    Soft, non-tender, non-distended, no guarding, no rebound tenderness. Normal bowel sounds in all four quadrants, no masses, liver and spleen nonpalpable.    Genitalia:    Deferred.   Extremities:   Moves all extremities well, no edema, no cyanosis, no       redness, no clubbing.   Pulses:   Pulses palpable and equal bilaterally.   Skin:   Moist and warm. No bleeding, bruising or rash.   Neurologic/Psychiatric:   Alert and oriented to person, place, and time.  Motor, power and tone in upper and lower extremities are  grossly intact.  No focal neurological deficits. Normal cognitive function. No psychomotor reaction or tangential thought. No depression, homicidal ideations and suicidal ideations.          Results Review:    Results from last 7 days   Lab Units 02/05/20  0648   SODIUM mmol/L 134*   POTASSIUM mmol/L 4.3   CHLORIDE mmol/L 98   CO2 mmol/L 24.0   BUN mg/dL 18   CREATININE mg/dL 1.02   CALCIUM mg/dL 9.0   BILIRUBIN mg/dL 0.7   ALK PHOS U/L 51   ALT (SGPT) U/L 135*   AST (SGOT) U/L 94*   GLUCOSE mg/dL 106*     Results from last 7 days   Lab Units 02/05/20  0648 02/04/20  0110   TROPONIN T ng/mL <0.010 <0.010         Results from last 7 days   Lab Units 02/05/20  0649   WBC 10*3/mm3 8.02   HEMOGLOBIN g/dL 12.8*   HEMATOCRIT % 39.0   PLATELETS 10*3/mm3 226     Results from last 7 days   Lab Units 02/07/20  0523 02/06/20  0507 02/05/20  0648   INR  1.82* 1.55* 1.68*                       ECHO:  Results for orders placed during the hospital encounter of 02/05/20   Adult Transthoracic Echo Complete With Contrast if Necessary Per Protocol    Narrative · Left atrial cavity size is moderately dilated.  · Mild mitral valve regurgitation is present  · Left ventricular systolic function is severely decreased.  · Mild to moderate tricuspid valve regurgitation is present.  · The following left ventricular wall segments are hypokinetic: mid   anterior, apical anterior, basal anterolateral, mid anterolateral, apical   lateral, basal inferolateral, mid inferolateral, apical inferior, mid   inferior, apical septal, basal inferoseptal, mid inferoseptal, apex   hypokinetic, mid anteroseptal, basal anterior, basal inferior and basal   inferoseptal.  · The left ventricular cavity is mildly dilated.          ECG 12 Lead    (Results Pending)        Medication Review:   Current Facility-Administered Medications   Medication Dose Route Frequency Provider Last Rate Last Dose   • albuterol (PROVENTIL) nebulizer solution 0.083% 2.5 mg/3mL  2.5 mg  Nebulization Q6H PRN David Waterman MD       • calcium carbonate (TUMS) chewable tablet 500 mg (200 mg elemental)  2 tablet Oral Daily David Waterman MD   2 tablet at 02/07/20 0834   • digoxin (LANOXIN) tablet 125 mcg  125 mcg Oral Daily Owen Perez MD   125 mcg at 02/07/20 1210   • furosemide (LASIX) injection 40 mg  40 mg Intravenous Q12H David Waterman MD   40 mg at 02/07/20 0833   • Magnesium Oxide tablet 400 mg  400 mg Oral BID Owen Perez MD   400 mg at 02/07/20 0834   • metoprolol tartrate (LOPRESSOR) half tablet 12.5 mg  12.5 mg Oral Q12H Owen Perez MD       • pantoprazole (PROTONIX) EC tablet 40 mg  40 mg Oral QAM David Waterman MD   40 mg at 02/07/20 0619   • Pharmacy to dose warfarin   Does not apply Continuous PRN David Waterman MD       • sacubitril-valsartan (ENTRESTO) 24-26 MG tablet 1 tablet  1 tablet Oral Q12H Owen Perez MD   1 tablet at 02/07/20 0421   • sodium chloride 0.9 % flush 10 mL  10 mL Intravenous PRN David Waterman MD       • sodium chloride 0.9 % flush 10 mL  10 mL Intravenous Q12H David Waterman MD   10 mL at 02/07/20 0835   • sodium chloride 0.9 % flush 10 mL  10 mL Intravenous PRN David Waterman MD       • spironolactone (ALDACTONE) half tablet 12.5 mg  12.5 mg Oral Daily Ricardo Grubbs MD   12.5 mg at 02/07/20 0834   • tiZANidine (ZANAFLEX) tablet 4 mg  4 mg Oral Nightly PRN David Waterman MD       • warfarin (COUMADIN) tablet 10 mg  10 mg Oral Nightly David Waterman MD   10 mg at 02/06/20 2049   • zolpidem (AMBIEN) tablet 10 mg  10 mg Oral Nightly PRN David Waterman MD           Assessment and Plan:      Acute on chronic HFrEF (heart failure with reduced ejection fraction) (CMS/HCC)  1.  Nonischemic cardiomyopathy with severe left ventricular systolic dysfunction.  Patient repeat transthoracic echocardiogram revealed severe left ventricular systolic dysfunction with echodensity in the left ventricle suggestive of  a thrombus.  Patient is on anticoagulation with Coumadin.  Patient has tolerated Entresto and digoxin.  Patient would be continued on the present dose of the metoprolol, Aldactone and Lasix.  Patient has been counseled to decrease her salt intake.  Patient has been referred to [facility] for evaluation for cardiac transplant.    2.  Atypical symptoms of chest pain.  Patient coronary angiogram done at [facility] did not reveal of any evidence of any obstructive epicardial coronary artery disease.  Patient at the present time has been reassured.    3.  Atrial septal defect with right-to-left shunting noted on the transthoracic echocardiogram.  Patient after being stabilized would need a closure device for the atrial septal defect.    4.  Status post LifeVest for nonischemic cardiomyopathy.  Patient has had episodes of nonsustained ventricular tachycardia.  Patient has not complained of having any recurrent symptoms of palpitation and patient telemetry monitor has not revealed of any evidence of any ventricular arrhythmia.    5.  Risk factor modification.  Patient has been counseled to quit smoking.    The above plan of management were discussed with the patient and the family especially the mother.            Owen Perez MD  02/07/20  9:32 PM      Time: Time spent on face-to-face interaction 20 minutes      Dictated utilizing Dragon dictation.

## 2020-02-08 NOTE — PROGRESS NOTES
[facility] Medicine Services  INPATIENT PROGRESS NOTE    Length of Stay: 1  Date of Admission: 2/5/2020  Primary Care Physician: Provider, No Known    Subjective   Chief Complaint: No new complaints.    HPI: Patient is seen for follow-up today.  He is a 37-year-old male with history of nonischemic cardiomyopathy (ejection fraction of about 10%), heart failure with decreased ejection fraction, anxiety and GERD who was admitted for acute on chronic systolic heart failure.  He is doing well, less symptomatic and voices no new complaints.  Trace pedal edema both legs has resolved.    Review of Systems   Constitutional: Negative for activity change, appetite change, chills, diaphoresis, fatigue and fever.   HENT: Negative for trouble swallowing and voice change.    Eyes: Negative for photophobia and visual disturbance.   Respiratory: Negative for cough, choking, chest tightness, shortness of breath, wheezing and stridor.    Cardiovascular: Negative for chest pain, palpitations and leg swelling.   Gastrointestinal: Negative for abdominal distention, abdominal pain, blood in stool, constipation, diarrhea, nausea and vomiting.   Endocrine: Negative for cold intolerance, heat intolerance, polydipsia, polyphagia and polyuria.   Genitourinary: Negative for decreased urine volume, difficulty urinating, dysuria, enuresis, flank pain, frequency, hematuria and urgency.   Musculoskeletal: Negative for arthralgias, gait problem, myalgias, neck pain and neck stiffness.   Skin: Negative for pallor, rash and wound.   Neurological: Negative for dizziness, tremors, seizures, syncope, facial asymmetry, speech difficulty, weakness, light-headedness, numbness and headaches.   Hematological: Does not bruise/bleed easily.   Psychiatric/Behavioral: Negative for agitation, behavioral problems and confusion.       Objective    Temp:  [96.1 °F (35.6 °C)-97.6 °F (36.4 °C)] 96.1 °F (35.6 °C)  Heart Rate:   [] 100  Resp:  [18] 18  BP: ()/(55-75) 108/75    Physical Exam   Constitutional: He is oriented to person, place, and time. He appears well-developed and well-nourished. No distress.   HENT:   Head: Normocephalic and atraumatic.   Eyes: Pupils are equal, round, and reactive to light. EOM are normal. No scleral icterus.   Neck: Normal range of motion. Neck supple. No JVD present. No thyromegaly present.   Cardiovascular: Normal rate, regular rhythm and normal heart sounds. Exam reveals no gallop and no friction rub.   No murmur heard.  Pulmonary/Chest: Effort normal and breath sounds normal. He has no wheezes. He has no rales. He exhibits no tenderness.   Abdominal: Soft. Bowel sounds are normal. He exhibits no distension and no mass. There is no tenderness. There is no rebound and no guarding.   Musculoskeletal: He exhibits no edema, tenderness or deformity.   Neurological: He is alert and oriented to person, place, and time. No cranial nerve deficit or sensory deficit. He exhibits normal muscle tone. Coordination normal.   Skin: Skin is warm and dry. No rash noted. He is not diaphoretic. No erythema. No pallor.   Psychiatric: He has a normal mood and affect. His behavior is normal. Judgment and thought content normal.   Nursing note and vitals reviewed.        Medication Review:    Current Facility-Administered Medications:   •  albuterol (PROVENTIL) nebulizer solution 0.083% 2.5 mg/3mL, 2.5 mg, Nebulization, Q6H PRN, David Waterman MD  •  calcium carbonate (TUMS) chewable tablet 500 mg (200 mg elemental), 2 tablet, Oral, Daily, David Waterman MD, 2 tablet at 02/08/20 0901  •  digoxin (LANOXIN) tablet 125 mcg, 125 mcg, Oral, Daily, Owen Perez MD, 125 mcg at 02/08/20 1120  •  furosemide (LASIX) injection 40 mg, 40 mg, Intravenous, Q12H, David Waterman MD, 40 mg at 02/08/20 0902  •  Magnesium Oxide tablet 400 mg, 400 mg, Oral, BID, Owen Perez MD, 400 mg at 02/08/20 0901  •   metoprolol tartrate (LOPRESSOR) half tablet 12.5 mg, 12.5 mg, Oral, Q12H, Owen Perez MD, 12.5 mg at 02/08/20 1120  •  pantoprazole (PROTONIX) EC tablet 40 mg, 40 mg, Oral, QAM, David Waterman MD, 40 mg at 02/08/20 0904  •  Pharmacy to dose warfarin, , Does not apply, Continuous PRN, David Waterman MD  •  sacubitril-valsartan (ENTRESTO) 24-26 MG tablet 1 tablet, 1 tablet, Oral, Q12H, Owen Perez MD, 1 tablet at 02/08/20 0901  •  sodium chloride 0.9 % flush 10 mL, 10 mL, Intravenous, PRN, David Waterman MD  •  sodium chloride 0.9 % flush 10 mL, 10 mL, Intravenous, Q12H, David Waterman MD, 10 mL at 02/08/20 0903  •  sodium chloride 0.9 % flush 10 mL, 10 mL, Intravenous, PRN, David Waterman MD  •  spironolactone (ALDACTONE) half tablet 12.5 mg, 12.5 mg, Oral, Daily, Ricardo Grubbs MD, 12.5 mg at 02/08/20 0901  •  tiZANidine (ZANAFLEX) tablet 4 mg, 4 mg, Oral, Nightly PRN, David Waterman MD  •  warfarin (COUMADIN) tablet 10 mg, 10 mg, Oral, Nightly, David Waterman MD, 10 mg at 02/07/20 2202  •  zolpidem (AMBIEN) tablet 10 mg, 10 mg, Oral, Nightly PRN, David Waterman MD, 10 mg at 02/07/20 2223    Results Review:  I have reviewed the labs, radiology results, and diagnostic studies.    Laboratory Data:   Results from last 7 days   Lab Units 02/08/20  0536 02/05/20  0648 02/04/20  0110   SODIUM mmol/L 141 134* 130*   POTASSIUM mmol/L 3.9 4.3 4.0   CHLORIDE mmol/L 101 98 95*   CO2 mmol/L 29.0 24.0 23.0   BUN mg/dL 12 18 14   CREATININE mg/dL 1.16 1.02 0.97   GLUCOSE mg/dL 87 106* 117*   CALCIUM mg/dL 9.0 9.0 8.8   BILIRUBIN mg/dL  --  0.7 1.1   ALK PHOS U/L  --  51 57   ALT (SGPT) U/L  --  135* 148*   AST (SGOT) U/L  --  94* 134*   ANION GAP mmol/L 11.0 12.0 12.0     Estimated Creatinine Clearance: 103.8 mL/min (by C-G formula based on SCr of 1.16 mg/dL).          Results from last 7 days   Lab Units 02/08/20  0536 02/05/20  0649 02/04/20  0110   WBC 10*3/mm3 6.69 8.02 6.94      HEMOGLOBIN g/dL 13.3 12.8* 12.5*   HEMATOCRIT % 40.7 39.0 38.6   PLATELETS 10*3/mm3 211 226 235     Results from last 7 days   Lab Units 02/08/20  0536 02/07/20  0523 02/06/20  0507 02/05/20  0648   INR  2.01* 1.82* 1.55* 1.68*       Culture Data:   No results found for: BLOODCX  No results found for: URINECX  No results found for: RESPCX  No results found for: WOUNDCX  No results found for: STOOLCX  No components found for: BODYFLD    Radiology Data:   Imaging Results (Last 24 Hours)     ** No results found for the last 24 hours. **          I have reviewed the patient's current medications.     Assessment/Plan     Hospital Problem List:  Active Problems:  Nonischemic cardiomyopathy complicated by acute on chronic HFrEF (heart failure with reduced ejection fraction): Improving.  Continue diuretic therapy, beta-blocker with strict I's and O's, daily weights, salt and fluid restriction with digoxin, Entresto and Aldactone.  Cardiac transplantation has been discussed with the patient patient has been referred to [facility]. Dr. Perez is following.  Echocardiogram done 2/6/2020 showed:  Left Ventricle Left ventricular systolic function is severely decreased. Estimated EF appears to be in the range of less than 20%. There is left ventricular global hypokinesis noted.   The left ventricular cavity is mildly dilated. Left ventricular diastolic function is normal. The presence of a left ventricular thrombus cannot be excluded. Spontaneous contrast consistent with low flow rate is noted in the left ventricle.   Right Ventricle Normal right ventricular cavity size noted. No evidence of a right ventricular thrombus present. No evidence of a right ventricular mass present.   Left Atrium Left atrial cavity size is moderately dilated. No evidence of a left atrial thrombus present. No evidence of a left atrial mass present. There is no spontaneous echo contrast present. Interatrial septal defect present with right to  left shunting indicated by color flow Doppler and saline contrast. Saline test results are positive for right to left atrial level shunt.   Right Atrium Normal right atrial size noted. No evidence of a right atrial thrombus present. No evidence of a right atrial mass present.   Aortic Valve The aortic valve is grossly normal in structure. The valve appears trileaflet. No significant aortic valve regurgitation is present.   Mitral Valve The mitral valve is grossly normal in structure. Mild mitral valve regurgitation is present.   Tricuspid Valve The tricuspid valve is grossly normal. Mild to moderate tricuspid valve regurgitation is present.   Greater Vessels No dilation of the aortic root is present.   Pericardium The pericardium is normal. There is no evidence of pericardial effusion.     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    History of left ventricular thrombus: Continue anticoagulation with Coumadin.  INR is 2.01. Continue to monitor.    History of nonsustained V. tach: Patient remains stable and asymptomatic.  He has been started on magnesium oxide.  Cardiologist is following.    History of ADHD: Continue Vyvanse.     Continue PPI for GERD/DVT prophylaxis with warfarin.        Discharge Planning: In progress.    Ricardo Grubbs MD   02/08/20   12:06 PM

## 2020-02-08 NOTE — PROGRESS NOTES
Cardiology Progress Note     LOS: 1 day   Patient Care Team:  Provider, No Known as PCP - General    Subjective:      Chart reviewed. Patient seen and examined. Patient denies any chest pain, shortness of breath, or palpitation.  Patient has been ambulating in the hallway without any symptoms of chest pain or shortness of breath.  Patient heart rate is elevated.  Patient is currently on metoprolol.  Patient blood pressure is on the low side.  Patient telemetry monitor has not revealed of any evidence of any arrhythmia.  Patient at the present time has been recommended to optimize medical management and further evaluation for cardiac transplant at [facility].       Objective:  Temp:  [96.1 °F (35.6 °C)-97.6 °F (36.4 °C)] 96.1 °F (35.6 °C)  Heart Rate:  [] 100  Resp:  [18] 18  BP: ()/(55-75) 108/75    Intake/Output Summary (Last 24 hours) at 2/8/2020 1426  Last data filed at 2/8/2020 1300  Gross per 24 hour   Intake 960 ml   Output 3000 ml   Net -2040 ml       Physical Exam:   General Appearance:    Alert, oriented, cooperative, in no acute distress.   Head:    Normocephalic, atraumatic, without obvious abnormality   Eyes:             SHRUTI. Lids and lashes normal, conjunctivae and sclerae normal, no icterus, no pallor.   Ears:    Ears appear intact with no abnormalities noted.   Throat:   Mucous membranes pink and moist.   Neck:  Supple, trachea midline, no carotid bruit, no organomegaly or JVD.   Lungs:    Clear to auscultation and percussion.  Respirations regular, even and unlabored. No wheezes, rales, or rhonchi.    Heart:    Regular rhythm and normal rate, normal S1 and S2, no      murmur, no gallop, no rub, no click.   Abdomen:    Soft, non-tender, non-distended, no guarding, no rebound tenderness. Normal bowel sounds in all four quadrants, no masses, liver and spleen nonpalpable.    Genitalia:    Deferred.   Extremities:   Moves all extremities well, no edema, no cyanosis, no        redness, no clubbing.   Pulses:   Pulses palpable and equal bilaterally.   Skin:   Moist and warm. No bleeding, bruising or rash.   Neurologic/Psychiatric:   Alert and oriented to person, place, and time.  Motor, power and tone in upper and lower extremities are grossly intact.  No focal neurological deficits. Normal cognitive function. No psychomotor reaction or tangential thought. No depression, homicidal ideations and suicidal ideations.          Results Review:    Results from last 7 days   Lab Units 02/08/20  0536 02/05/20  0648   SODIUM mmol/L 141 134*   POTASSIUM mmol/L 3.9 4.3   CHLORIDE mmol/L 101 98   CO2 mmol/L 29.0 24.0   BUN mg/dL 12 18   CREATININE mg/dL 1.16 1.02   CALCIUM mg/dL 9.0 9.0   BILIRUBIN mg/dL  --  0.7   ALK PHOS U/L  --  51   ALT (SGPT) U/L  --  135*   AST (SGOT) U/L  --  94*   GLUCOSE mg/dL 87 106*     Results from last 7 days   Lab Units 02/05/20  0648 02/04/20  0110   TROPONIN T ng/mL <0.010 <0.010         Results from last 7 days   Lab Units 02/08/20  0536   WBC 10*3/mm3 6.69   HEMOGLOBIN g/dL 13.3   HEMATOCRIT % 40.7   PLATELETS 10*3/mm3 211     Results from last 7 days   Lab Units 02/08/20  0536 02/07/20  0523 02/06/20  0507   INR  2.01* 1.82* 1.55*                       ECHO:  Results for orders placed during the hospital encounter of 02/05/20   Adult Transthoracic Echo Complete With Contrast if Necessary Per Protocol    Narrative · Left atrial cavity size is moderately dilated.  · Mild mitral valve regurgitation is present  · Left ventricular systolic function is severely decreased.  · Mild to moderate tricuspid valve regurgitation is present.  · The following left ventricular wall segments are hypokinetic: mid   anterior, apical anterior, basal anterolateral, mid anterolateral, apical   lateral, basal inferolateral, mid inferolateral, apical inferior, mid   inferior, apical septal, basal inferoseptal, mid inferoseptal, apex   hypokinetic, mid anteroseptal, basal anterior, basal  inferior and basal   inferoseptal.  · The left ventricular cavity is mildly dilated.          ECG 12 Lead   Preliminary Result   Test Reason : Chest pain   Blood Pressure : **/** mmHG   Vent. Rate : 089 BPM     Atrial Rate : 089 BPM      P-R Int : 184 ms          QRS Dur : 108 ms       QT Int : 364 ms       P-R-T Axes : 056 -14 134 degrees      QTc Int : 442 ms      Normal sinus rhythm   Possible Left atrial enlargement   Left ventricular hypertrophy   T wave abnormality, consider lateral ischemia   Abnormal ECG   When compared with ECG of 04-FEB-2020 00:35,   Electronic demand pacing is no longer present   premature ventricular complexes are no longer present      Referred By:             Confirmed By:       ECG 12 Lead    (Results Pending)   ECG 12 Lead    (Results Pending)        Medication Review:   Current Facility-Administered Medications   Medication Dose Route Frequency Provider Last Rate Last Dose   • albuterol (PROVENTIL) nebulizer solution 0.083% 2.5 mg/3mL  2.5 mg Nebulization Q6H PRN David Waterman MD       • calcium carbonate (TUMS) chewable tablet 500 mg (200 mg elemental)  2 tablet Oral Daily David Waterman MD   2 tablet at 02/08/20 0901   • [START ON 2/9/2020] digoxin (LANOXIN) tablet 250 mcg  250 mcg Oral Daily Owne Perez MD       • furosemide (LASIX) injection 40 mg  40 mg Intravenous Q12H David Waterman MD   40 mg at 02/08/20 0902   • Magnesium Oxide tablet 400 mg  400 mg Oral BID Owen Perez MD   400 mg at 02/08/20 0901   • metoprolol tartrate (LOPRESSOR) half tablet 12.5 mg  12.5 mg Oral Q12H Owen Perez MD   12.5 mg at 02/08/20 1120   • pantoprazole (PROTONIX) EC tablet 40 mg  40 mg Oral QAM David Waterman MD   40 mg at 02/08/20 0904   • Pharmacy to dose warfarin   Does not apply Continuous PRN David Waterman MD       • sacubitril-valsartan (ENTRESTO) 24-26 MG tablet 1 tablet  1 tablet Oral Q12H Owen Perez MD   1 tablet at 02/08/20 0901   • sodium  chloride 0.9 % flush 10 mL  10 mL Intravenous PRN David Waterman MD       • sodium chloride 0.9 % flush 10 mL  10 mL Intravenous Q12H David Waterman MD   10 mL at 02/08/20 0903   • sodium chloride 0.9 % flush 10 mL  10 mL Intravenous PRN David Waterman MD       • spironolactone (ALDACTONE) half tablet 12.5 mg  12.5 mg Oral Daily Ricardo Grubbs MD   12.5 mg at 02/08/20 0901   • tiZANidine (ZANAFLEX) tablet 4 mg  4 mg Oral Nightly PRN David Waterman MD       • warfarin (COUMADIN) tablet 10 mg  10 mg Oral Nightly David Waterman MD   10 mg at 02/07/20 2202   • zolpidem (AMBIEN) tablet 10 mg  10 mg Oral Nightly PRN David Waterman MD   10 mg at 02/07/20 2223       Assessment and Plan:      Acute on chronic HFrEF (heart failure with reduced ejection fraction) (CMS/[facility])  1.  Nonischemic cardiomyopathy.  Patient has been recommended to optimize medical management for the systolic heart failure.  Patient would be continued on the present dose of the metoprolol Lasix and Entresto.  Patient digoxin dose would be increased 0.25 mg daily.  Patient clinically on examination is not in congestive heart failure.  Patient would be continued on medical management at the present time.  Patient is stable from the cardiac standpoint to be discharged for outpatient follow-up.  Patient has been counseled to decrease her salt intake.  [facility].  Patient and family was informed.  Patient already has a LifeVest in place.    2.  LV thrombus.  Patient is on anticoagulation with Coumadin with a therapeutic PT/INR.  Patient had not complained of having any episodes of any bleeding diastasis of hemoptysis hematuria bright red blood per rectum.    3.  Atrial septal defect with right-to-left shunting.  Patient would be evaluated for a closure device which would be done on outpatient basis.  Patient previous evaluation was at  [facility] with Dr. Girard.    4.  Attention deficit hyperactivity disorder.  Patient has been followed by the primary care physician.    The above plan of management were discussed with the patient and the family at length.  Patient has been informed that he is stable from the cardiac standpoint to be discharged and to continue with the LifeVest and to follow-up in the office as scheduled.            Owen Perez MD  02/08/20  2:26 PM      Time: Time spent on face-to-face interaction 30 minutes    Dictated utilizing Dragon dictation.

## 2020-02-08 NOTE — DISCHARGE SUMMARY
[facility] Medicine Services  DISCHARGE SUMMARY       Date of Admission: 2/5/2020  Date of Discharge:  2/8/2020  Primary Care Physician: Provider, No Known    Presenting Problem/History of Present Illness:  Acute on chronic HFrEF (heart failure with reduced ejection fraction) (CMS/[facility]) [I50.23]  Acute on chronic HFrEF (heart failure with reduced ejection fraction) (CMS/[facility]) [I50.23]  Acute on chronic HFrEF (heart failure with reduced ejection fraction) (CMS/[facility]) [I50.23]   Patient is a 40-year-old  male with past medical history of severe congestive heart failure requiring a LifeVest.  He presented emergency department with complaints of worsening lower extremity edema and pain.  He has been increasingly short of air reports that he is unable to lie flat.  He states that he thinks he has obstructive sleep apnea because he wakes up suddenly frequently in the night not being able to breathe.  He has been undergoing cardiac rehab, but was unable to continue with that due to his worsening respiratory status.    Final Discharge Diagnoses:  Active Hospital Problems    Diagnosis   • Acute on chronic HFrEF (heart failure with reduced ejection fraction) (CMS/[facility])       Consults:   Consults     Date and Time Order Name Status Description    2/5/2020 1821 Inpatient Cardiology Consult Completed     2/5/2020 1040 Hospitalist (on-call MD unless specified)            Procedures Performed: None.                Pertinent Test Results:   Lab Results (last 7 days)     Procedure Component Value Units Date/Time    Basic Metabolic Panel [[id number]]  (Normal) Collected:  02/08/20 0536    Specimen:  Blood Updated:  02/08/20 0637     Glucose 87 mg/dL      BUN 12 mg/dL      Creatinine 1.16 mg/dL      Sodium 141 mmol/L      Potassium 3.9 mmol/L      Chloride 101 mmol/L      CO2 29.0 mmol/L      Calcium 9.0 mg/dL      eGFR Non African Amer 70 mL/min/1.73      BUN/Creatinine Ratio 10.3      Anion Gap 11.0 mmol/L     Narrative:       GFR Normal >60  Chronic Kidney Disease <60  Kidney Failure <15      Protime-INR [[id number]]  (Abnormal) Collected:  02/08/20 0536    Specimen:  Blood Updated:  02/08/20 0629     Protime 22.5 Seconds      INR 2.01    Narrative:       Therapeutic range for most indications is 2.0-3.0 INR,  or 2.5-3.5 for mechanical heart valves.    CBC & Differential [[id number]] Collected:  02/08/20 0536    Specimen:  Blood Updated:  02/08/20 0613    Narrative:       The following orders were created for panel order CBC & Differential.  Procedure                               Abnormality         Status                     ---------                               -----------         ------                     CBC Auto Differential[[id number]]        Normal              Final result                 Please view results for these tests on the individual orders.    CBC Auto Differential [[id number]]  (Normal) Collected:  02/08/20 0536    Specimen:  Blood Updated:  02/08/20 0613     WBC 6.69 10*3/mm3      RBC 4.60 10*6/mm3      Hemoglobin 13.3 g/dL      Hematocrit 40.7 %      MCV 88.5 fL      MCH 28.9 pg      MCHC 32.7 g/dL      RDW 14.0 %      RDW-SD 44.9 fl      MPV 10.8 fL      Platelets 211 10*3/mm3      Neutrophil % 46.3 %      Lymphocyte % 38.1 %      Monocyte % 10.5 %      Eosinophil % 4.0 %      Basophil % 1.0 %      Immature Grans % 0.1 %      Neutrophils, Absolute 3.09 10*3/mm3      Lymphocytes, Absolute 2.55 10*3/mm3      Monocytes, Absolute 0.70 10*3/mm3      Eosinophils, Absolute 0.27 10*3/mm3      Basophils, Absolute 0.07 10*3/mm3      Immature Grans, Absolute 0.01 10*3/mm3      nRBC 0.0 /100 WBC     Protime-INR [[id number]]  (Abnormal) Collected:  02/07/20 0523    Specimen:  Blood Updated:  02/07/20 0651     Protime 20.8 Seconds      INR 1.82    Narrative:       Therapeutic range for most indications is 2.0-3.0 INR,  or 2.5-3.5 for mechanical heart valves.    Extra Tubes [[id number]]  Collected:  02/07/20 0523    Specimen:  Blood, Venous Line Updated:  02/07/20 0631    Narrative:       The following orders were created for panel order Extra Tubes.  Procedure                               Abnormality         Status                     ---------                               -----------         ------                     Lavender Top[[id number]]                                     Final result               Green Top (Gel)[[id number]]                                  Final result                 Please view results for these tests on the individual orders.    Lavender Top [[id number]] Collected:  02/07/20 0523    Specimen:  Blood Updated:  02/07/20 0631     Extra Tube hold for add-on     Comment: Auto resulted       Green Top (Gel) [[id number]] Collected:  02/07/20 0523    Specimen:  Blood Updated:  02/07/20 0631     Extra Tube Hold for add-ons.     Comment: Auto resulted.       Protime-INR [[id number]]  (Abnormal) Collected:  02/06/20 0507    Specimen:  Blood Updated:  02/06/20 0550     Protime 18.4 Seconds      INR 1.55    Narrative:       Therapeutic range for most indications is 2.0-3.0 INR,  or 2.5-3.5 for mechanical heart valves.    Rousseau Draw [[id number]] Collected:  02/05/20 0648    Specimen:  Blood Updated:  02/05/20 0801    Narrative:       The following orders were created for panel order Rousseau Draw.  Procedure                               Abnormality         Status                     ---------                               -----------         ------                     Light Blue Top[[id number]]                                   Final result               Green Top (Gel)[[id number]]                                  Final result               Lavender Top[[id number]]                                     Final result               Gold Top - SST[[id number]]                                   Final result                 Please view results for these tests on the individual orders.    Light Blue  Top [[id number]] Collected:  02/05/20 0648    Specimen:  Blood Updated:  02/05/20 0801     Extra Tube hold for add-on     Comment: Auto resulted       Green Top (Gel) [[id number]] Collected:  02/05/20 0648    Specimen:  Blood Updated:  02/05/20 0801     Extra Tube Hold for add-ons.     Comment: Auto resulted.       Lavender Top [[id number]] Collected:  02/05/20 0649    Specimen:  Blood Updated:  02/05/20 0801     Extra Tube hold for add-on     Comment: Auto resulted       Gold Top - SST [[id number]] Collected:  02/05/20 0648    Specimen:  Blood Updated:  02/05/20 0801     Extra Tube Hold for add-ons.     Comment: Auto resulted.       Protime-INR [[id number]]  (Abnormal) Collected:  02/05/20 0648    Specimen:  Blood Updated:  02/05/20 0756     Protime 19.6 Seconds      INR 1.68    Narrative:       Therapeutic range for most indications is 2.0-3.0 INR,  or 2.5-3.5 for mechanical heart valves.    Comprehensive Metabolic Panel [[id number]]  (Abnormal) Collected:  02/05/20 0648    Specimen:  Blood Updated:  02/05/20 0715     Glucose 106 mg/dL      BUN 18 mg/dL      Creatinine 1.02 mg/dL      Sodium 134 mmol/L      Potassium 4.3 mmol/L      Chloride 98 mmol/L      CO2 24.0 mmol/L      Calcium 9.0 mg/dL      Total Protein 5.8 g/dL      Albumin 3.60 g/dL      ALT (SGPT) 135 U/L      AST (SGOT) 94 U/L      Comment: Specimen hemolyzed.  Results may be affected.        Alkaline Phosphatase 51 U/L      Total Bilirubin 0.7 mg/dL      eGFR Non African Amer 82 mL/min/1.73      Globulin 2.2 gm/dL      A/G Ratio 1.6 g/dL      BUN/Creatinine Ratio 17.6     Anion Gap 12.0 mmol/L     Narrative:       GFR Normal >60  Chronic Kidney Disease <60  Kidney Failure <15      Troponin [[id number]]  (Normal) Collected:  02/05/20 0648    Specimen:  Blood Updated:  02/05/20 0712     Troponin T <0.010 ng/mL     Narrative:       Troponin T Reference Range:  <= 0.03 ng/mL-   Negative for AMI  >0.03 ng/mL-     Abnormal for myocardial necrosis.  Clinicians  would have to utilize clinical acumen, EKG, Troponin and serial changes to determine if it is an Acute Myocardial Infarction or myocardial injury due to an underlying chronic condition.       Results may be falsely decreased if patient taking Biotin.      BNP [[id number]]  (Abnormal) Collected:  02/05/20 0648    Specimen:  Blood Updated:  02/05/20 0710     proBNP 3,219.0 pg/mL     Narrative:       Among patients with dyspnea, NT-proBNP is highly sensitive for the detection of acute congestive heart failure. In addition NT-proBNP of <300 pg/ml effectively rules out acute congestive heart failure with 99% negative predictive value.    Results may be falsely decreased if patient taking Biotin.      CBC & Differential [[id number]] Collected:  02/05/20 0649    Specimen:  Blood Updated:  02/05/20 0653    Narrative:       The following orders were created for panel order CBC & Differential.  Procedure                               Abnormality         Status                     ---------                               -----------         ------                     CBC Auto Differential[[id number]]        Abnormal            Final result                 Please view results for these tests on the individual orders.    CBC Auto Differential [[id number]]  (Abnormal) Collected:  02/05/20 0649    Specimen:  Blood Updated:  02/05/20 0653     WBC 8.02 10*3/mm3      RBC 4.46 10*6/mm3      Hemoglobin 12.8 g/dL      Hematocrit 39.0 %      MCV 87.4 fL      MCH 28.7 pg      MCHC 32.8 g/dL      RDW 13.9 %      RDW-SD 44.7 fl      MPV 10.6 fL      Platelets 226 10*3/mm3      Neutrophil % 58.2 %      Lymphocyte % 30.9 %      Monocyte % 8.7 %      Eosinophil % 1.5 %      Basophil % 0.5 %      Immature Grans % 0.2 %      Neutrophils, Absolute 4.66 10*3/mm3      Lymphocytes, Absolute 2.48 10*3/mm3      Monocytes, Absolute 0.70 10*3/mm3      Eosinophils, Absolute 0.12 10*3/mm3      Basophils, Absolute 0.04 10*3/mm3      Immature Grans, Absolute  0.02 10*3/mm3      nRBC 0.0 /100 WBC         Imaging Results (Last 7 Days)     Procedure Component Value Units Date/Time    US Venous Doppler Lower Extremity Bilateral (duplex) [[id number]] Collected:  02/05/20 0853     Updated:  02/05/20 0941    Narrative:         [facility] [place]    Patient Name: SANYA BULLARD    ATTENDING: CARLOS BOWSER     REFERRING: ELFEGO LYN    ORDERING: ELFEGO LYN    -----------------------    PROCEDURE: Duplex ultrasound beadiness examination bilateral  lower extremities    DATE OF EXAM: 2/5/2020    HISTORY: Leg edema, left greater than right    Ultrasound grayscale imaging along with color flow Doppler  evaluation of the major veins in both lower extremities was  performed.     FINDINGS:    Imaging shows a normal appearance with good compressibility and  normal wave augmentation within the common femoral veins, femoral  veins, and the popliteal veins bilaterally. Imaging below both  knees is unremarkable. Normal venous flow is seen with Doppler  imaging and there are no filling defects or internal echoes to  suggest deep venous thrombosis.    There is patency with satisfactory flow demonstrated within the  greater saphenous veins bilaterally.       Impression:       Unremarkable duplex venous Doppler examination of  bilateral lower extremities. No evidence of deep venous  thrombosis.        Electronically signed by:  Daimen Bravo MD  2/5/2020 9:40 AM CST  Workstation: [id number]    XR Chest 2 View [[id number]] Collected:  02/05/20 0707     Updated:  02/05/20 0755    Narrative:           PROCEDURE: Chest PA and lateral    REASON FOR EXAM: SOA Triage Protocol    FINDINGS: Comparison study dated February 4, 2020. Patient has  external vest chest monitoring system in place. Cardiac and  pulmonary vasculature are normal . Lungs are clear. Pleural  spaces are normal . No acute osseous abnormality.      Impression:       1.  No acute cardiopulmonary  abnormality.    Electronically signed by:  Bijan Kraus MD  2/5/2020 7:53 AM CST  Workstation: [id number]            Chief Complaint on Day of Discharge: None    Hospital Course:     Patient was admitted for nonischemic cardiomyopathy complicated by acute on chronic HFrEF (heart failure with reduced ejection fraction) and started on diuretic therapy, beta-blocker with strict I's and O's, daily weights, salt and fluid restriction with digoxin, Entresto and Aldactone.  He was seen by  and Cardiac transplantation was recommended for him. He has been referred to [facility].   Echocardiogram done 2/6/2020 showed:  Left Ventricle Left ventricular systolic function is severely decreased. Estimated EF appears to be in the range of less than 20%. There is left ventricular global hypokinesis noted.   The left ventricular cavity is mildly dilated. Left ventricular diastolic function is normal. The presence of a left ventricular thrombus cannot be excluded. Spontaneous contrast consistent with low flow rate is noted in the left ventricle.   Right Ventricle Normal right ventricular cavity size noted. No evidence of a right ventricular thrombus present. No evidence of a right ventricular mass present.   Left Atrium Left atrial cavity size is moderately dilated. No evidence of a left atrial thrombus present. No evidence of a left atrial mass present. There is no spontaneous echo contrast present. Interatrial septal defect present with right to left shunting indicated by color flow Doppler and saline contrast. Saline test results are positive for right to left atrial level shunt.   Right Atrium Normal right atrial size noted. No evidence of a right atrial thrombus present. No evidence of a right atrial mass present.   Aortic Valve The aortic valve is grossly normal in structure. The valve appears trileaflet. No significant aortic valve regurgitation is present.   Mitral Valve The mitral valve is grossly normal in  "structure. Mild mitral valve regurgitation is present.   Tricuspid Valve The tricuspid valve is grossly normal. Mild to moderate tricuspid valve regurgitation is present.   Greater Vessels No dilation of the aortic root is present.   Pericardium The pericardium is normal. There is no evidence of pericardial effusion.      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  This will be arranged by Dr. Perez as outpatient.     History of left ventricular thrombus: He was continued on  anticoagulation with Coumadin.  INR is 2.01.   Continue follow-up with the Coumadin clinic.       History of ADHD: He was continued on Vyvanse.                           Condition on Discharge: Stable and improved.    Physical Exam on Discharge:  /75   Pulse 100   Temp 96.1 °F (35.6 °C) (Oral)   Resp 18   Ht 180.3 cm (71\")   Wt 85 kg (187 lb 6.4 oz)   SpO2 98%   BMI 26.14 kg/m²   Physical Exam   Constitutional: He is oriented to person, place, and time. He appears well-developed and well-nourished. No distress.   HENT:   Head: Normocephalic and atraumatic.   Eyes: Pupils are equal, round, and reactive to light. EOM are normal. No scleral icterus.   Neck: Normal range of motion. Neck supple. No JVD present. No thyromegaly present.   Cardiovascular: Normal rate, regular rhythm and normal heart sounds. Exam reveals no gallop and no friction rub.   No murmur heard.  Pulmonary/Chest: Effort normal and breath sounds normal. He has no wheezes. He has no rales. He exhibits no tenderness.   Abdominal: Soft. Bowel sounds are normal. He exhibits no distension and no mass. There is no tenderness. There is no rebound and no guarding.   Musculoskeletal: He exhibits no edema, tenderness or deformity.   Neurological: He is alert and oriented to person, place, and time. No cranial nerve deficit or sensory deficit. He exhibits normal muscle tone. Coordination normal.   Skin: Skin is warm and dry. No rash " noted. He is not diaphoretic. No erythema. No pallor.   Psychiatric: He has a normal mood and affect. His behavior is normal. Judgment and thought content normal.   Nursing note and vitals reviewed.        Discharge Disposition:  Home or Self Care    Discharge Medications:     Discharge Medications      New Medications      Instructions Start Date   digoxin 250 MCG tablet  Commonly known as:  LANOXIN   250 mcg, Oral, Daily Digoxin   Start Date:  February 9, 2020     metoprolol tartrate 25 MG tablet  Commonly known as:  LOPRESSOR   12.5 mg, Oral, Every 12 Hours Scheduled      sacubitril-valsartan 24-26 MG tablet  Commonly known as:  ENTRESTO   1 tablet, Oral, Every 12 Hours Scheduled      spironolactone 25 MG tablet  Commonly known as:  ALDACTONE   12.5 mg, Oral, Daily   Start Date:  February 9, 2020        Changes to Medications      Instructions Start Date   furosemide 40 MG tablet  Commonly known as:  LASIX  What changed:    · medication strength  · how much to take   40 mg, Oral, 2 Times Daily      Magnesium Oxide 400 (240 Mg) MG tablet  What changed:    · medication strength  · when to take this   400 mg, Oral, 2 Times Daily         Continue These Medications      Instructions Start Date   albuterol sulfate  (90 Base) MCG/ACT inhaler  Commonly known as:  PROVENTIL HFA;VENTOLIN HFA;PROAIR HFA   2 puffs, Inhalation, Every 4 Hours PRN      AMBIEN 10 MG tablet  Generic drug:  zolpidem   10 mg, Oral, Nightly PRN      Calcium Carbonate Antacid 1250 MG/5ML   Oral      omeprazole 20 MG capsule  Commonly known as:  priLOSEC   20 mg, Oral, Daily      vitamin D 1.25 MG (83564 UT) capsule capsule  Commonly known as:  ERGOCALCIFEROL   50,000 Units, Oral, Every 7 Days      VYVANSE 70 MG capsule  Generic drug:  lisdexamfetamine   70 mg, Oral, Every Morning, Pt takes 1/2 in the morning and 1/2 in the afternoon      warfarin 7.5 MG tablet  Commonly known as:  COUMADIN   7.5 mg, Oral, Nightly      ZANAFLEX 4 MG  tablet  Generic drug:  tiZANidine   4 mg, Oral, Nightly PRN         Stop These Medications    metoprolol succinate XL 25 MG 24 hr tablet  Commonly known as:  TOPROL-XL            Discharge Diet: Cardiac with salt and fluid restriction.    Activity at Discharge: As tolerated.    Discharge Care Plan/Instructions: Patient has been advised to take his medications as prescribed and to return to emergency room in the event of any worsening symptoms.  He has also been advised on the need to keep wearing his LifeVest.    Follow-up Appointments:   Follow-up with primary care provider in 1 week and with Dr. Perez as outpatient.  Follow-up Coumadin clinic on Monday.  Test Results Pending at Discharge:     Ricardo Grubbs MD  02/08/20  2:37 PM    Time: 35 minutes.

## 2020-02-09 ENCOUNTER — READMISSION MANAGEMENT (OUTPATIENT)
Dept: CALL CENTER | Facility: HOSPITAL | Age: 40
End: 2020-02-09

## 2020-02-09 NOTE — OUTREACH NOTE
Prep Survey      Responses   Facility patient discharged from?  [facility]   Is patient eligible?  Yes   Discharge diagnosis  A/C HFpEF   Does the patient have one of the following disease processes/diagnoses(primary or secondary)?  CHF   Does the patient have Home health ordered?  No   Is there a DME ordered?  No   Comments regarding appointments  Office will call   Prep survey completed?  Yes          Edwige Garibay RN

## 2020-02-10 ENCOUNTER — NURSE TRIAGE (OUTPATIENT)
Dept: CALL CENTER | Facility: HOSPITAL | Age: 40
End: 2020-02-10

## 2020-02-10 ENCOUNTER — READMISSION MANAGEMENT (OUTPATIENT)
Dept: CALL CENTER | Facility: HOSPITAL | Age: 40
End: 2020-02-10

## 2020-02-10 NOTE — TELEPHONE ENCOUNTER
"Patient unable to get his Sacubitril-Valsartan,  Needing an authorization for his insurance. And wanting the medication sen to [facility] in [place] instead of the [facility] on [address] in [facility]    Reason for Disposition  • [1] Request for URGENT new prescription or refill of \"essential\" medication (i.e., likelihood of harm to patient if not taken) AND [2] triager unable to fill per unit policy    Additional Information  • Negative: Drug overdose and nurse unable to answer question  • Negative: Caller requesting information not related to medicine  • Negative: Caller requesting a prescription for Strep throat and has a positive culture result  • Negative: Rash while taking a medication or within 3 days of stopping it  • Negative: Immunization reaction suspected  • Negative: [1] Asthma and [2] having symptoms of asthma (cough, wheezing, etc)  • Negative: MORE THAN A DOUBLE DOSE of a prescription or over-the-counter (OTC) drug  • Negative: [1] DOUBLE DOSE (an extra dose or lesser amount) of over-the-counter (OTC) drug AND [2] any symptoms (e.g., dizziness, nausea, pain, sleepiness)  • Negative: [1] DOUBLE DOSE (an extra dose or lesser amount) of prescription drug AND [2] any symptoms (e.g., dizziness, nausea, pain, sleepiness)  • Negative: Took another person's prescription drug  • Negative: [1] DOUBLE DOSE (an extra dose or lesser amount) of prescription drug AND [2] NO symptoms (Exception: a double dose of antibiotics)  • Negative: Diabetes drug error or overdose (e.g., insulin or extra dose)    Answer Assessment - Initial Assessment Questions  1. SYMPTOMS: \"Do you have any symptoms?\"      CHF  2. SEVERITY: If symptoms are present, ask \"Are they mild, moderate or severe?\"   Severe    Protocols used: MEDICATION QUESTION CALL-ADULT-      "

## 2020-02-10 NOTE — OUTREACH NOTE
CHF Week 1 Survey      Responses   Facility patient discharged from?  [facility]   Does the patient have one of the following disease processes/diagnoses(primary or secondary)?  CHF   Is there a successful TCM telephone encounter documented?  No   CHF Week 1 attempt successful?  Yes   Call start time  1455   Call end time  1505   Discharge diagnosis  A/C Heart failure   Is patient permission given to speak with other caregiver?  No   Meds reviewed with patient/caregiver?  Yes   Is the patient having any side effects they believe may be caused by any medication additions or changes?  No   Does the patient have all medications ordered at discharge?  No [Patient states that he didn't get his entresto. ]   What is keeping the patient from filling the prescriptions?  Pre-authorization in progress   Nursing Interventions  Nurse provided patient education, Nurse advised patient to call provider   Prescription comments  Patient states that he has already contacted provider and waiting for prior auth to be approved.    Is the patient taking all medications as directed (includes completed medication regime)?  No   What is preventing the patient from taking all medications as directed?  Other   Medication comments  Patient states taking all meds except entresto that he has not gotten yet.    Does the patient have a primary care provider?   Yes   Does the patient have an appointment with their PCP within 7 days of discharge?  Yes   Has the patient kept scheduled appointments due by today?  N/A   Has home health visited the patient within 72 hours of discharge?  N/A   Psychosocial issues?  No   Did the patient receive a copy of their discharge instructions?  Yes   Nursing interventions  Reviewed instructions with patient   What is the patient's perception of their health status since discharge?  Improving   Nursing interventions  Nurse provided patient education   Is the patient weighing daily?  Yes   Does the patient have  scales?  Yes   Daily weight interventions  Education provided on importance of daily weight   Is the patient able to teach back Heart Failure diet management?  Yes   Is the patient able to teach back signs and symptoms of worsening condition? (i.e. weight gain, shortness of air, etc.)  Yes   Is the patient/caregiver able to teach back the hierarchy of who to call/visit for symptoms/problems? PCP, Specialist, Home health nurse, Urgent Care, ED, 911  Yes    CHF Week 1 call completed?  Yes          Edwige Ladd RN

## 2020-02-10 NOTE — OUTREACH NOTE
Case Mgmt/Unit Call Center Follow-up      Responses   [facility] Call Center Tracking Reason?  Medication affordability   Has the Call Center Case Management Follow-up issue been resolved?  Yes   Follow-up Comments  CM started auth for medication, called pharmacy and provided 30 day free card info, and notified patient of what has been done.          Amber Khan    2/10/2020, 2:17 PM

## 2020-02-14 ENCOUNTER — DOCUMENTATION (OUTPATIENT)
Dept: CARDIAC REHAB | Facility: HOSPITAL | Age: 40
End: 2020-02-14

## 2020-02-14 NOTE — PROGRESS NOTES
Cardiac Rehab Evaluation: No CR per Dr. Perez. He will let me know when he is medically stable enough for CR

## 2020-02-18 ENCOUNTER — READMISSION MANAGEMENT (OUTPATIENT)
Dept: CALL CENTER | Facility: HOSPITAL | Age: 40
End: 2020-02-18

## 2020-02-18 NOTE — OUTREACH NOTE
CHF Week 2 Survey      Responses   Facility patient discharged from?  [facility]   Does the patient have one of the following disease processes/diagnoses(primary or secondary)?  CHF   Week 2 attempt successful?  No   Unsuccessful attempts  Attempt 1          Andrea Rob RN

## 2020-02-19 ENCOUNTER — READMISSION MANAGEMENT (OUTPATIENT)
Dept: CALL CENTER | Facility: HOSPITAL | Age: 40
End: 2020-02-19

## 2020-02-19 NOTE — OUTREACH NOTE
CHF Week 2 Survey      Responses   Facility patient discharged from?  [facility]   Does the patient have one of the following disease processes/diagnoses(primary or secondary)?  CHF   Week 2 attempt successful?  No   Unsuccessful attempts  Attempt 2          Celena Cheung RN

## 2020-02-24 ENCOUNTER — READMISSION MANAGEMENT (OUTPATIENT)
Dept: CALL CENTER | Facility: HOSPITAL | Age: 40
End: 2020-02-24

## 2020-02-24 NOTE — OUTREACH NOTE
CHF Week 3 Survey      Responses   Facility patient discharged from?  [facility]   Does the patient have one of the following disease processes/diagnoses(primary or secondary)?  CHF   Week 3 attempt successful?  No   Unsuccessful attempts  Attempt 1          Celena Cheung RN

## 2020-02-25 ENCOUNTER — READMISSION MANAGEMENT (OUTPATIENT)
Dept: CALL CENTER | Facility: HOSPITAL | Age: 40
End: 2020-02-25

## 2020-02-25 NOTE — OUTREACH NOTE
CHF Week 3 Survey      Responses   Facility patient discharged from?  [facility]   Does the patient have one of the following disease processes/diagnoses(primary or secondary)?  CHF   Week 3 attempt successful?  No   Unsuccessful attempts  Attempt 2          Janice Butcher RN

## 2020-03-04 ENCOUNTER — OFFICE VISIT (OUTPATIENT)
Dept: SLEEP MEDICINE | Facility: HOSPITAL | Age: 40
End: 2020-03-04

## 2020-03-04 VITALS
WEIGHT: 174 LBS | SYSTOLIC BLOOD PRESSURE: 113 MMHG | OXYGEN SATURATION: 98 % | BODY MASS INDEX: 24.36 KG/M2 | HEART RATE: 107 BPM | DIASTOLIC BLOOD PRESSURE: 77 MMHG | HEIGHT: 71 IN

## 2020-03-04 DIAGNOSIS — G47.33 OBSTRUCTIVE SLEEP APNEA, ADULT: Primary | ICD-10-CM

## 2020-03-04 DIAGNOSIS — I50.23 ACUTE ON CHRONIC HFREF (HEART FAILURE WITH REDUCED EJECTION FRACTION) (HCC): ICD-10-CM

## 2020-03-04 PROCEDURE — 99204 OFFICE O/P NEW MOD 45 MIN: CPT | Performed by: INTERNAL MEDICINE

## 2020-03-04 RX ORDER — PANTOPRAZOLE SODIUM 20 MG/1
20 TABLET, DELAYED RELEASE ORAL DAILY
COMMUNITY
Start: 2020-03-03 | End: 2020-10-12 | Stop reason: DRUGHIGH

## 2020-03-04 NOTE — PROGRESS NOTES
New Patient Sleep Medicine Consultation    Encounter Date: 3/4/2020         Patient's PCP: Joslyn Rousseau APRN  Referring provider: No ref. provider found  Reason for consultation chief complaint: snoring and excessive daytime sleepiness. Recent had severe orthopnea, had heart failure, now on Holter (Dec 2019)    Didier Dahl is a 40 y.o. male whose bedtime is ~ 8299-4768. He  falls asleep after 60-90 minutes, and is up 3-4 times per night. He wakes up ~ 6658-3295. He endorses 8 hours of sleep. He drinks 0 cups of coffee, 2-3 oz of teas, and 0 sodas per day. He drinks 0 alcoholic beverages per week.    Didier Dahl admits to snoring, unrestful sleep, gasping during sleep, stop breathing during sleep, Disturbed or restless sleep, memory loss, Up to the bathroom at night, night sweats, abnormal or violent dreams, difficulty falling asleep, difficulty staying asleep and takes medicine to help go to sleep. He denies cataplexy, sleep paralysis, or hypnagogic hallucinations. He does not take sedatives or hypnotics. He has some sleepiness with driving. He naps when unstimulated. He has been on amphetamines for > 5 years for ADHD. He has been on opiates in the past, muscle relaxers for pain, an antipsychotics for depression.    He continues to smoke. Smoking history: smoked 0.25 ppds from age 19 until present - but has quit on and off    Past Medical History:   Diagnosis Date   • Anxiety    • GERD (gastroesophageal reflux disease)      Social History     Socioeconomic History   • Marital status: Single     Spouse name: Not on file   • Number of children: Not on file   • Years of education: Not on file   • Highest education level: Not on file   Tobacco Use   • Smoking status: Current Every Day Smoker     Packs/day: 0.50   • Smokeless tobacco: Never Used   Substance and Sexual Activity   • Alcohol use: Yes     Comment: occ   • Drug use: No   • Sexual activity: Defer   single, no kids  Disabled -  "scoliosis  Former  - dishonorable discharge but was on strong antipsychotics  No family history on file.  0 brothers and 0 sisters  Other family history + for: sleep apnea, heart issues, diabetes  FH of sleep disorders: mother, stepfather    [place] - 15    Review of Systems:  Constitutional: negative  Eyes: negative  Ears, nose, mouth, throat, and face: negative  Respiratory: negative  Cardiovascular: negative  Gastrointestinal: negative  Genitourinary:negative  Integument/breast: negative  Hematologic/lymphatic: negative  Musculoskeletal:negative  Neurological: negative  Behavioral/Psych: negative  Endocrine: negative  Allergic/Immunologic: negative Patient advised to discuss any positive ROS with PCP.      Vitals:    03/04/20 1401   BP: 113/77   Pulse: 107   SpO2: 98%           03/04/20  1401   Weight: 78.9 kg (174 lb)       Body mass index is 24.28 kg/m². Patient's Body mass index is 24.28 kg/m². BMI is above normal parameters. Recommendations include: referral to primary care.    Neck circumference: 16.5\"          General: Alert. Cooperative. Well developed. No acute distress.             Head:  Normocephalic. Symmetrical. Atraumatic.              Eyes: Sclera clear. No icterus. PERRLA. Normal EOM.             Ears: No deformities. Normal hearing.             Nose: No septal deviation. No drainage.          Throat: No oral lesions. No thrush. Moist mucous membranes. Trachea midline    Tongue is normal    Dentition is fair with narrow bite       Pharynx: Posterior pharyngeal pillars are narrow    Mallampati score of III (soft and hard palate and base of uvula visible)    Pharynx is normal with unrermarkable tonsils   Chest Wall:  Normal shape. Symmetric expansion with respiration. No tenderness.          Lungs:  Clear to auscultation bilaterally. No wheezes. No rhonchi. No rales. Respirations regular, even and unlabored.            Heart:  Regular rhythm and normal rate. Normal S1 and S2. No murmur.     " Abdomen:  Soft, non-tender and non-distended. Normal bowel sounds. No masses.  Extremities:  Moves all extremities well. No edema.           Pulses: Pulses palpable and equal bilaterally.               Skin: Dry. Intact. No bleeding. No rash.           Neuro: Moves all 4 extremities and cranial nerves grossly intact.  Psychiatric: Normal mood and affect.      Current Outpatient Medications:   •  albuterol sulfate  (90 Base) MCG/ACT inhaler, Inhale 2 puffs Every 4 (Four) Hours As Needed for Wheezing., Disp: 1 inhaler, Rfl: 0  •  Calcium Carbonate Antacid 1250 MG/5ML, Take  by mouth., Disp: , Rfl:   •  digoxin (LANOXIN) 250 MCG tablet, Take 1 tablet by mouth Daily., Disp: 30 tablet, Rfl: 1  •  furosemide (LASIX) 40 MG tablet, Take 1 tablet by mouth 2 (Two) Times a Day., Disp: 60 tablet, Rfl: 1  •  lisdexamfetamine (VYVANSE) 70 MG capsule, Take 70 mg by mouth Every Morning Pt takes 1/2 in the morning and 1/2 in the afternoon , Disp: , Rfl:   •  Magnesium Oxide 400 (240 Mg) MG tablet, Take 1 tablet by mouth 2 (Two) Times a Day., Disp: 60 tablet, Rfl: 1  •  metoprolol tartrate (LOPRESSOR) 25 MG tablet, Take 0.5 tablets by mouth Every 12 (Twelve) Hours., Disp: 15 tablet, Rfl: 1  •  sacubitril-valsartan (ENTRESTO) 24-26 MG tablet, Take 1 tablet by mouth Every 12 (Twelve) Hours., Disp: 60 tablet, Rfl: 1  •  spironolactone (ALDACTONE) 25 MG tablet, Take 0.5 tablets by mouth Daily., Disp: 30 tablet, Rfl: 1  •  vitamin D (ERGOCALCIFEROL) 1.25 MG (54893 UT) capsule capsule, Take 50,000 Units by mouth Every 7 (Seven) Days., Disp: , Rfl:   •  warfarin (COUMADIN) 7.5 MG tablet, Take 7.5 mg by mouth Every Night., Disp: , Rfl:   •  zolpidem (AMBIEN) 10 MG tablet, Take 10 mg by mouth At Night As Needed for Sleep., Disp: , Rfl:   •  pantoprazole (PROTONIX) 20 MG EC tablet, , Disp: , Rfl:     Lab Results   Component Value Date    WBC 6.69 02/08/2020    HGB 13.3 02/08/2020    HCT 40.7 02/08/2020    MCV 88.5 02/08/2020      02/08/2020     Lab Results   Component Value Date    GLUCOSE 87 02/08/2020    CALCIUM 9.0 02/08/2020     02/08/2020    K 3.9 02/08/2020    CO2 29.0 02/08/2020     02/08/2020    BUN 12 02/08/2020    CREATININE 1.16 02/08/2020    EGFRIFNONA 70 02/08/2020    BCR 10.3 02/08/2020    ANIONGAP 11.0 02/08/2020     Lab Results   Component Value Date    INR 2.01 (H) 02/08/2020    INR 1.82 (H) 02/07/2020    INR 1.55 (H) 02/06/2020    PROTIME 22.5 (H) 02/08/2020    PROTIME 20.8 (H) 02/07/2020    PROTIME 18.4 (H) 02/06/2020     Lab Results   Component Value Date    CKTOTAL 397 (H) 12/04/2019    TROPONINT <0.010 02/05/2020       pH, Arterial   Date Value Ref Range Status   12/04/2019 7.452 (H) 7.350 - 7.450 pH units Final     Comment:     83 Value above reference range     pCO2, Arterial   Date Value Ref Range Status   12/04/2019 33.9 (L) 35.0 - 45.0 mm Hg Final     Comment:     84 Value below reference range     pO2, Arterial   Date Value Ref Range Status   12/04/2019 72.3 (L) 83.0 - 108.0 mm Hg Final     Comment:     84 Value below reference range     Contraindications to home sleep test: Moderate or severe congestive heart failure, please note class _3_    Assessment and Plan:    1. Obstructive sleep apnea - undiagnosed new problem with uncertain prognosis  and New (to me), additional work-up planned (4)  1. Check split night PSG  2. ADHD - stable, new to me on Vyvanse  3. Insomnia - sleep onset - new to me -   4. Continue Ambien for now  1. Was on [employer] sleep essentials in the past  5. HFrEF - 20% in 02/2020  1. Followed by cardiology  6. PTSD - stable, treated with meds in the past, on herbal treatment now  7. RLS - stable, new to me, no further workup planned at this point      RTC 2 weeks after testing         This document has been electronically signed by David Tomlinson MD on March 4, 2020         CC: Joslyn Rousseau, APRN          No ref. provider found

## 2020-03-11 ENCOUNTER — OFFICE VISIT (OUTPATIENT)
Dept: GASTROENTEROLOGY | Facility: CLINIC | Age: 40
End: 2020-03-11

## 2020-03-11 VITALS
OXYGEN SATURATION: 98 % | DIASTOLIC BLOOD PRESSURE: 68 MMHG | HEART RATE: 114 BPM | WEIGHT: 172 LBS | SYSTOLIC BLOOD PRESSURE: 112 MMHG | HEIGHT: 71 IN | BODY MASS INDEX: 24.08 KG/M2

## 2020-03-11 DIAGNOSIS — K21.00 GASTROESOPHAGEAL REFLUX DISEASE WITH ESOPHAGITIS: ICD-10-CM

## 2020-03-11 DIAGNOSIS — R74.8 ELEVATED LIVER ENZYMES: Primary | ICD-10-CM

## 2020-03-11 DIAGNOSIS — R10.31 RIGHT LOWER QUADRANT ABDOMINAL PAIN: ICD-10-CM

## 2020-03-11 DIAGNOSIS — D50.8 OTHER IRON DEFICIENCY ANEMIA: ICD-10-CM

## 2020-03-11 PROCEDURE — 99203 OFFICE O/P NEW LOW 30 MIN: CPT | Performed by: PHYSICIAN ASSISTANT

## 2020-03-11 NOTE — PROGRESS NOTES
Chief Complaint   Patient presents with   • Elevated Hepatic Enzymes     Ref. Joslyn JACK       ENDO PROCEDURE ORDERED:    Shay Dahl is a 40 y.o. male. he is being seen for consultation today at the request of MARCIE Cornell.    History of Present Illness    This 40-year-old disabled male is sent for consultation for elevated liver enzymes by MARCIE Cornell who saw the patient on 02/13/2020. The patient's cardiologist is Dr. Perez. He states his heart got much worse in December. He is on a LifeVest. He is being evaluated for possible transplant but states his heart has apparently improved. He has had some intermittent right lower quadrant pain. He states his heartburn does pretty well on Protonix 20 mg daily. He denied nausea, vomiting, dysphagia, bowel movements are regular without blood or mucus. Weight has been stable. He has never had a colonoscopy, does have family history of colon polyps.      PRIOR STUDIES: He had a CT scan of abdomen and pelvis negative 10/08/2018. More recent studies on 01/24/2020 cholesterol panel noted CMP showed a protein 5.9, albumin 2.2, total bilirubin 1.2, AST 47, , otherwise normal. CBC showed a hemoglobin of 13.5.      Laboratory on 02/04/2020 BNP showed elevation at 3,281. CMP showed a glucose 117, sodium 130, chloride 95, , , otherwise normal. Normal troponin. CBC showed a hemoglobin 12.5.      The patient was hospitalized 02/05-08/2020 with acute on chronic heart failure, shortness of breath. Initial laboratories INR 1.68, CMP showed a glucose 106, sodium 134, protein 5.8, , AST 94, otherwise normal. BNP 3,2019. CBC showed a hemoglobin 12.8. Doppler showed negative bilateral lower extremities for DVT. Chest x-ray negative. Echo showed aortoseptal defect, right-to-left shunting with tricuspid regurg, moderate dilated left atria, severe decreased left ventricular systolic function, less than 20%  ejection fraction. Laboratories closer to discharge BMP was ok, CBC normal, INR 2.01. He was referred for cardiac transplant at .      Patient is previous one-half pack per day smoker, does drink alcohol several times a week. He has had vein stripping. He has had an orbital fracture, scoliosis, spinal issues, pneumonia, heart problems as noted above. Family history of diabetes, heart disease, hypertension, nervous problems, allergies. Father may have had a fatty liver,  of heart disease at age 76, had diabetes, mother in apparent good health, he lists no spouse, siblings or children.     A/P: Patient with significant heart disease as noted above with moderate elevation in transaminases possibly related to heart disease. He is apparently scheduled to have a sleep study. I did recommend a hepatitis diagnostic panel, NICHOLAS, AMA, SMA, Alpha-1 antitrypsin, iron studies, AST for possible liver disease, may be related to his heart. I also recommend abdominal ultrasound to look at the liver, kidneys, and spleen. Will plan follow-up after the above, further pending clinical course and the results of the above.     Thank you very much Crystal for this consultation, and for allowing us to participate in the care of your patient. Will keep you informed.       The following portions of the patient's history were reviewed and updated as appropriate:   Past Medical History:   Diagnosis Date   • Anxiety    • Elevated liver enzymes    • GERD (gastroesophageal reflux disease)    • Heart trouble    • Pneumonia      Past Surgical History:   Procedure Laterality Date   • VASCULAR SURGERY       Family History   Problem Relation Age of Onset   • Diabetes Other    • Heart disease Other    • Hypertension Other    • Anxiety disorder Other    • Allergies Other        Allergies   Allergen Reactions   • Chantix [Varenicline]      Social History     Socioeconomic History   • Marital status: Single     Spouse name: Not on file   • Number of  children: Not on file   • Years of education: Not on file   • Highest education level: Not on file   Tobacco Use   • Smoking status: Former Smoker     Last attempt to quit: 3/1/2020     Years since quittin.0   • Smokeless tobacco: Never Used   Substance and Sexual Activity   • Alcohol use: Yes     Comment: occ   • Drug use: No   • Sexual activity: Defer     Current Medications:  Prior to Admission medications    Medication Sig Start Date End Date Taking? Authorizing Provider   albuterol sulfate  (90 Base) MCG/ACT inhaler Inhale 2 puffs Every 4 (Four) Hours As Needed for Wheezing. 19  Yes Naun Herbert MD   Calcium Carbonate Antacid 1250 MG/5ML Take  by mouth.   Yes ProviderLena MD   digoxin (LANOXIN) 250 MCG tablet Take 1 tablet by mouth Daily. 20  Yes Ricardo Grubbs MD   furosemide (LASIX) 40 MG tablet Take 1 tablet by mouth 2 (Two) Times a Day. 20  Yes Ricardo Grubbs MD   lisdexamfetamine (VYVANSE) 70 MG capsule Take 70 mg by mouth Every Morning Pt takes 1/2 in the morning and 1/2 in the afternoon    Yes ProviderLena MD   Magnesium Oxide 400 (240 Mg) MG tablet Take 1 tablet by mouth 2 (Two) Times a Day. 20  Yes Ricardo Grubbs MD   metoprolol tartrate (LOPRESSOR) 25 MG tablet Take 0.5 tablets by mouth Every 12 (Twelve) Hours. 20  Yes Ricardo Grubbs MD   pantoprazole (PROTONIX) 20 MG EC tablet  3/3/20  Yes ProviderLena MD   sacubitril-valsartan (ENTRESTO) 24-26 MG tablet Take 1 tablet by mouth Every 12 (Twelve) Hours. 20  Yes Ricardo Grubbs MD   spironolactone (ALDACTONE) 25 MG tablet Take 0.5 tablets by mouth Daily. 20  Yes Ricardo Grubbs MD   vitamin D (ERGOCALCIFEROL) 1.25 MG (26296 UT) capsule capsule Take 50,000 Units by mouth Every 7 (Seven) Days.   Yes ProviderLena MD   warfarin (COUMADIN) 7.5 MG tablet Take 7.5 mg by mouth Every Night.   Yes Provider, MD Lena   zolpidem (AMBIEN) 10 MG  "tablet Take 10 mg by mouth At Night As Needed for Sleep.   Yes Provider, Lena, MD     Review of Systems  Review of Systems   Constitutional: Negative for unexpected weight change.   HENT: Negative for trouble swallowing.    Gastrointestinal: Positive for abdominal pain and nausea. Negative for abdominal distention, anal bleeding, blood in stool, constipation, diarrhea, rectal pain and vomiting.   Genitourinary: Negative for difficulty urinating.   All other systems reviewed and are negative.         Objective    /68 (BP Location: Left arm)   Pulse 114   Ht 180.3 cm (71\")   Wt 78 kg (172 lb)   SpO2 98%   BMI 23.99 kg/m²   Physical Exam   Constitutional: He is oriented to person, place, and time. He appears well-developed and well-nourished. No distress.   HENT:   Head: Normocephalic and atraumatic.   Eyes: Pupils are equal, round, and reactive to light. EOM are normal.   Neck: Normal range of motion.   Cardiovascular: Normal rate and regular rhythm.   Irregular heart sounds   Pulmonary/Chest: Effort normal and breath sounds normal.   Abdominal: Soft. Bowel sounds are normal. He exhibits no shifting dullness, no distension, no abdominal bruit, no ascites and no mass. There is no hepatosplenomegaly. There is tenderness. There is no rigidity, no rebound, no guarding and no CVA tenderness. No hernia. Hernia confirmed negative in the ventral area.   mild   Musculoskeletal: Normal range of motion.   Neurological: He is alert and oriented to person, place, and time.   Skin: Skin is warm and dry.   Psychiatric: He has a normal mood and affect. His behavior is normal. Judgment and thought content normal.   Nursing note and vitals reviewed.    Assessment/Plan      1. Elevated liver enzymes    2. Other iron deficiency anemia    3. Gastroesophageal reflux disease with esophagitis    4. Right lower quadrant abdominal pain    .   Didier was seen today for elevated hepatic enzymes.    Diagnoses and all orders for " this visit:    Elevated liver enzymes  -     US Abdomen Complete  -     Iron and TIBC  -     Ferritin  -     AFP Tumor Marker  -     Alpha - 1 - Antitrypsin  -     Anti-Smooth Muscle Antibody Titer  -     Ceruloplasmin  -     Protime-INR  -     Nuclear Antigen Antibody, IFA  -     Mitochondrial Antibodies, M2  -     REYES Fibrosure  -     Hepatitis Panel, Acute    Other iron deficiency anemia  -     US Abdomen Complete  -     Iron and TIBC  -     Ferritin  -     AFP Tumor Marker  -     Alpha - 1 - Antitrypsin  -     Anti-Smooth Muscle Antibody Titer  -     Ceruloplasmin  -     Protime-INR  -     Nuclear Antigen Antibody, IFA  -     Mitochondrial Antibodies, M2  -     REYES Fibrosure  -     Hepatitis Panel, Acute    Gastroesophageal reflux disease with esophagitis  -     US Abdomen Complete  -     Iron and TIBC  -     Ferritin  -     AFP Tumor Marker  -     Alpha - 1 - Antitrypsin  -     Anti-Smooth Muscle Antibody Titer  -     Ceruloplasmin  -     Protime-INR  -     Nuclear Antigen Antibody, IFA  -     Mitochondrial Antibodies, M2  -     REYES Fibrosure  -     Hepatitis Panel, Acute    Right lower quadrant abdominal pain  -     US Abdomen Complete  -     Iron and TIBC  -     Ferritin  -     AFP Tumor Marker  -     Alpha - 1 - Antitrypsin  -     Anti-Smooth Muscle Antibody Titer  -     Ceruloplasmin  -     Protime-INR  -     Nuclear Antigen Antibody, IFA  -     Mitochondrial Antibodies, M2  -     REYES Fibrosure  -     Hepatitis Panel, Acute        Orders placed during this encounter include:  Orders Placed This Encounter   Procedures   • US Abdomen Complete     Order Specific Question:   Reason for Exam:     Answer:   elevated liver enzymes, heart failure   • Iron and TIBC   • Ferritin   • AFP Tumor Marker   • Alpha - 1 - Antitrypsin   • Anti-Smooth Muscle Antibody Titer   • Ceruloplasmin   • Protime-INR   • Nuclear Antigen Antibody, IFA   • Mitochondrial Antibodies, M2   • REYES Fibrosure   • Hepatitis Panel, Acute        Medications prescribed:  No orders of the defined types were placed in this encounter.      Requested Prescriptions      No prescriptions requested or ordered in this encounter       Review and/or summary of lab tests, radiology, procedures, medications. Review and summary of old records and obtaining of history. The risks and benefits of my recommendations, as well as other treatment options were discussed with the patient today. Questions were answered.    Follow-up: Return in about 1 month (around 4/11/2020), or if symptoms worsen or fail to improve.     * Surgery not found *      This document has been electronically signed by Alejandro Hughes PA-C on March 16, 2020 16:57      Results for orders placed or performed during the hospital encounter of 02/05/20   Gold Top - SST   Result Value Ref Range    Extra Tube Hold for add-ons.    Green Top (Gel)   Result Value Ref Range    Extra Tube Hold for add-ons.    Green Top (Gel)   Result Value Ref Range    Extra Tube Hold for add-ons.    CBC Auto Differential   Result Value Ref Range    WBC 6.69 3.40 - 10.80 10*3/mm3    RBC 4.60 4.14 - 5.80 10*6/mm3    Hemoglobin 13.3 13.0 - 17.7 g/dL    Hematocrit 40.7 37.5 - 51.0 %    MCV 88.5 79.0 - 97.0 fL    MCH 28.9 26.6 - 33.0 pg    MCHC 32.7 31.5 - 35.7 g/dL    RDW 14.0 12.3 - 15.4 %    RDW-SD 44.9 37.0 - 54.0 fl    MPV 10.8 6.0 - 12.0 fL    Platelets 211 140 - 450 10*3/mm3    Neutrophil % 46.3 42.7 - 76.0 %    Lymphocyte % 38.1 19.6 - 45.3 %    Monocyte % 10.5 5.0 - 12.0 %    Eosinophil % 4.0 0.3 - 6.2 %    Basophil % 1.0 0.0 - 1.5 %    Immature Grans % 0.1 0.0 - 0.5 %    Neutrophils, Absolute 3.09 1.70 - 7.00 10*3/mm3    Lymphocytes, Absolute 2.55 0.70 - 3.10 10*3/mm3    Monocytes, Absolute 0.70 0.10 - 0.90 10*3/mm3    Eosinophils, Absolute 0.27 0.00 - 0.40 10*3/mm3    Basophils, Absolute 0.07 0.00 - 0.20 10*3/mm3    Immature Grans, Absolute 0.01 0.00 - 0.05 10*3/mm3    nRBC 0.0 0.0 - 0.2 /100 WBC   CBC Auto  Differential   Result Value Ref Range    WBC 8.02 3.40 - 10.80 10*3/mm3    RBC 4.46 4.14 - 5.80 10*6/mm3    Hemoglobin 12.8 (L) 13.0 - 17.7 g/dL    Hematocrit 39.0 37.5 - 51.0 %    MCV 87.4 79.0 - 97.0 fL    MCH 28.7 26.6 - 33.0 pg    MCHC 32.8 31.5 - 35.7 g/dL    RDW 13.9 12.3 - 15.4 %    RDW-SD 44.7 37.0 - 54.0 fl    MPV 10.6 6.0 - 12.0 fL    Platelets 226 140 - 450 10*3/mm3    Neutrophil % 58.2 42.7 - 76.0 %    Lymphocyte % 30.9 19.6 - 45.3 %    Monocyte % 8.7 5.0 - 12.0 %    Eosinophil % 1.5 0.3 - 6.2 %    Basophil % 0.5 0.0 - 1.5 %    Immature Grans % 0.2 0.0 - 0.5 %    Neutrophils, Absolute 4.66 1.70 - 7.00 10*3/mm3    Lymphocytes, Absolute 2.48 0.70 - 3.10 10*3/mm3    Monocytes, Absolute 0.70 0.10 - 0.90 10*3/mm3    Eosinophils, Absolute 0.12 0.00 - 0.40 10*3/mm3    Basophils, Absolute 0.04 0.00 - 0.20 10*3/mm3    Immature Grans, Absolute 0.02 0.00 - 0.05 10*3/mm3    nRBC 0.0 0.0 - 0.2 /100 WBC   Lavender Top   Result Value Ref Range    Extra Tube hold for add-on    Lavender Top   Result Value Ref Range    Extra Tube hold for add-on    Light Blue Top   Result Value Ref Range    Extra Tube hold for add-on    Troponin   Result Value Ref Range    Troponin T <0.010 0.000 - 0.030 ng/mL   Protime-INR   Result Value Ref Range    Protime 22.5 (H) 11.1 - 15.3 Seconds    INR 2.01 (H) 0.80 - 1.20   Protime-INR   Result Value Ref Range    Protime 20.8 (H) 11.1 - 15.3 Seconds    INR 1.82 (H) 0.80 - 1.20   Protime-INR   Result Value Ref Range    Protime 18.4 (H) 11.1 - 15.3 Seconds    INR 1.55 (H) 0.80 - 1.20   Protime-INR   Result Value Ref Range    Protime 19.6 (H) 11.1 - 15.3 Seconds    INR 1.68 (H) 0.80 - 1.20   BNP   Result Value Ref Range    proBNP 3,219.0 (H) 5.0 - 450.0 pg/mL   Comprehensive Metabolic Panel   Result Value Ref Range    Glucose 106 (H) 65 - 99 mg/dL    BUN 18 6 - 20 mg/dL    Creatinine 1.02 0.76 - 1.27 mg/dL    Sodium 134 (L) 136 - 145 mmol/L    Potassium 4.3 3.5 - 5.2 mmol/L    Chloride 98 98 - 107  mmol/L    CO2 24.0 22.0 - 29.0 mmol/L    Calcium 9.0 8.6 - 10.5 mg/dL    Total Protein 5.8 (L) 6.0 - 8.5 g/dL    Albumin 3.60 3.50 - 5.20 g/dL    ALT (SGPT) 135 (H) 1 - 41 U/L    AST (SGOT) 94 (H) 1 - 40 U/L    Alkaline Phosphatase 51 39 - 117 U/L    Total Bilirubin 0.7 0.2 - 1.2 mg/dL    eGFR Non African Amer 82 >60 mL/min/1.73    Globulin 2.2 gm/dL    A/G Ratio 1.6 g/dL    BUN/Creatinine Ratio 17.6 7.0 - 25.0    Anion Gap 12.0 5.0 - 15.0 mmol/L   Basic Metabolic Panel   Result Value Ref Range    Glucose 87 65 - 99 mg/dL    BUN 12 6 - 20 mg/dL    Creatinine 1.16 0.76 - 1.27 mg/dL    Sodium 141 136 - 145 mmol/L    Potassium 3.9 3.5 - 5.2 mmol/L    Chloride 101 98 - 107 mmol/L    CO2 29.0 22.0 - 29.0 mmol/L    Calcium 9.0 8.6 - 10.5 mg/dL    eGFR Non African Amer 70 >60 mL/min/1.73    BUN/Creatinine Ratio 10.3 7.0 - 25.0    Anion Gap 11.0 5.0 - 15.0 mmol/L   Adult Transthoracic Echo Complete With Contrast if Necessary Per Protocol   Result Value Ref Range    BSA 2.1 m^2    RVIDd 4.0 cm    IVSd 0.88 cm    LVIDd 7.2 cm    LVIDs 7.0 cm    LVPWd 0.92 cm    IVS/LVPW 0.95     FS 2.8 %    EDV(Teich) 268.8 ml    ESV(Teich) 252.1 ml    EF(Teich) 6.2 %    EDV(cubed) 367.1 ml    ESV(cubed) 337.2 ml    EF(cubed) 8.1 %    LV mass(C)d 294.1 grams    LV mass(C)dI 143.2 grams/m^2    SV(Teich) 16.6 ml    SI(Teich) 8.1 ml/m^2    SV(cubed) 29.9 ml    SI(cubed) 14.6 ml/m^2    EPSS 3.3 cm    Ao root diam 3.7 cm    Ao root area 10.8 cm^2    ACS 2.7 cm    LA dimension 5.9 cm    asc Aorta Diam 2.9 cm    LA/Ao 1.6     LVOT diam 2.6 cm    LVOT area 5.3 cm^2    LVOT area(traced) 5.3 cm^2    LVLd ap4 9.7 cm    EDV(MOD-sp4) 291.0 ml    LVLs ap4 8.8 cm    ESV(MOD-sp4) 252.0 ml    EF(MOD-sp4) 13.4 %    LVLd ap2 10.2 cm    EDV(MOD-sp2) 249.0 ml    LVLs ap2 9.5 cm    ESV(MOD-sp2) 236.0 ml    EF(MOD-sp2) 5.2 %    SV(MOD-sp4) 39.0 ml    SI(MOD-sp4) 19.0 ml/m^2    SV(MOD-sp2) 13.0 ml    SI(MOD-sp2) 6.3 ml/m^2    Ao root area (BSA corrected) 1.8     LV  Hernandez Vol (BSA corrected) 141.7 ml/m^2    LV Sys Vol (BSA corrected) 122.7 ml/m^2    MV E max corine 76.4 cm/sec    MV A max corine 44.9 cm/sec    MV E/A 1.7     MV P1/2t max corine 80.8 cm/sec    MV P1/2t 61.3 msec    MVA(P1/2t) 3.6 cm^2    MV dec slope 386.0 cm/sec^2    Ao pk corine 56.9 cm/sec    Ao max PG 1.3 mmHg    Ao max PG (full) 0.4 mmHg    Ao V2 mean 43.6 cm/sec    Ao mean PG 1.0 mmHg    Ao mean PG (full) 0 mmHg    Ao V2 VTI 7.9 cm    SIVAN(I,A) 4.4 cm^2    SIVAN(I,D) 4.4 cm^2    SIVAN(V,A) 4.4 cm^2    SIVAN(V,D) 4.4 cm^2    LV V1 max PG 0.89 mmHg    LV V1 mean PG 1.0 mmHg    LV V1 max 47.3 cm/sec    LV V1 mean 33.8 cm/sec    LV V1 VTI 6.6 cm    MR max corine 398.0 cm/sec    MR max PG 63.4 mmHg    SV(Ao) 85.4 ml    SI(Ao) 41.6 ml/m^2    SV(LVOT) 35.0 ml    SI(LVOT) 17.1 ml/m^2    PA V2 max 55.8 cm/sec    PA max PG 1.2 mmHg    PA max PG (full) 0.79 mmHg    RV V1 max PG 0.46 mmHg    RV V1 mean PG 0 mmHg    RV V1 max 33.9 cm/sec    RV V1 mean 22.3 cm/sec    RV V1 VTI 4.7 cm    TR max corine 267.0 cm/sec    MVA P1/2T LCG 2.7 cm^2     CV ECHO KELSEY - BZI_BMI 26.2 kilograms/m^2     CV ECHO KELSEY - BSA(HAYCOCK) 2.1 m^2     CV ECHO KELSEY - BZI_METRIC_WEIGHT 85.3 kg     CV ECHO KELSEY - BZI_METRIC_HEIGHT 180.3 cm    Target HR (85%) 155 bpm    Max. Pred. HR (100%) 182 bpm     *Note: Due to a large number of results and/or encounters for the requested time period, some results have not been displayed. A complete set of results can be found in Results Review.       Some portions of this note have been dictated using voice recognition software and may contain errors and/or omissions.

## 2020-03-11 NOTE — PATIENT INSTRUCTIONS

## 2020-03-24 ENCOUNTER — APPOINTMENT (OUTPATIENT)
Dept: SLEEP MEDICINE | Facility: HOSPITAL | Age: 40
End: 2020-03-24

## 2020-04-01 ENCOUNTER — HOSPITAL ENCOUNTER (OUTPATIENT)
Dept: ULTRASOUND IMAGING | Facility: HOSPITAL | Age: 40
End: 2020-04-01

## 2020-04-02 ENCOUNTER — TELEPHONE (OUTPATIENT)
Dept: GENERAL RADIOLOGY | Facility: HOSPITAL | Age: 40
End: 2020-04-02

## 2020-04-03 NOTE — TELEPHONE ENCOUNTER
PT: SANYA BULLARD : 1980, DID NOT SHOW UP FOR HIS US ABDOMEN COMPLETE APPOINTMENT ON 2020 AT 9:15AM.

## 2020-05-14 ENCOUNTER — OFFICE VISIT (OUTPATIENT)
Dept: GASTROENTEROLOGY | Facility: CLINIC | Age: 40
End: 2020-05-14

## 2020-05-14 ENCOUNTER — APPOINTMENT (OUTPATIENT)
Dept: LAB | Facility: HOSPITAL | Age: 40
End: 2020-05-14

## 2020-05-14 VITALS
DIASTOLIC BLOOD PRESSURE: 81 MMHG | SYSTOLIC BLOOD PRESSURE: 107 MMHG | BODY MASS INDEX: 25.17 KG/M2 | HEART RATE: 115 BPM | HEIGHT: 71 IN | WEIGHT: 179.8 LBS

## 2020-05-14 DIAGNOSIS — R74.8 ELEVATED LIVER ENZYMES: Primary | ICD-10-CM

## 2020-05-14 DIAGNOSIS — D50.8 OTHER IRON DEFICIENCY ANEMIA: ICD-10-CM

## 2020-05-14 DIAGNOSIS — K21.00 GASTROESOPHAGEAL REFLUX DISEASE WITH ESOPHAGITIS: ICD-10-CM

## 2020-05-14 DIAGNOSIS — R10.31 RIGHT LOWER QUADRANT ABDOMINAL PAIN: ICD-10-CM

## 2020-05-14 LAB
ALPHA-FETOPROTEIN: 5 NG/ML (ref 0–8.3)
ALPHA1 GLOB MFR UR ELPH: 152 MG/DL (ref 90–200)
CERULOPLASMIN SERPL-MCNC: 31 MG/DL (ref 16–31)
FERRITIN SERPL-MCNC: 31.8 NG/ML (ref 30–400)
HAV IGM SERPL QL IA: NORMAL
HBV CORE IGM SERPL QL IA: NORMAL
HBV SURFACE AG SERPL QL IA: NORMAL
HCV AB SER DONR QL: NORMAL
INR PPP: 1.17 (ref 0.8–1.2)
IRON 24H UR-MRATE: 78 MCG/DL (ref 59–158)
IRON SATN MFR SERPL: 12 % (ref 20–50)
PROTHROMBIN TIME: 14.7 SECONDS (ref 11.1–15.3)
TIBC SERPL-MCNC: 633 MCG/DL (ref 298–536)
TRANSFERRIN SERPL-MCNC: 425 MG/DL (ref 200–360)

## 2020-05-14 PROCEDURE — 84478 ASSAY OF TRIGLYCERIDES: CPT | Performed by: PHYSICIAN ASSISTANT

## 2020-05-14 PROCEDURE — 82172 ASSAY OF APOLIPOPROTEIN: CPT | Performed by: PHYSICIAN ASSISTANT

## 2020-05-14 PROCEDURE — 82390 ASSAY OF CERULOPLASMIN: CPT | Performed by: PHYSICIAN ASSISTANT

## 2020-05-14 PROCEDURE — 84466 ASSAY OF TRANSFERRIN: CPT | Performed by: PHYSICIAN ASSISTANT

## 2020-05-14 PROCEDURE — 83516 IMMUNOASSAY NONANTIBODY: CPT | Performed by: PHYSICIAN ASSISTANT

## 2020-05-14 PROCEDURE — 82728 ASSAY OF FERRITIN: CPT | Performed by: PHYSICIAN ASSISTANT

## 2020-05-14 PROCEDURE — 84450 TRANSFERASE (AST) (SGOT): CPT | Performed by: PHYSICIAN ASSISTANT

## 2020-05-14 PROCEDURE — 85610 PROTHROMBIN TIME: CPT | Performed by: PHYSICIAN ASSISTANT

## 2020-05-14 PROCEDURE — 82247 BILIRUBIN TOTAL: CPT | Performed by: PHYSICIAN ASSISTANT

## 2020-05-14 PROCEDURE — 82105 ALPHA-FETOPROTEIN SERUM: CPT | Performed by: PHYSICIAN ASSISTANT

## 2020-05-14 PROCEDURE — 99213 OFFICE O/P EST LOW 20 MIN: CPT | Performed by: PHYSICIAN ASSISTANT

## 2020-05-14 PROCEDURE — 83883 ASSAY NEPHELOMETRY NOT SPEC: CPT | Performed by: PHYSICIAN ASSISTANT

## 2020-05-14 PROCEDURE — 83540 ASSAY OF IRON: CPT | Performed by: PHYSICIAN ASSISTANT

## 2020-05-14 PROCEDURE — 36415 COLL VENOUS BLD VENIPUNCTURE: CPT | Performed by: PHYSICIAN ASSISTANT

## 2020-05-14 PROCEDURE — 80074 ACUTE HEPATITIS PANEL: CPT | Performed by: PHYSICIAN ASSISTANT

## 2020-05-14 PROCEDURE — 82977 ASSAY OF GGT: CPT | Performed by: PHYSICIAN ASSISTANT

## 2020-05-14 PROCEDURE — 86038 ANTINUCLEAR ANTIBODIES: CPT | Performed by: PHYSICIAN ASSISTANT

## 2020-05-14 PROCEDURE — 82465 ASSAY BLD/SERUM CHOLESTEROL: CPT | Performed by: PHYSICIAN ASSISTANT

## 2020-05-14 PROCEDURE — 82103 ALPHA-1-ANTITRYPSIN TOTAL: CPT | Performed by: PHYSICIAN ASSISTANT

## 2020-05-14 PROCEDURE — 84460 ALANINE AMINO (ALT) (SGPT): CPT | Performed by: PHYSICIAN ASSISTANT

## 2020-05-14 PROCEDURE — 83010 ASSAY OF HAPTOGLOBIN QUANT: CPT | Performed by: PHYSICIAN ASSISTANT

## 2020-05-14 PROCEDURE — 82947 ASSAY GLUCOSE BLOOD QUANT: CPT | Performed by: PHYSICIAN ASSISTANT

## 2020-05-14 NOTE — PATIENT INSTRUCTIONS

## 2020-05-14 NOTE — PROGRESS NOTES
Chief Complaint   Patient presents with   • Elevated Hepatic Enzymes   • Anemia   • Heartburn       ENDO PROCEDURE ORDERED:    Subjective    Didier Dahl is a 40 y.o. male. he is here today for follow-up.    History of Present Illness    The patient is seen on a recheck of his GERD, anemia, elevated liver enzymes. Last seen 03/11/2020. The patient did not get his laboratory or ultrasound done. He states he got sick on his way to have the studies done and never tried to reschedule them or contact the office because of the COVID-19. He states he continues to have 3 out of 10 right lower quadrant pain. He thinks it may be gas pain or a strained muscle. He states he has had it for years. We did have a long discussion. He may have a right inguinal hernia. He is on Protonix. He denies nausea, vomiting or dysphagia. Bowels are moving without blood or mucus. Weight is up 7.8 pounds since last visit. He does have a family history of colon polyps. He has never had a colonoscopy.    ASSESSMENT/PLAN: The patient is on a LifeVest for his heart. He was encouraged to followup with his other care providers. I have rescheduled him for his ultrasound and strongly recommend he get laboratories ordered to evaluate his elevated liver enzymes and anemia. Will otherwise plan followup after the above, further pending clinical course and the results of the above.      The following portions of the patient's history were reviewed and updated as appropriate:   Past Medical History:   Diagnosis Date   • Anxiety    • Elevated liver enzymes    • GERD (gastroesophageal reflux disease)    • Heart trouble    • Pneumonia      Past Surgical History:   Procedure Laterality Date   • VASCULAR SURGERY       Family History   Problem Relation Age of Onset   • Diabetes Other    • Heart disease Other    • Hypertension Other    • Anxiety disorder Other    • Allergies Other        Allergies   Allergen Reactions   • Chantix [Varenicline] Anaphylaxis      Social History     Socioeconomic History   • Marital status: Single     Spouse name: Not on file   • Number of children: Not on file   • Years of education: Not on file   • Highest education level: Not on file   Tobacco Use   • Smoking status: Former Smoker     Last attempt to quit: 3/1/2020     Years since quittin.2   • Smokeless tobacco: Never Used   Substance and Sexual Activity   • Alcohol use: Yes     Comment: occ   • Drug use: No   • Sexual activity: Defer     Current Medications:  Prior to Admission medications    Medication Sig Start Date End Date Taking? Authorizing Provider   albuterol sulfate  (90 Base) MCG/ACT inhaler Inhale 2 puffs Every 4 (Four) Hours As Needed for Wheezing. 19  Yes Naun Herbert MD   digoxin (LANOXIN) 250 MCG tablet Take 1 tablet by mouth Daily. 20  Yes Ricardo Grubbs MD   furosemide (LASIX) 40 MG tablet Take 1 tablet by mouth 2 (Two) Times a Day. 20  Yes Ricardo Grubbs MD   lisdexamfetamine (VYVANSE) 70 MG capsule Take 70 mg by mouth Every Morning Pt takes 1/2 in the morning and 1/2 in the afternoon    Yes Lena Cabrera MD   Magnesium Oxide 400 (240 Mg) MG tablet Take 1 tablet by mouth 2 (Two) Times a Day. 20  Yes Ricardo Grubbs MD   metoprolol tartrate (LOPRESSOR) 25 MG tablet Take 0.5 tablets by mouth Every 12 (Twelve) Hours. 20  Yes Ricardo Grubbs MD   pantoprazole (PROTONIX) 20 MG EC tablet Take 20 mg by mouth Daily. 3/3/20  Yes Lena Cabrera MD   sacubitril-valsartan (ENTRESTO) 24-26 MG tablet Take 1 tablet by mouth Every 12 (Twelve) Hours. 20  Yes Ricardo Grubbs MD   spironolactone (ALDACTONE) 25 MG tablet Take 0.5 tablets by mouth Daily. 20  Yes Ricardo Grubbs MD   warfarin (COUMADIN) 7.5 MG tablet Take 7.5 mg by mouth Every Night. Takes every 4 days or so   Yes Lena Cabrera MD   zolpidem (AMBIEN) 10 MG tablet Take 10 mg by mouth At Night As Needed for Sleep.   Yes  "Lnea Cabrera MD   Calcium Carbonate Antacid 1250 MG/5ML Take  by mouth.    Lena Cabrera MD   vitamin D (ERGOCALCIFEROL) 1.25 MG (39823 UT) capsule capsule Take 50,000 Units by mouth Every 7 (Seven) Days.    Lena Cabrera MD     Review of Systems  Review of Systems       Objective    /81 (BP Location: Left arm)   Pulse 115   Ht 180.3 cm (71\")   Wt 81.6 kg (179 lb 12.8 oz)   BMI 25.08 kg/m²   Physical Exam   Constitutional: He is oriented to person, place, and time. He appears well-developed and well-nourished. No distress.   HENT:   Head: Normocephalic and atraumatic.   Eyes: Pupils are equal, round, and reactive to light. EOM are normal.   Neck: Normal range of motion.   Cardiovascular: Normal rate and regular rhythm.   Irregular heart sounds   Pulmonary/Chest: Effort normal and breath sounds normal.   Abdominal: Soft. Bowel sounds are normal. He exhibits no shifting dullness, no distension, no abdominal bruit, no ascites and no mass. There is no hepatosplenomegaly. There is tenderness. There is no rigidity, no rebound, no guarding and no CVA tenderness. No hernia. Hernia confirmed negative in the ventral area.   mild   Musculoskeletal: Normal range of motion.   Neurological: He is alert and oriented to person, place, and time.   Skin: Skin is warm and dry.   Psychiatric: He has a normal mood and affect. His behavior is normal. Judgment and thought content normal.   Nursing note and vitals reviewed.    Assessment/Plan      1. Elevated liver enzymes    2. Other iron deficiency anemia    3. Gastroesophageal reflux disease with esophagitis    4. Right lower quadrant abdominal pain    .   Didier was seen today for elevated hepatic enzymes, anemia and heartburn.    Diagnoses and all orders for this visit:    Elevated liver enzymes  -     US Abdomen Complete    Other iron deficiency anemia  -     US Abdomen Complete    Gastroesophageal reflux disease with esophagitis  -     US Abdomen " Complete    Right lower quadrant abdominal pain  -     US Abdomen Complete        Orders placed during this encounter include:  Orders Placed This Encounter   Procedures   • US Abdomen Complete     Order Specific Question:   Reason for Exam:     Answer:   abd pain, elevated LFT       Medications prescribed:  No orders of the defined types were placed in this encounter.      Requested Prescriptions      No prescriptions requested or ordered in this encounter       Review and/or summary of lab tests, radiology, procedures, medications. Review and summary of old records and obtaining of history. The risks and benefits of my recommendations, as well as other treatment options were discussed with the patient today. Questions were answered.    Follow-up: Return in about 1 month (around 6/14/2020), or if symptoms worsen or fail to improve.     * Surgery not found *      This document has been electronically signed by Alejandro Hughes PA-C on May 15, 2020 15:39      Results for orders placed or performed in visit on 03/11/20   Hepatitis Panel, Acute   Result Value Ref Range    Hepatitis B Surface Ag Non-Reactive Non-Reactive    Hep A IgM Non-Reactive Non-Reactive    Hep B C IgM Non-Reactive Non-Reactive    Hepatitis C Ab Non-Reactive Non-Reactive   Hep B Confirmation Tube   Result Value Ref Range    Extra Tube Hold for add-ons.    Nuclear Antigen Antibody, IFA   Result Value Ref Range    NICHOLAS Negative    Iron and TIBC   Result Value Ref Range    Iron 78 59 - 158 mcg/dL    Iron Saturation 12 (L) 20 - 50 %    Transferrin 425 (H) 200 - 360 mg/dL    TIBC 633 (H) 298 - 536 mcg/dL   Alpha - 1 - Antitrypsin   Result Value Ref Range    ALPHA -1 ANTITRYPSIN 152 90 - 200 mg/dL   Mitochondrial Antibodies, M2   Result Value Ref Range    Mitochondrial Ab <20.0 0.0 - 20.0 Units   Ceruloplasmin   Result Value Ref Range    Ceruloplasmin 31 16 - 31 mg/dL   AFP Tumor Marker   Result Value Ref Range    ALPHA-FETOPROTEIN 5.00 0 - 8.3 ng/mL    Protime-INR   Result Value Ref Range    Protime 14.7 11.1 - 15.3 Seconds    INR 1.17 0.80 - 1.20   Ferritin   Result Value Ref Range    Ferritin 31.80 30.00 - 400.00 ng/mL   Results for orders placed or performed during the hospital encounter of 02/05/20   Gold Top - SST   Result Value Ref Range    Extra Tube Hold for add-ons.    Green Top (Gel)   Result Value Ref Range    Extra Tube Hold for add-ons.    Green Top (Gel)   Result Value Ref Range    Extra Tube Hold for add-ons.    CBC Auto Differential   Result Value Ref Range    WBC 6.69 3.40 - 10.80 10*3/mm3    RBC 4.60 4.14 - 5.80 10*6/mm3    Hemoglobin 13.3 13.0 - 17.7 g/dL    Hematocrit 40.7 37.5 - 51.0 %    MCV 88.5 79.0 - 97.0 fL    MCH 28.9 26.6 - 33.0 pg    MCHC 32.7 31.5 - 35.7 g/dL    RDW 14.0 12.3 - 15.4 %    RDW-SD 44.9 37.0 - 54.0 fl    MPV 10.8 6.0 - 12.0 fL    Platelets 211 140 - 450 10*3/mm3    Neutrophil % 46.3 42.7 - 76.0 %    Lymphocyte % 38.1 19.6 - 45.3 %    Monocyte % 10.5 5.0 - 12.0 %    Eosinophil % 4.0 0.3 - 6.2 %    Basophil % 1.0 0.0 - 1.5 %    Immature Grans % 0.1 0.0 - 0.5 %    Neutrophils, Absolute 3.09 1.70 - 7.00 10*3/mm3    Lymphocytes, Absolute 2.55 0.70 - 3.10 10*3/mm3    Monocytes, Absolute 0.70 0.10 - 0.90 10*3/mm3    Eosinophils, Absolute 0.27 0.00 - 0.40 10*3/mm3    Basophils, Absolute 0.07 0.00 - 0.20 10*3/mm3    Immature Grans, Absolute 0.01 0.00 - 0.05 10*3/mm3    nRBC 0.0 0.0 - 0.2 /100 WBC   CBC Auto Differential   Result Value Ref Range    WBC 8.02 3.40 - 10.80 10*3/mm3    RBC 4.46 4.14 - 5.80 10*6/mm3    Hemoglobin 12.8 (L) 13.0 - 17.7 g/dL    Hematocrit 39.0 37.5 - 51.0 %    MCV 87.4 79.0 - 97.0 fL    MCH 28.7 26.6 - 33.0 pg    MCHC 32.8 31.5 - 35.7 g/dL    RDW 13.9 12.3 - 15.4 %    RDW-SD 44.7 37.0 - 54.0 fl    MPV 10.6 6.0 - 12.0 fL    Platelets 226 140 - 450 10*3/mm3    Neutrophil % 58.2 42.7 - 76.0 %    Lymphocyte % 30.9 19.6 - 45.3 %    Monocyte % 8.7 5.0 - 12.0 %    Eosinophil % 1.5 0.3 - 6.2 %    Basophil % 0.5  0.0 - 1.5 %    Immature Grans % 0.2 0.0 - 0.5 %    Neutrophils, Absolute 4.66 1.70 - 7.00 10*3/mm3    Lymphocytes, Absolute 2.48 0.70 - 3.10 10*3/mm3    Monocytes, Absolute 0.70 0.10 - 0.90 10*3/mm3    Eosinophils, Absolute 0.12 0.00 - 0.40 10*3/mm3    Basophils, Absolute 0.04 0.00 - 0.20 10*3/mm3    Immature Grans, Absolute 0.02 0.00 - 0.05 10*3/mm3    nRBC 0.0 0.0 - 0.2 /100 WBC   Lavender Top   Result Value Ref Range    Extra Tube hold for add-on    Lavender Top   Result Value Ref Range    Extra Tube hold for add-on    Light Blue Top   Result Value Ref Range    Extra Tube hold for add-on    Troponin   Result Value Ref Range    Troponin T <0.010 0.000 - 0.030 ng/mL   Protime-INR   Result Value Ref Range    Protime 22.5 (H) 11.1 - 15.3 Seconds    INR 2.01 (H) 0.80 - 1.20   Protime-INR   Result Value Ref Range    Protime 20.8 (H) 11.1 - 15.3 Seconds    INR 1.82 (H) 0.80 - 1.20   Protime-INR   Result Value Ref Range    Protime 18.4 (H) 11.1 - 15.3 Seconds    INR 1.55 (H) 0.80 - 1.20   Protime-INR   Result Value Ref Range    Protime 19.6 (H) 11.1 - 15.3 Seconds    INR 1.68 (H) 0.80 - 1.20   BNP   Result Value Ref Range    proBNP 3,219.0 (H) 5.0 - 450.0 pg/mL   Comprehensive Metabolic Panel   Result Value Ref Range    Glucose 106 (H) 65 - 99 mg/dL    BUN 18 6 - 20 mg/dL    Creatinine 1.02 0.76 - 1.27 mg/dL    Sodium 134 (L) 136 - 145 mmol/L    Potassium 4.3 3.5 - 5.2 mmol/L    Chloride 98 98 - 107 mmol/L    CO2 24.0 22.0 - 29.0 mmol/L    Calcium 9.0 8.6 - 10.5 mg/dL    Total Protein 5.8 (L) 6.0 - 8.5 g/dL    Albumin 3.60 3.50 - 5.20 g/dL    ALT (SGPT) 135 (H) 1 - 41 U/L    AST (SGOT) 94 (H) 1 - 40 U/L    Alkaline Phosphatase 51 39 - 117 U/L    Total Bilirubin 0.7 0.2 - 1.2 mg/dL    eGFR Non African Amer 82 >60 mL/min/1.73    Globulin 2.2 gm/dL    A/G Ratio 1.6 g/dL    BUN/Creatinine Ratio 17.6 7.0 - 25.0    Anion Gap 12.0 5.0 - 15.0 mmol/L     *Note: Due to a large number of results and/or encounters for the requested  time period, some results have not been displayed. A complete set of results can be found in Results Review.       Some portions of this note have been dictated using voice recognition software and may contain errors and/or omissions.

## 2020-05-15 LAB
ANA SER QL IA: NEGATIVE
DEPRECATED MITOCHONDRIA M2 IGG SER-ACNC: <20 UNITS (ref 0–20)
HOLD SPECIMEN: NORMAL

## 2020-05-16 LAB
A2 MACROGLOB SERPL-MCNC: 197 MG/DL (ref 110–276)
ACTIN IGG SERPL-ACNC: 5 UNITS (ref 0–19)
ALT SERPL W P-5'-P-CCNC: 37 IU/L (ref 0–55)
APO A-I SERPL-MCNC: 113 MG/DL (ref 101–178)
AST SERPL W P-5'-P-CCNC: 34 IU/L (ref 0–40)
BILIRUB SERPL-MCNC: 1.1 MG/DL (ref 0–1.2)
CHOLEST SERPL-MCNC: 147 MG/DL (ref 100–199)
FIBROSIS SCORING:: ABNORMAL
FIBROSIS STAGE SERPL QL: ABNORMAL
GGT SERPL-CCNC: 158 IU/L (ref 0–65)
GLUCOSE SERPL-MCNC: 84 MG/DL (ref 65–99)
HAPTOGLOB SERPL-MCNC: 120 MG/DL (ref 17–317)
INTERPRETATIONS: (REFERENCE): ABNORMAL
LABORATORY COMMENT REPORT: ABNORMAL
LIMITATIONS: (REFERENCE): ABNORMAL
LIVER FIBR SCORE SERPL CALC.FIBROSURE: 0.54 (ref 0–0.21)
NASH GRADE (REFERENCE): ABNORMAL
NASH SCORE (REFERENCE): 0.25
NASH SCORING (REFERENCE): ABNORMAL
STEATOSIS GRADE (REFERENCE): ABNORMAL
STEATOSIS GRADING (REFERENCE): ABNORMAL
STEATOSIS SCORE (REFERENCE): 0.51 (ref 0–0.3)
TRIGL SERPL-MCNC: 87 MG/DL (ref 0–149)
WEIGHT: (REFERENCE): 179 LBS

## 2020-05-26 ENCOUNTER — HOSPITAL ENCOUNTER (OUTPATIENT)
Dept: SLEEP MEDICINE | Facility: HOSPITAL | Age: 40
Discharge: HOME | End: 2020-05-26
Admitting: INTERNAL MEDICINE

## 2020-05-26 VITALS — WEIGHT: 179.9 LBS | BODY MASS INDEX: 25.19 KG/M2 | HEIGHT: 71 IN

## 2020-05-26 DIAGNOSIS — G47.33 OBSTRUCTIVE SLEEP APNEA, ADULT: ICD-10-CM

## 2020-05-26 PROCEDURE — 95810 POLYSOM 6/> YRS 4/> PARAM: CPT

## 2020-05-26 PROCEDURE — 95810 POLYSOM 6/> YRS 4/> PARAM: CPT | Performed by: PSYCHIATRY & NEUROLOGY

## 2020-05-28 ENCOUNTER — APPOINTMENT (OUTPATIENT)
Dept: ULTRASOUND IMAGING | Facility: HOSPITAL | Age: 40
End: 2020-05-28

## 2020-05-29 ENCOUNTER — TELEPHONE (OUTPATIENT)
Dept: GENERAL RADIOLOGY | Facility: HOSPITAL | Age: 40
End: 2020-05-29

## 2020-06-02 ENCOUNTER — OFFICE VISIT (OUTPATIENT)
Dept: SLEEP MEDICINE | Facility: HOSPITAL | Age: 40
End: 2020-06-02

## 2020-06-02 ENCOUNTER — DOCUMENTATION (OUTPATIENT)
Dept: SLEEP MEDICINE | Facility: HOSPITAL | Age: 40
End: 2020-06-02

## 2020-06-02 DIAGNOSIS — G47.33 OBSTRUCTIVE SLEEP APNEA, ADULT: Primary | ICD-10-CM

## 2020-06-02 PROCEDURE — 99213 OFFICE O/P EST LOW 20 MIN: CPT | Performed by: INTERNAL MEDICINE

## 2020-06-02 NOTE — PROGRESS NOTES
Sleep Clinic Telephonic Follow Up    CHIEF COMPLAINT: follow up sleep testing    LAST OV: 03/04/2020    HPI:  The patient is a 40 y.o. male.  I discussed the results of the recent diagnostic polysomnography performed on 05/26/2020.  The AHI/ARMANDO was 26.0.  The patient had a periodic limb movement index of 117.9.  The patient had PVCs as well.  The results of his overnight polysomnography were explained in detail with the patient and he voiced understanding.  We will start the patient on auto CPAP 5 to 20 cm of water and titrate pressures as necessary to maintain adequate residual apnea hypotony index is been advised to call our office if he has any difficulty with compliance or adoption to CPAP.    This visit was conducted by telphone  given the recent COVID-19 pandemic. Consent was given by the patient to conduct this visit by telephone     INTERVAL MEDICAL HISTORY: No change since last office visit in regard to the patient's bedtime routine, medications, or diagnosis.     Bed time relatively unchanged compared with March 4 July 20.      MEDICATIONS:   Current Outpatient Medications:   •  albuterol sulfate  (90 Base) MCG/ACT inhaler, Inhale 2 puffs Every 4 (Four) Hours As Needed for Wheezing., Disp: 1 inhaler, Rfl: 0  •  Calcium Carbonate Antacid 1250 MG/5ML, Take  by mouth., Disp: , Rfl:   •  digoxin (LANOXIN) 250 MCG tablet, Take 1 tablet by mouth Daily., Disp: 30 tablet, Rfl: 1  •  furosemide (LASIX) 40 MG tablet, Take 1 tablet by mouth 2 (Two) Times a Day., Disp: 60 tablet, Rfl: 1  •  lisdexamfetamine (VYVANSE) 70 MG capsule, Take 70 mg by mouth Every Morning Pt takes 1/2 in the morning and 1/2 in the afternoon , Disp: , Rfl:   •  Magnesium Oxide 400 (240 Mg) MG tablet, Take 1 tablet by mouth 2 (Two) Times a Day., Disp: 60 tablet, Rfl: 1  •  metoprolol tartrate (LOPRESSOR) 25 MG tablet, Take 0.5 tablets by mouth Every 12 (Twelve) Hours., Disp: 15 tablet, Rfl: 1  •  pantoprazole (PROTONIX) 20 MG EC tablet,  Take 20 mg by mouth Daily., Disp: , Rfl:   •  sacubitril-valsartan (ENTRESTO) 24-26 MG tablet, Take 1 tablet by mouth Every 12 (Twelve) Hours., Disp: 60 tablet, Rfl: 1  •  spironolactone (ALDACTONE) 25 MG tablet, Take 0.5 tablets by mouth Daily., Disp: 30 tablet, Rfl: 1  •  vitamin D (ERGOCALCIFEROL) 1.25 MG (78153 UT) capsule capsule, Take 50,000 Units by mouth Every 7 (Seven) Days., Disp: , Rfl:   •  warfarin (COUMADIN) 7.5 MG tablet, Take 7.5 mg by mouth Every Night. Takes every 4 days or so, Disp: , Rfl:   •  zolpidem (AMBIEN) 10 MG tablet, Take 10 mg by mouth At Night As Needed for Sleep., Disp: , Rfl:     PHYSICAL EXAM  Vital Signs (last 24 hours)     None        There were no vitals filed for this visit.    Gen:  No acute distress heard in voice, alert, oriented      Assessment and Plan:    1. Obstructive sleep apnea - stable chronic illness and New (to me), additional work-up planned (4)  Script for autocpap 5-20 cm H2O   RTC in 31-90 days with compliance check or sooner for sleep education evaluation if having trouble    The sleep results were discussed in detail with the patient.  The risks of untreated sleep apnea were reviewed.  Treatment options for sleep apnea were discussed. I counseled patient on sleep hygiene, including regular sleep wake schedule and stimulus control therapy. All of the patient's questions were answered. He states understanding and agreement with my assessment and plan as above. I obtained a brief history from the patient, reviewed the medical problems and current medications, and made medical decisions regarding treatment based on that information. Total visit time 15 min, with total of 10 minutes spent counseling patient regarding: PAP therapy and PAP compliance       Patient to follow up in 31-90 days with compliance report   He agrees to return sooner on a prn basis if sx change.           This document has been electronically signed by David Tomlinson MD on June 2, 2020            CC: Joslyn Rousseau, APRN          No ref. provider found

## 2020-06-10 ENCOUNTER — HOSPITAL ENCOUNTER (EMERGENCY)
Facility: HOSPITAL | Age: 40
End: 2020-06-10

## 2020-06-10 ENCOUNTER — HOSPITAL ENCOUNTER (OUTPATIENT)
Dept: ULTRASOUND IMAGING | Facility: HOSPITAL | Age: 40
Discharge: HOME | End: 2020-06-10
Admitting: PHYSICIAN ASSISTANT

## 2020-06-10 PROCEDURE — 76700 US EXAM ABDOM COMPLETE: CPT

## 2020-06-11 ENCOUNTER — OFFICE VISIT (OUTPATIENT)
Dept: GASTROENTEROLOGY | Facility: CLINIC | Age: 40
End: 2020-06-11

## 2020-06-11 VITALS
HEART RATE: 75 BPM | HEIGHT: 71 IN | WEIGHT: 183.6 LBS | BODY MASS INDEX: 25.7 KG/M2 | SYSTOLIC BLOOD PRESSURE: 108 MMHG | DIASTOLIC BLOOD PRESSURE: 68 MMHG

## 2020-06-11 DIAGNOSIS — K21.00 GASTROESOPHAGEAL REFLUX DISEASE WITH ESOPHAGITIS: ICD-10-CM

## 2020-06-11 DIAGNOSIS — R74.8 ELEVATED LIVER ENZYMES: Primary | ICD-10-CM

## 2020-06-11 DIAGNOSIS — K74.00 HEPATIC FIBROSIS: ICD-10-CM

## 2020-06-11 DIAGNOSIS — D50.8 OTHER IRON DEFICIENCY ANEMIA: ICD-10-CM

## 2020-06-11 PROCEDURE — 99213 OFFICE O/P EST LOW 20 MIN: CPT | Performed by: PHYSICIAN ASSISTANT

## 2020-06-11 NOTE — PROGRESS NOTES
Chief Complaint   Patient presents with   • Elevated Hepatic Enzymes   • Anemia   • Heartburn       ENDO PROCEDURE ORDERED:    Subjective    Didier Dahl is a 40 y.o. male. he is here today for follow-up.    History of Present Illness    Patient seen on a recheck of his GERD, anemia, elevated liver enzymes. Last seen 05/14/2020. Patient states he was told by Dr. Tomlinson that he has sleep apnea and is going to be fitted for his device. Currently denies abdominal pain. GERD is doing well on the Protonix. He denied nausea, vomiting, dysphagia. Bowels are moving without blood or mucus. Weight is up 3.6 pounds since last visit. He does have family history of colon cancer but has never had colonoscopy.    Laboratories from 05/14/2020 are reviewed with the patient, showed normal or negative hepatitis diagnostic panel, NICHOLAS, AMA, SMA, ceruloplasmin, alpha-1 antitrypsin, AFP. REYES FibroSURE 0.54/F2, steatosis 0.51/S1-S2, 0.25 N0. . INR 1.17, ferritin 31.8, iron 78 with 12% saturation.    Abdominal ultrasound 06/10/2020 showed normal-appearing liver and gallbladder with a 4.4 mm common bile duct.     ASSESSMENT/PLAN: Patient with significant steatohepatitis with significant fibrosis. Encouraged dietary modification and significant weight loss. I still suspect he has some degree of fatty liver despite the above. It is possible it could be related to some of his medications. At present we will continue current regimen, continue on the Protonix. We will plan followup in 4 months with serum iron studies, CMP, INR prior. Further pending clinical course and the results of the above.       The following portions of the patient's history were reviewed and updated as appropriate:   Past Medical History:   Diagnosis Date   • Anxiety    • Elevated liver enzymes    • GERD (gastroesophageal reflux disease)    • Heart trouble    • Pneumonia      Past Surgical History:   Procedure Laterality Date   • VASCULAR SURGERY        Family History   Problem Relation Age of Onset   • Diabetes Other    • Heart disease Other    • Hypertension Other    • Anxiety disorder Other    • Allergies Other        Allergies   Allergen Reactions   • Chantix [Varenicline] Anaphylaxis     Social History     Socioeconomic History   • Marital status: Single     Spouse name: Not on file   • Number of children: Not on file   • Years of education: Not on file   • Highest education level: Not on file   Tobacco Use   • Smoking status: Former Smoker     Last attempt to quit: 3/1/2020     Years since quittin.3   • Smokeless tobacco: Never Used   Substance and Sexual Activity   • Alcohol use: Yes     Comment: occ   • Drug use: No   • Sexual activity: Defer     Current Medications:  Prior to Admission medications    Medication Sig Start Date End Date Taking? Authorizing Provider   albuterol sulfate  (90 Base) MCG/ACT inhaler Inhale 2 puffs Every 4 (Four) Hours As Needed for Wheezing. 19  Yes Naun Herbert MD   digoxin (LANOXIN) 250 MCG tablet Take 1 tablet by mouth Daily.  Patient taking differently: Take  by mouth Daily. 20  Yes Ricardo Grubbs MD   furosemide (LASIX) 40 MG tablet Take 1 tablet by mouth 2 (Two) Times a Day. 20  Yes Ricardo Grubbs MD   lisdexamfetamine (VYVANSE) 70 MG capsule Take 70 mg by mouth Every Morning Pt takes 1/2 in the morning and 1/2 in the afternoon    Yes Lena Cabrera MD   Magnesium Oxide 400 (240 Mg) MG tablet Take 1 tablet by mouth 2 (Two) Times a Day. 20  Yes Ricardo Grubbs MD   metoprolol tartrate (LOPRESSOR) 25 MG tablet Take 0.5 tablets by mouth Every 12 (Twelve) Hours. 20  Yes Ricardo Grubbs MD   pantoprazole (PROTONIX) 20 MG EC tablet Take 20 mg by mouth Daily. 3/3/20  Yes Lena Cabrera MD   sacubitril-valsartan (ENTRESTO) 24-26 MG tablet Take 1 tablet by mouth Every 12 (Twelve) Hours. 20  Yes Ricardo Grubbs MD   spironolactone (ALDACTONE) 25 MG  "tablet Take 0.5 tablets by mouth Daily. 2/9/20  Yes Ricardo Grubbs MD   zolpidem (AMBIEN) 10 MG tablet Take 10 mg by mouth At Night As Needed for Sleep.   Yes Lena Cabrera MD   Calcium Carbonate Antacid 1250 MG/5ML Take  by mouth.    Lena Cabrera MD   vitamin D (ERGOCALCIFEROL) 1.25 MG (46683 UT) capsule capsule Take 50,000 Units by mouth Every 7 (Seven) Days.    Lena Cabrera MD   warfarin (COUMADIN) 7.5 MG tablet Take 7.5 mg by mouth Every Night. Takes every 4 days or so    Lena Cabrera MD     Review of Systems  Review of Systems       Objective    /68 (BP Location: Left arm)   Pulse 75   Ht 180.3 cm (71\")   Wt 83.3 kg (183 lb 9.6 oz)   BMI 25.61 kg/m²   Physical Exam   Constitutional: He is oriented to person, place, and time. He appears well-developed and well-nourished. No distress.   HENT:   Head: Normocephalic and atraumatic.   Eyes: Pupils are equal, round, and reactive to light. EOM are normal.   Neck: Normal range of motion.   Cardiovascular: Normal rate and regular rhythm.   Irregular heart sounds   Pulmonary/Chest: Effort normal and breath sounds normal.   Abdominal: Soft. Bowel sounds are normal. He exhibits no shifting dullness, no distension, no abdominal bruit, no ascites and no mass. There is no hepatosplenomegaly. There is tenderness. There is no rigidity, no rebound, no guarding and no CVA tenderness. No hernia. Hernia confirmed negative in the ventral area.   mild   Musculoskeletal: Normal range of motion.   Neurological: He is alert and oriented to person, place, and time.   Skin: Skin is warm and dry.   Psychiatric: He has a normal mood and affect. His behavior is normal. Judgment and thought content normal.   Nursing note and vitals reviewed.    Assessment/Plan      1. Elevated liver enzymes    2. Other iron deficiency anemia    3. Gastroesophageal reflux disease with esophagitis    4. Hepatic fibrosis    .   Didier was seen today for elevated " hepatic enzymes, anemia and heartburn.    Diagnoses and all orders for this visit:    Elevated liver enzymes  -     Protime-INR; Future  -     Comprehensive Metabolic Panel; Future  -     Ferritin; Future  -     Iron Profile; Future    Other iron deficiency anemia  -     Protime-INR; Future  -     Comprehensive Metabolic Panel; Future  -     Ferritin; Future  -     Iron Profile; Future    Gastroesophageal reflux disease with esophagitis  -     Protime-INR; Future  -     Comprehensive Metabolic Panel; Future  -     Ferritin; Future  -     Iron Profile; Future    Hepatic fibrosis  -     Protime-INR; Future  -     Comprehensive Metabolic Panel; Future  -     Ferritin; Future  -     Iron Profile; Future        Orders placed during this encounter include:  Orders Placed This Encounter   Procedures   • Protime-INR     Standing Status:   Future     Standing Expiration Date:   12/8/2020   • Comprehensive Metabolic Panel     Standing Status:   Future     Standing Expiration Date:   12/8/2020   • Ferritin     Standing Status:   Future     Standing Expiration Date:   12/8/2020   • Iron Profile     Standing Status:   Future     Standing Expiration Date:   12/8/2020       Medications prescribed:  No orders of the defined types were placed in this encounter.      Requested Prescriptions      No prescriptions requested or ordered in this encounter       Review and/or summary of lab tests, radiology, procedures, medications. Review and summary of old records and obtaining of history. The risks and benefits of my recommendations, as well as other treatment options were discussed with the patient today. Questions were answered.    Follow-up: Return in about 4 months (around 10/11/2020), or if symptoms worsen or fail to improve, for lab prior.     * Surgery not found *      This document has been electronically signed by Alejandro Hughes PA-C on June 19, 2020 14:40      Results for orders placed or performed in visit on 03/11/20    REYES Fibrosure   Result Value Ref Range    Fibrosis Score (References) 0.54 (H) 0.00 - 0.21    Fibrosis Stage (Reference) Comment     Steatosis Score (Reference) 0.51 (H) 0.00 - 0.30    Steatosis Grade (Reference) Comment     REYES Score (Reference) 0.25 0.25    Reyes Grade (Reference) Comment     Height: (Reference) 71 in    Weight: (Reference) 179 LBS    Alpha 2-Macroglobulins, Qn 197 110 - 276 mg/dL    Haptoglobin 120 17 - 317 mg/dL    Apolipoprotein A-1 113 101 - 178 mg/dL    Total Bilirubin 1.1 0.0 - 1.2 mg/dL     (H) 0 - 65 IU/L    ALT (SGPT) 37 0 - 55 IU/L    AST (SGOT) P5P (Reference) 34 0 - 40 IU/L    Cholesterol, Total (Reference) 147 100 - 199 mg/dL    Glucose, Serum (Reference) 84 65 - 99 mg/dL    Triglycerides 87 0 - 149 mg/dL    Interpretations: (Reference) Comment     Fibrosis Scoring: Comment     Steatosis Grading (Reference) Comment     Reyes Scoring (Reference) Comment     Limitations: (Reference) Comment     Comment (Reference) Comment    Hepatitis Panel, Acute   Result Value Ref Range    Hepatitis B Surface Ag Non-Reactive Non-Reactive    Hep A IgM Non-Reactive Non-Reactive    Hep B C IgM Non-Reactive Non-Reactive    Hepatitis C Ab Non-Reactive Non-Reactive   Hep B Confirmation Tube   Result Value Ref Range    Extra Tube Hold for add-ons.    Nuclear Antigen Antibody, IFA   Result Value Ref Range    NICHOLAS Negative    Iron and TIBC   Result Value Ref Range    Iron 78 59 - 158 mcg/dL    Iron Saturation 12 (L) 20 - 50 %    Transferrin 425 (H) 200 - 360 mg/dL    TIBC 633 (H) 298 - 536 mcg/dL   Alpha - 1 - Antitrypsin   Result Value Ref Range    ALPHA -1 ANTITRYPSIN 152 90 - 200 mg/dL   Mitochondrial Antibodies, M2   Result Value Ref Range    Mitochondrial Ab <20.0 0.0 - 20.0 Units   Ceruloplasmin   Result Value Ref Range    Ceruloplasmin 31 16 - 31 mg/dL   AFP Tumor Marker   Result Value Ref Range    ALPHA-FETOPROTEIN 5.00 0 - 8.3 ng/mL   Anti-Smooth Muscle Antibody Titer   Result Value Ref Range     Smooth Muscle Ab 5 0 - 19 Units   Protime-INR   Result Value Ref Range    Protime 14.7 11.1 - 15.3 Seconds    INR 1.17 0.80 - 1.20   Ferritin   Result Value Ref Range    Ferritin 31.80 30.00 - 400.00 ng/mL   Results for orders placed or performed during the hospital encounter of 02/05/20   Gold Top - SST   Result Value Ref Range    Extra Tube Hold for add-ons.    Green Top (Gel)   Result Value Ref Range    Extra Tube Hold for add-ons.    Green Top (Gel)   Result Value Ref Range    Extra Tube Hold for add-ons.    CBC Auto Differential   Result Value Ref Range    WBC 6.69 3.40 - 10.80 10*3/mm3    RBC 4.60 4.14 - 5.80 10*6/mm3    Hemoglobin 13.3 13.0 - 17.7 g/dL    Hematocrit 40.7 37.5 - 51.0 %    MCV 88.5 79.0 - 97.0 fL    MCH 28.9 26.6 - 33.0 pg    MCHC 32.7 31.5 - 35.7 g/dL    RDW 14.0 12.3 - 15.4 %    RDW-SD 44.9 37.0 - 54.0 fl    MPV 10.8 6.0 - 12.0 fL    Platelets 211 140 - 450 10*3/mm3    Neutrophil % 46.3 42.7 - 76.0 %    Lymphocyte % 38.1 19.6 - 45.3 %    Monocyte % 10.5 5.0 - 12.0 %    Eosinophil % 4.0 0.3 - 6.2 %    Basophil % 1.0 0.0 - 1.5 %    Immature Grans % 0.1 0.0 - 0.5 %    Neutrophils, Absolute 3.09 1.70 - 7.00 10*3/mm3    Lymphocytes, Absolute 2.55 0.70 - 3.10 10*3/mm3    Monocytes, Absolute 0.70 0.10 - 0.90 10*3/mm3    Eosinophils, Absolute 0.27 0.00 - 0.40 10*3/mm3    Basophils, Absolute 0.07 0.00 - 0.20 10*3/mm3    Immature Grans, Absolute 0.01 0.00 - 0.05 10*3/mm3    nRBC 0.0 0.0 - 0.2 /100 WBC   CBC Auto Differential   Result Value Ref Range    WBC 8.02 3.40 - 10.80 10*3/mm3    RBC 4.46 4.14 - 5.80 10*6/mm3    Hemoglobin 12.8 (L) 13.0 - 17.7 g/dL    Hematocrit 39.0 37.5 - 51.0 %    MCV 87.4 79.0 - 97.0 fL    MCH 28.7 26.6 - 33.0 pg    MCHC 32.8 31.5 - 35.7 g/dL    RDW 13.9 12.3 - 15.4 %    RDW-SD 44.7 37.0 - 54.0 fl    MPV 10.6 6.0 - 12.0 fL    Platelets 226 140 - 450 10*3/mm3    Neutrophil % 58.2 42.7 - 76.0 %    Lymphocyte % 30.9 19.6 - 45.3 %    Monocyte % 8.7 5.0 - 12.0 %    Eosinophil %  1.5 0.3 - 6.2 %    Basophil % 0.5 0.0 - 1.5 %    Immature Grans % 0.2 0.0 - 0.5 %    Neutrophils, Absolute 4.66 1.70 - 7.00 10*3/mm3    Lymphocytes, Absolute 2.48 0.70 - 3.10 10*3/mm3    Monocytes, Absolute 0.70 0.10 - 0.90 10*3/mm3    Eosinophils, Absolute 0.12 0.00 - 0.40 10*3/mm3    Basophils, Absolute 0.04 0.00 - 0.20 10*3/mm3    Immature Grans, Absolute 0.02 0.00 - 0.05 10*3/mm3    nRBC 0.0 0.0 - 0.2 /100 WBC   Lavender Top   Result Value Ref Range    Extra Tube hold for add-on    Lavender Top   Result Value Ref Range    Extra Tube hold for add-on    Light Blue Top   Result Value Ref Range    Extra Tube hold for add-on    Troponin   Result Value Ref Range    Troponin T <0.010 0.000 - 0.030 ng/mL   Protime-INR   Result Value Ref Range    Protime 22.5 (H) 11.1 - 15.3 Seconds    INR 2.01 (H) 0.80 - 1.20   Protime-INR   Result Value Ref Range    Protime 20.8 (H) 11.1 - 15.3 Seconds    INR 1.82 (H) 0.80 - 1.20     *Note: Due to a large number of results and/or encounters for the requested time period, some results have not been displayed. A complete set of results can be found in Results Review.       Some portions of this note have been dictated using voice recognition software and may contain errors and/or omissions.

## 2020-06-11 NOTE — PATIENT INSTRUCTIONS

## 2020-07-10 DIAGNOSIS — I42.8 NON-ISCHEMIC CARDIOMYOPATHY (HCC): Primary | ICD-10-CM

## 2020-07-23 ENCOUNTER — APPOINTMENT (OUTPATIENT)
Dept: CT IMAGING | Facility: HOSPITAL | Age: 40
End: 2020-07-23

## 2020-07-23 ENCOUNTER — HOSPITAL ENCOUNTER (INPATIENT)
Facility: HOSPITAL | Age: 40
LOS: 6 days | Discharge: HOME | End: 2020-07-29
Attending: FAMILY MEDICINE | Admitting: FAMILY MEDICINE

## 2020-07-23 ENCOUNTER — APPOINTMENT (OUTPATIENT)
Dept: GENERAL RADIOLOGY | Facility: HOSPITAL | Age: 40
End: 2020-07-23

## 2020-07-23 DIAGNOSIS — Z95.0 STATUS POST PLACEMENT OF CARDIAC PACEMAKER: ICD-10-CM

## 2020-07-23 DIAGNOSIS — I42.8 NON-ISCHEMIC CARDIOMYOPATHY (HCC): ICD-10-CM

## 2020-07-23 DIAGNOSIS — I50.22 CHRONIC SYSTOLIC CONGESTIVE HEART FAILURE (HCC): ICD-10-CM

## 2020-07-23 DIAGNOSIS — I50.9 ACUTE ON CHRONIC CONGESTIVE HEART FAILURE, UNSPECIFIED HEART FAILURE TYPE (HCC): Primary | ICD-10-CM

## 2020-07-23 DIAGNOSIS — R07.9 CHEST PAIN, UNSPECIFIED TYPE: ICD-10-CM

## 2020-07-23 LAB
ALBUMIN SERPL-MCNC: 4.3 G/DL (ref 3.5–5.2)
ALBUMIN/GLOB SERPL: 1.7 G/DL
ALP SERPL-CCNC: 66 U/L (ref 39–117)
ALT SERPL W P-5'-P-CCNC: 42 U/L (ref 1–41)
ANION GAP SERPL CALCULATED.3IONS-SCNC: 11 MMOL/L (ref 5–15)
AST SERPL-CCNC: 44 U/L (ref 1–40)
BASOPHILS # BLD AUTO: 0.04 10*3/MM3 (ref 0–0.2)
BASOPHILS NFR BLD AUTO: 0.7 % (ref 0–1.5)
BILIRUB SERPL-MCNC: 0.9 MG/DL (ref 0–1.2)
BUN SERPL-MCNC: 25 MG/DL (ref 6–20)
BUN/CREAT SERPL: 28.1 (ref 7–25)
CALCIUM SPEC-SCNC: 9.3 MG/DL (ref 8.6–10.5)
CHLORIDE SERPL-SCNC: 101 MMOL/L (ref 98–107)
CO2 SERPL-SCNC: 22 MMOL/L (ref 22–29)
CREAT SERPL-MCNC: 0.89 MG/DL (ref 0.76–1.27)
D-DIMER, QUANTITATIVE (MAD,POW, STR): 924 NG/ML (FEU) (ref 0–470)
DEPRECATED RDW RBC AUTO: 50.5 FL (ref 37–54)
DIGOXIN SERPL-MCNC: 0.8 NG/ML (ref 0.6–1.2)
EOSINOPHIL # BLD AUTO: 0.16 10*3/MM3 (ref 0–0.4)
EOSINOPHIL NFR BLD AUTO: 2.7 % (ref 0.3–6.2)
ERYTHROCYTE [DISTWIDTH] IN BLOOD BY AUTOMATED COUNT: 15.5 % (ref 12.3–15.4)
GFR SERPL CREATININE-BSD FRML MDRD: 96 ML/MIN/1.73
GLOBULIN UR ELPH-MCNC: 2.5 GM/DL
GLUCOSE SERPL-MCNC: 157 MG/DL (ref 65–99)
HCT VFR BLD AUTO: 42.7 % (ref 37.5–51)
HGB BLD-MCNC: 13.7 G/DL (ref 13–17.7)
HOLD SPECIMEN: NORMAL
HOLD SPECIMEN: NORMAL
IMM GRANULOCYTES # BLD AUTO: 0.01 10*3/MM3 (ref 0–0.05)
IMM GRANULOCYTES NFR BLD AUTO: 0.2 % (ref 0–0.5)
INR PPP: 1.18 (ref 0.8–1.2)
LYMPHOCYTES # BLD AUTO: 1.57 10*3/MM3 (ref 0.7–3.1)
LYMPHOCYTES NFR BLD AUTO: 26.2 % (ref 19.6–45.3)
MCH RBC QN AUTO: 28.8 PG (ref 26.6–33)
MCHC RBC AUTO-ENTMCNC: 32.1 G/DL (ref 31.5–35.7)
MCV RBC AUTO: 89.9 FL (ref 79–97)
MONOCYTES # BLD AUTO: 0.52 10*3/MM3 (ref 0.1–0.9)
MONOCYTES NFR BLD AUTO: 8.7 % (ref 5–12)
NEUTROPHILS NFR BLD AUTO: 3.7 10*3/MM3 (ref 1.7–7)
NEUTROPHILS NFR BLD AUTO: 61.5 % (ref 42.7–76)
NRBC BLD AUTO-RTO: 0 /100 WBC (ref 0–0.2)
NT-PROBNP SERPL-MCNC: 3266 PG/ML (ref 0–450)
PLATELET # BLD AUTO: 183 10*3/MM3 (ref 140–450)
PMV BLD AUTO: 10.9 FL (ref 6–12)
POTASSIUM SERPL-SCNC: 5.6 MMOL/L (ref 3.5–5.2)
PROT SERPL-MCNC: 6.8 G/DL (ref 6–8.5)
PROTHROMBIN TIME: 15.6 SECONDS (ref 11.1–15.3)
RBC # BLD AUTO: 4.75 10*6/MM3 (ref 4.14–5.8)
SODIUM SERPL-SCNC: 134 MMOL/L (ref 136–145)
TROPONIN T SERPL-MCNC: <0.01 NG/ML (ref 0–0.03)
TROPONIN T SERPL-MCNC: <0.01 NG/ML (ref 0–0.03)
WBC # BLD AUTO: 6 10*3/MM3 (ref 3.4–10.8)
WHOLE BLOOD HOLD SPECIMEN: NORMAL
WHOLE BLOOD HOLD SPECIMEN: NORMAL

## 2020-07-23 PROCEDURE — 84484 ASSAY OF TROPONIN QUANT: CPT | Performed by: PHYSICIAN ASSISTANT

## 2020-07-23 PROCEDURE — 71045 X-RAY EXAM CHEST 1 VIEW: CPT

## 2020-07-23 PROCEDURE — 93005 ELECTROCARDIOGRAM TRACING: CPT

## 2020-07-23 PROCEDURE — 80162 ASSAY OF DIGOXIN TOTAL: CPT | Performed by: PHYSICIAN ASSISTANT

## 2020-07-23 PROCEDURE — 71275 CT ANGIOGRAPHY CHEST: CPT

## 2020-07-23 PROCEDURE — 0 IOPAMIDOL PER 1 ML: Performed by: EMERGENCY MEDICINE

## 2020-07-23 PROCEDURE — G0378 HOSPITAL OBSERVATION PER HR: HCPCS

## 2020-07-23 PROCEDURE — 80053 COMPREHEN METABOLIC PANEL: CPT | Performed by: PHYSICIAN ASSISTANT

## 2020-07-23 PROCEDURE — 93010 ELECTROCARDIOGRAM REPORT: CPT | Performed by: INTERNAL MEDICINE

## 2020-07-23 PROCEDURE — 93005 ELECTROCARDIOGRAM TRACING: CPT | Performed by: FAMILY MEDICINE

## 2020-07-23 PROCEDURE — 85025 COMPLETE CBC W/AUTO DIFF WBC: CPT | Performed by: PHYSICIAN ASSISTANT

## 2020-07-23 PROCEDURE — 25010000002 FUROSEMIDE PER 20 MG: Performed by: HOSPITALIST

## 2020-07-23 PROCEDURE — 83880 ASSAY OF NATRIURETIC PEPTIDE: CPT | Performed by: PHYSICIAN ASSISTANT

## 2020-07-23 PROCEDURE — 85379 FIBRIN DEGRADATION QUANT: CPT | Performed by: PHYSICIAN ASSISTANT

## 2020-07-23 PROCEDURE — 85610 PROTHROMBIN TIME: CPT | Performed by: PHYSICIAN ASSISTANT

## 2020-07-23 PROCEDURE — 99284 EMERGENCY DEPT VISIT MOD MDM: CPT

## 2020-07-23 PROCEDURE — 36415 COLL VENOUS BLD VENIPUNCTURE: CPT | Performed by: PHYSICIAN ASSISTANT

## 2020-07-23 PROCEDURE — 25010000002 FUROSEMIDE PER 20 MG: Performed by: INTERNAL MEDICINE

## 2020-07-23 RX ORDER — FUROSEMIDE 40 MG/1
40 TABLET ORAL 2 TIMES DAILY
Status: DISCONTINUED | OUTPATIENT
Start: 2020-07-23 | End: 2020-07-25

## 2020-07-23 RX ORDER — CALCIUM CARBONATE 200(500)MG
2 TABLET,CHEWABLE ORAL DAILY
Status: DISCONTINUED | OUTPATIENT
Start: 2020-07-24 | End: 2020-07-29 | Stop reason: HOSPADM

## 2020-07-23 RX ORDER — DIGOXIN 250 MCG
250 TABLET ORAL
Status: DISCONTINUED | OUTPATIENT
Start: 2020-07-24 | End: 2020-07-29 | Stop reason: HOSPADM

## 2020-07-23 RX ORDER — FUROSEMIDE 10 MG/ML
40 INJECTION INTRAMUSCULAR; INTRAVENOUS DAILY
Status: DISCONTINUED | OUTPATIENT
Start: 2020-07-23 | End: 2020-07-29 | Stop reason: HOSPADM

## 2020-07-23 RX ORDER — WARFARIN SODIUM 5 MG/1
7.5 TABLET ORAL NIGHTLY
COMMUNITY
End: 2021-07-14

## 2020-07-23 RX ORDER — PANTOPRAZOLE SODIUM 20 MG/1
20 TABLET, DELAYED RELEASE ORAL DAILY
Status: DISCONTINUED | OUTPATIENT
Start: 2020-07-23 | End: 2020-07-29 | Stop reason: HOSPADM

## 2020-07-23 RX ORDER — WARFARIN SODIUM 7.5 MG/1
7.5 TABLET ORAL NIGHTLY
Status: DISCONTINUED | OUTPATIENT
Start: 2020-07-23 | End: 2020-07-23

## 2020-07-23 RX ORDER — CALCIUM CARBONATE 500(1250)
1000 TABLET ORAL DAILY
Status: DISCONTINUED | OUTPATIENT
Start: 2020-07-23 | End: 2020-07-23

## 2020-07-23 RX ORDER — ALBUTEROL SULFATE 2.5 MG/3ML
2.5 SOLUTION RESPIRATORY (INHALATION) EVERY 4 HOURS PRN
Status: DISCONTINUED | OUTPATIENT
Start: 2020-07-23 | End: 2020-07-29 | Stop reason: HOSPADM

## 2020-07-23 RX ORDER — SODIUM CHLORIDE 0.9 % (FLUSH) 0.9 %
10 SYRINGE (ML) INJECTION EVERY 12 HOURS SCHEDULED
Status: DISCONTINUED | OUTPATIENT
Start: 2020-07-23 | End: 2020-07-29 | Stop reason: HOSPADM

## 2020-07-23 RX ORDER — ZOLPIDEM TARTRATE 5 MG/1
10 TABLET ORAL NIGHTLY PRN
Status: DISCONTINUED | OUTPATIENT
Start: 2020-07-23 | End: 2020-07-29 | Stop reason: HOSPADM

## 2020-07-23 RX ORDER — LANOLIN ALCOHOL/MO/W.PET/CERES
400 CREAM (GRAM) TOPICAL 2 TIMES DAILY
Status: DISCONTINUED | OUTPATIENT
Start: 2020-07-23 | End: 2020-07-29 | Stop reason: HOSPADM

## 2020-07-23 RX ORDER — SODIUM CHLORIDE 0.9 % (FLUSH) 0.9 %
10 SYRINGE (ML) INJECTION AS NEEDED
Status: DISCONTINUED | OUTPATIENT
Start: 2020-07-23 | End: 2020-07-29 | Stop reason: HOSPADM

## 2020-07-23 RX ORDER — WARFARIN SODIUM 10 MG/1
10 TABLET ORAL
Status: COMPLETED | OUTPATIENT
Start: 2020-07-23 | End: 2020-07-23

## 2020-07-23 RX ORDER — WARFARIN SODIUM 5 MG/1
10 TABLET ORAL NIGHTLY
COMMUNITY
End: 2021-07-14

## 2020-07-23 RX ADMIN — WARFARIN SODIUM 10 MG: 10 TABLET ORAL at 22:42

## 2020-07-23 RX ADMIN — PANTOPRAZOLE SODIUM 20 MG: 20 TABLET, DELAYED RELEASE ORAL at 22:41

## 2020-07-23 RX ADMIN — METOPROLOL TARTRATE 12.5 MG: 25 TABLET, FILM COATED ORAL at 22:41

## 2020-07-23 RX ADMIN — IOPAMIDOL 90 ML: 755 INJECTION, SOLUTION INTRAVENOUS at 16:35

## 2020-07-23 RX ADMIN — SODIUM CHLORIDE, PRESERVATIVE FREE 10 ML: 5 INJECTION INTRAVENOUS at 22:45

## 2020-07-23 RX ADMIN — SACUBITRIL AND VALSARTAN 1 TABLET: 24; 26 TABLET, FILM COATED ORAL at 22:42

## 2020-07-23 RX ADMIN — FUROSEMIDE 40 MG: 10 INJECTION, SOLUTION INTRAMUSCULAR; INTRAVENOUS at 22:43

## 2020-07-23 RX ADMIN — MAGNESIUM OXIDE 400 MG (241.3 MG MAGNESIUM) TABLET 400 MG: TABLET at 22:42

## 2020-07-24 ENCOUNTER — APPOINTMENT (OUTPATIENT)
Dept: CARDIOLOGY | Facility: HOSPITAL | Age: 40
End: 2020-07-24

## 2020-07-24 LAB
ANION GAP SERPL CALCULATED.3IONS-SCNC: 9 MMOL/L (ref 5–15)
BASOPHILS # BLD AUTO: 0.04 10*3/MM3 (ref 0–0.2)
BASOPHILS NFR BLD AUTO: 0.8 % (ref 0–1.5)
BH CV ECHO MEAS - BSA(HAYCOCK): 2.1 M^2
BH CV ECHO MEAS - BSA: 2.1 M^2
BH CV ECHO MEAS - BZI_BMI: 26.2 KILOGRAMS/M^2
BH CV ECHO MEAS - BZI_METRIC_HEIGHT: 180.3 CM
BH CV ECHO MEAS - BZI_METRIC_WEIGHT: 85.3 KG
BH CV ECHO MEAS - EDV(CUBED): 403.6 ML
BH CV ECHO MEAS - EDV(MOD-SP2): 325 ML
BH CV ECHO MEAS - EDV(MOD-SP4): 276 ML
BH CV ECHO MEAS - EDV(TEICH): 288.6 ML
BH CV ECHO MEAS - EF(CUBED): 9.4 %
BH CV ECHO MEAS - EF(MOD-SP2): 13.5 %
BH CV ECHO MEAS - EF(MOD-SP4): 5.8 %
BH CV ECHO MEAS - EF(TEICH): 7.2 %
BH CV ECHO MEAS - ESV(CUBED): 365.5 ML
BH CV ECHO MEAS - ESV(MOD-SP2): 281 ML
BH CV ECHO MEAS - ESV(MOD-SP4): 260 ML
BH CV ECHO MEAS - ESV(TEICH): 267.9 ML
BH CV ECHO MEAS - FS: 3.2 %
BH CV ECHO MEAS - IVS/LVPW: 0.81
BH CV ECHO MEAS - IVSD: 0.92 CM
BH CV ECHO MEAS - LA DIMENSION: 6.3 CM
BH CV ECHO MEAS - LV DIASTOLIC VOL/BSA (35-75): 134.4 ML/M^2
BH CV ECHO MEAS - LV MASS(C)D: 364.1 GRAMS
BH CV ECHO MEAS - LV MASS(C)DI: 177.2 GRAMS/M^2
BH CV ECHO MEAS - LV SYSTOLIC VOL/BSA (12-30): 126.6 ML/M^2
BH CV ECHO MEAS - LVIDD: 7.4 CM
BH CV ECHO MEAS - LVIDS: 7.2 CM
BH CV ECHO MEAS - LVLD AP2: 9.7 CM
BH CV ECHO MEAS - LVLD AP4: 10 CM
BH CV ECHO MEAS - LVLS AP2: 9.6 CM
BH CV ECHO MEAS - LVLS AP4: 9.6 CM
BH CV ECHO MEAS - LVPWD: 1.1 CM
BH CV ECHO MEAS - RVDD: 4.3 CM
BH CV ECHO MEAS - SI(CUBED): 18.5 ML/M^2
BH CV ECHO MEAS - SI(MOD-SP2): 21.4 ML/M^2
BH CV ECHO MEAS - SI(MOD-SP4): 7.8 ML/M^2
BH CV ECHO MEAS - SI(TEICH): 10 ML/M^2
BH CV ECHO MEAS - SV(CUBED): 38.1 ML
BH CV ECHO MEAS - SV(MOD-SP2): 44 ML
BH CV ECHO MEAS - SV(MOD-SP4): 16 ML
BH CV ECHO MEAS - SV(TEICH): 20.6 ML
BH CV ECHO MEAS - TR MAX VEL: 257 CM/SEC
BUN SERPL-MCNC: 23 MG/DL (ref 6–20)
BUN/CREAT SERPL: 25 (ref 7–25)
CALCIUM SPEC-SCNC: 9 MG/DL (ref 8.6–10.5)
CHLORIDE SERPL-SCNC: 103 MMOL/L (ref 98–107)
CO2 SERPL-SCNC: 25 MMOL/L (ref 22–29)
CREAT SERPL-MCNC: 0.92 MG/DL (ref 0.76–1.27)
DEPRECATED RDW RBC AUTO: 49.6 FL (ref 37–54)
EOSINOPHIL # BLD AUTO: 0.13 10*3/MM3 (ref 0–0.4)
EOSINOPHIL NFR BLD AUTO: 2.5 % (ref 0.3–6.2)
ERYTHROCYTE [DISTWIDTH] IN BLOOD BY AUTOMATED COUNT: 15.6 % (ref 12.3–15.4)
GFR SERPL CREATININE-BSD FRML MDRD: 92 ML/MIN/1.73
GLUCOSE SERPL-MCNC: 91 MG/DL (ref 65–99)
HCT VFR BLD AUTO: 36.7 % (ref 37.5–51)
HGB BLD-MCNC: 12.2 G/DL (ref 13–17.7)
IMM GRANULOCYTES # BLD AUTO: 0.01 10*3/MM3 (ref 0–0.05)
IMM GRANULOCYTES NFR BLD AUTO: 0.2 % (ref 0–0.5)
INR PPP: 1.35 (ref 0.8–1.2)
LYMPHOCYTES # BLD AUTO: 1.55 10*3/MM3 (ref 0.7–3.1)
LYMPHOCYTES NFR BLD AUTO: 30.2 % (ref 19.6–45.3)
MCH RBC QN AUTO: 29.3 PG (ref 26.6–33)
MCHC RBC AUTO-ENTMCNC: 33.2 G/DL (ref 31.5–35.7)
MCV RBC AUTO: 88 FL (ref 79–97)
MONOCYTES # BLD AUTO: 0.5 10*3/MM3 (ref 0.1–0.9)
MONOCYTES NFR BLD AUTO: 9.7 % (ref 5–12)
NEUTROPHILS NFR BLD AUTO: 2.9 10*3/MM3 (ref 1.7–7)
NEUTROPHILS NFR BLD AUTO: 56.6 % (ref 42.7–76)
NRBC BLD AUTO-RTO: 0 /100 WBC (ref 0–0.2)
PLATELET # BLD AUTO: 167 10*3/MM3 (ref 140–450)
PMV BLD AUTO: 10.4 FL (ref 6–12)
POTASSIUM SERPL-SCNC: 4.7 MMOL/L (ref 3.5–5.2)
PROTHROMBIN TIME: 17.4 SECONDS (ref 11.1–15.3)
RBC # BLD AUTO: 4.17 10*6/MM3 (ref 4.14–5.8)
SODIUM SERPL-SCNC: 137 MMOL/L (ref 136–145)
WBC # BLD AUTO: 5.13 10*3/MM3 (ref 3.4–10.8)

## 2020-07-24 PROCEDURE — 93325 DOPPLER ECHO COLOR FLOW MAPG: CPT

## 2020-07-24 PROCEDURE — 93321 DOPPLER ECHO F-UP/LMTD STD: CPT

## 2020-07-24 PROCEDURE — G0378 HOSPITAL OBSERVATION PER HR: HCPCS

## 2020-07-24 PROCEDURE — 80048 BASIC METABOLIC PNL TOTAL CA: CPT | Performed by: HOSPITALIST

## 2020-07-24 PROCEDURE — 93308 TTE F-UP OR LMTD: CPT

## 2020-07-24 PROCEDURE — 85025 COMPLETE CBC W/AUTO DIFF WBC: CPT | Performed by: HOSPITALIST

## 2020-07-24 PROCEDURE — 85610 PROTHROMBIN TIME: CPT | Performed by: HOSPITALIST

## 2020-07-24 RX ORDER — WARFARIN SODIUM 10 MG/1
10 TABLET ORAL
Status: COMPLETED | OUTPATIENT
Start: 2020-07-24 | End: 2020-07-24

## 2020-07-24 RX ADMIN — FUROSEMIDE 40 MG: 40 TABLET ORAL at 09:36

## 2020-07-24 RX ADMIN — SACUBITRIL AND VALSARTAN 1 TABLET: 24; 26 TABLET, FILM COATED ORAL at 20:40

## 2020-07-24 RX ADMIN — WARFARIN SODIUM 10 MG: 10 TABLET ORAL at 19:55

## 2020-07-24 RX ADMIN — MAGNESIUM OXIDE 400 MG (241.3 MG MAGNESIUM) TABLET 400 MG: TABLET at 09:36

## 2020-07-24 RX ADMIN — PANTOPRAZOLE SODIUM 20 MG: 20 TABLET, DELAYED RELEASE ORAL at 09:38

## 2020-07-24 RX ADMIN — METOPROLOL TARTRATE 12.5 MG: 25 TABLET, FILM COATED ORAL at 22:02

## 2020-07-24 RX ADMIN — DIGOXIN 250 MCG: 250 TABLET ORAL at 12:42

## 2020-07-24 RX ADMIN — MAGNESIUM OXIDE 400 MG (241.3 MG MAGNESIUM) TABLET 400 MG: TABLET at 20:00

## 2020-07-24 RX ADMIN — CALCIUM CARBONATE (ANTACID) CHEW TAB 500 MG 2 TABLET: 500 CHEW TAB at 09:36

## 2020-07-24 RX ADMIN — SODIUM CHLORIDE, PRESERVATIVE FREE 10 ML: 5 INJECTION INTRAVENOUS at 20:00

## 2020-07-24 RX ADMIN — FUROSEMIDE 40 MG: 40 TABLET ORAL at 19:57

## 2020-07-24 RX ADMIN — METOPROLOL TARTRATE 12.5 MG: 25 TABLET, FILM COATED ORAL at 09:36

## 2020-07-24 RX ADMIN — Medication 12.5 MG: at 09:36

## 2020-07-24 RX ADMIN — SACUBITRIL AND VALSARTAN 1 TABLET: 24; 26 TABLET, FILM COATED ORAL at 09:36

## 2020-07-25 LAB
ANION GAP SERPL CALCULATED.3IONS-SCNC: 12 MMOL/L (ref 5–15)
BASOPHILS # BLD AUTO: 0.05 10*3/MM3 (ref 0–0.2)
BASOPHILS NFR BLD AUTO: 1 % (ref 0–1.5)
BUN SERPL-MCNC: 25 MG/DL (ref 6–20)
BUN/CREAT SERPL: 26.9 (ref 7–25)
CALCIUM SPEC-SCNC: 8.8 MG/DL (ref 8.6–10.5)
CHLORIDE SERPL-SCNC: 101 MMOL/L (ref 98–107)
CO2 SERPL-SCNC: 24 MMOL/L (ref 22–29)
CREAT SERPL-MCNC: 0.93 MG/DL (ref 0.76–1.27)
DEPRECATED RDW RBC AUTO: 49.6 FL (ref 37–54)
EOSINOPHIL # BLD AUTO: 0.16 10*3/MM3 (ref 0–0.4)
EOSINOPHIL NFR BLD AUTO: 3.3 % (ref 0.3–6.2)
ERYTHROCYTE [DISTWIDTH] IN BLOOD BY AUTOMATED COUNT: 15.5 % (ref 12.3–15.4)
GFR SERPL CREATININE-BSD FRML MDRD: 91 ML/MIN/1.73
GLUCOSE SERPL-MCNC: 110 MG/DL (ref 65–99)
HCT VFR BLD AUTO: 38.9 % (ref 37.5–51)
HGB BLD-MCNC: 12.7 G/DL (ref 13–17.7)
IMM GRANULOCYTES # BLD AUTO: 0.01 10*3/MM3 (ref 0–0.05)
IMM GRANULOCYTES NFR BLD AUTO: 0.2 % (ref 0–0.5)
INR PPP: 1.66 (ref 0.8–1.2)
LYMPHOCYTES # BLD AUTO: 1.57 10*3/MM3 (ref 0.7–3.1)
LYMPHOCYTES NFR BLD AUTO: 32.4 % (ref 19.6–45.3)
MCH RBC QN AUTO: 28.9 PG (ref 26.6–33)
MCHC RBC AUTO-ENTMCNC: 32.6 G/DL (ref 31.5–35.7)
MCV RBC AUTO: 88.6 FL (ref 79–97)
MONOCYTES # BLD AUTO: 0.5 10*3/MM3 (ref 0.1–0.9)
MONOCYTES NFR BLD AUTO: 10.3 % (ref 5–12)
NEUTROPHILS NFR BLD AUTO: 2.56 10*3/MM3 (ref 1.7–7)
NEUTROPHILS NFR BLD AUTO: 52.8 % (ref 42.7–76)
NRBC BLD AUTO-RTO: 0 /100 WBC (ref 0–0.2)
PLATELET # BLD AUTO: 173 10*3/MM3 (ref 140–450)
PMV BLD AUTO: 10.8 FL (ref 6–12)
POTASSIUM SERPL-SCNC: 5 MMOL/L (ref 3.5–5.2)
PROTHROMBIN TIME: 20.5 SECONDS (ref 11.1–15.3)
RBC # BLD AUTO: 4.39 10*6/MM3 (ref 4.14–5.8)
SODIUM SERPL-SCNC: 137 MMOL/L (ref 136–145)
WBC # BLD AUTO: 4.85 10*3/MM3 (ref 3.4–10.8)

## 2020-07-25 PROCEDURE — 25010000002 FUROSEMIDE PER 20 MG: Performed by: HOSPITALIST

## 2020-07-25 PROCEDURE — 25010000002 FUROSEMIDE PER 20 MG: Performed by: INTERNAL MEDICINE

## 2020-07-25 PROCEDURE — 85025 COMPLETE CBC W/AUTO DIFF WBC: CPT | Performed by: HOSPITALIST

## 2020-07-25 PROCEDURE — 85610 PROTHROMBIN TIME: CPT | Performed by: HOSPITALIST

## 2020-07-25 PROCEDURE — 80048 BASIC METABOLIC PNL TOTAL CA: CPT | Performed by: HOSPITALIST

## 2020-07-25 PROCEDURE — G0378 HOSPITAL OBSERVATION PER HR: HCPCS

## 2020-07-25 RX ORDER — WARFARIN SODIUM 7.5 MG/1
7.5 TABLET ORAL
Status: DISCONTINUED | OUTPATIENT
Start: 2020-07-25 | End: 2020-07-26

## 2020-07-25 RX ADMIN — SACUBITRIL AND VALSARTAN 1 TABLET: 24; 26 TABLET, FILM COATED ORAL at 20:33

## 2020-07-25 RX ADMIN — PANTOPRAZOLE SODIUM 20 MG: 20 TABLET, DELAYED RELEASE ORAL at 08:15

## 2020-07-25 RX ADMIN — FUROSEMIDE 40 MG: 10 INJECTION, SOLUTION INTRAMUSCULAR; INTRAVENOUS at 08:18

## 2020-07-25 RX ADMIN — MAGNESIUM OXIDE 400 MG (241.3 MG MAGNESIUM) TABLET 400 MG: TABLET at 20:33

## 2020-07-25 RX ADMIN — CALCIUM CARBONATE (ANTACID) CHEW TAB 500 MG 2 TABLET: 500 CHEW TAB at 08:17

## 2020-07-25 RX ADMIN — SODIUM CHLORIDE, PRESERVATIVE FREE 10 ML: 5 INJECTION INTRAVENOUS at 08:22

## 2020-07-25 RX ADMIN — SODIUM CHLORIDE, PRESERVATIVE FREE 10 ML: 5 INJECTION INTRAVENOUS at 20:34

## 2020-07-25 RX ADMIN — MAGNESIUM OXIDE 400 MG (241.3 MG MAGNESIUM) TABLET 400 MG: TABLET at 08:18

## 2020-07-25 RX ADMIN — DIGOXIN 250 MCG: 250 TABLET ORAL at 11:18

## 2020-07-25 RX ADMIN — METOPROLOL TARTRATE 12.5 MG: 25 TABLET, FILM COATED ORAL at 08:26

## 2020-07-25 RX ADMIN — SACUBITRIL AND VALSARTAN 1 TABLET: 24; 26 TABLET, FILM COATED ORAL at 08:18

## 2020-07-25 RX ADMIN — METOPROLOL TARTRATE 12.5 MG: 25 TABLET, FILM COATED ORAL at 21:55

## 2020-07-26 LAB
ANION GAP SERPL CALCULATED.3IONS-SCNC: 9 MMOL/L (ref 5–15)
BASOPHILS # BLD AUTO: 0.04 10*3/MM3 (ref 0–0.2)
BASOPHILS NFR BLD AUTO: 0.8 % (ref 0–1.5)
BUN SERPL-MCNC: 19 MG/DL (ref 6–20)
BUN/CREAT SERPL: 24.1 (ref 7–25)
CALCIUM SPEC-SCNC: 9.1 MG/DL (ref 8.6–10.5)
CHLORIDE SERPL-SCNC: 102 MMOL/L (ref 98–107)
CO2 SERPL-SCNC: 26 MMOL/L (ref 22–29)
CREAT SERPL-MCNC: 0.79 MG/DL (ref 0.76–1.27)
DEPRECATED RDW RBC AUTO: 49.8 FL (ref 37–54)
EOSINOPHIL # BLD AUTO: 0.18 10*3/MM3 (ref 0–0.4)
EOSINOPHIL NFR BLD AUTO: 3.8 % (ref 0.3–6.2)
ERYTHROCYTE [DISTWIDTH] IN BLOOD BY AUTOMATED COUNT: 15.4 % (ref 12.3–15.4)
GFR SERPL CREATININE-BSD FRML MDRD: 110 ML/MIN/1.73
GLUCOSE SERPL-MCNC: 84 MG/DL (ref 65–99)
HCT VFR BLD AUTO: 40.9 % (ref 37.5–51)
HGB BLD-MCNC: 13.2 G/DL (ref 13–17.7)
IMM GRANULOCYTES # BLD AUTO: 0.01 10*3/MM3 (ref 0–0.05)
IMM GRANULOCYTES NFR BLD AUTO: 0.2 % (ref 0–0.5)
INR PPP: 1.72 (ref 0.8–1.2)
LYMPHOCYTES # BLD AUTO: 1.82 10*3/MM3 (ref 0.7–3.1)
LYMPHOCYTES NFR BLD AUTO: 38.2 % (ref 19.6–45.3)
MCH RBC QN AUTO: 28.7 PG (ref 26.6–33)
MCHC RBC AUTO-ENTMCNC: 32.3 G/DL (ref 31.5–35.7)
MCV RBC AUTO: 88.9 FL (ref 79–97)
MONOCYTES # BLD AUTO: 0.56 10*3/MM3 (ref 0.1–0.9)
MONOCYTES NFR BLD AUTO: 11.7 % (ref 5–12)
NEUTROPHILS NFR BLD AUTO: 2.16 10*3/MM3 (ref 1.7–7)
NEUTROPHILS NFR BLD AUTO: 45.3 % (ref 42.7–76)
NRBC BLD AUTO-RTO: 0 /100 WBC (ref 0–0.2)
PLATELET # BLD AUTO: 186 10*3/MM3 (ref 140–450)
PMV BLD AUTO: 10.6 FL (ref 6–12)
POTASSIUM SERPL-SCNC: 4.4 MMOL/L (ref 3.5–5.2)
PROTHROMBIN TIME: 21.1 SECONDS (ref 11.1–15.3)
RBC # BLD AUTO: 4.6 10*6/MM3 (ref 4.14–5.8)
SODIUM SERPL-SCNC: 137 MMOL/L (ref 136–145)
WBC # BLD AUTO: 4.77 10*3/MM3 (ref 3.4–10.8)

## 2020-07-26 PROCEDURE — G0378 HOSPITAL OBSERVATION PER HR: HCPCS

## 2020-07-26 PROCEDURE — 85025 COMPLETE CBC W/AUTO DIFF WBC: CPT | Performed by: HOSPITALIST

## 2020-07-26 PROCEDURE — 80048 BASIC METABOLIC PNL TOTAL CA: CPT | Performed by: HOSPITALIST

## 2020-07-26 PROCEDURE — 85610 PROTHROMBIN TIME: CPT | Performed by: HOSPITALIST

## 2020-07-26 RX ADMIN — SACUBITRIL AND VALSARTAN 1 TABLET: 24; 26 TABLET, FILM COATED ORAL at 20:11

## 2020-07-26 RX ADMIN — SODIUM CHLORIDE, PRESERVATIVE FREE 10 ML: 5 INJECTION INTRAVENOUS at 21:57

## 2020-07-26 RX ADMIN — METOPROLOL TARTRATE 12.5 MG: 25 TABLET, FILM COATED ORAL at 21:57

## 2020-07-26 RX ADMIN — DIGOXIN 250 MCG: 250 TABLET ORAL at 12:29

## 2020-07-26 RX ADMIN — Medication 12.5 MG: at 08:17

## 2020-07-26 RX ADMIN — MAGNESIUM OXIDE 400 MG (241.3 MG MAGNESIUM) TABLET 400 MG: TABLET at 20:11

## 2020-07-26 RX ADMIN — PANTOPRAZOLE SODIUM 20 MG: 20 TABLET, DELAYED RELEASE ORAL at 08:17

## 2020-07-26 RX ADMIN — SACUBITRIL AND VALSARTAN 1 TABLET: 24; 26 TABLET, FILM COATED ORAL at 08:17

## 2020-07-26 RX ADMIN — METOPROLOL TARTRATE 12.5 MG: 25 TABLET, FILM COATED ORAL at 08:17

## 2020-07-26 RX ADMIN — CALCIUM CARBONATE (ANTACID) CHEW TAB 500 MG 2 TABLET: 500 CHEW TAB at 08:17

## 2020-07-26 RX ADMIN — MAGNESIUM OXIDE 400 MG (241.3 MG MAGNESIUM) TABLET 400 MG: TABLET at 08:17

## 2020-07-27 ENCOUNTER — ANESTHESIA EVENT (OUTPATIENT)
Dept: CARDIOLOGY | Facility: HOSPITAL | Age: 40
End: 2020-07-27

## 2020-07-27 LAB
ANION GAP SERPL CALCULATED.3IONS-SCNC: 7 MMOL/L (ref 5–15)
BASOPHILS # BLD AUTO: 0.04 10*3/MM3 (ref 0–0.2)
BASOPHILS NFR BLD AUTO: 0.7 % (ref 0–1.5)
BUN SERPL-MCNC: 25 MG/DL (ref 6–20)
BUN/CREAT SERPL: 30.5 (ref 7–25)
CALCIUM SPEC-SCNC: 8.7 MG/DL (ref 8.6–10.5)
CHLORIDE SERPL-SCNC: 103 MMOL/L (ref 98–107)
CO2 SERPL-SCNC: 26 MMOL/L (ref 22–29)
CREAT SERPL-MCNC: 0.82 MG/DL (ref 0.76–1.27)
DEPRECATED RDW RBC AUTO: 50.4 FL (ref 37–54)
EOSINOPHIL # BLD AUTO: 0.18 10*3/MM3 (ref 0–0.4)
EOSINOPHIL NFR BLD AUTO: 3 % (ref 0.3–6.2)
ERYTHROCYTE [DISTWIDTH] IN BLOOD BY AUTOMATED COUNT: 15.5 % (ref 12.3–15.4)
GFR SERPL CREATININE-BSD FRML MDRD: 105 ML/MIN/1.73
GLUCOSE SERPL-MCNC: 101 MG/DL (ref 65–99)
HCT VFR BLD AUTO: 39.3 % (ref 37.5–51)
HGB BLD-MCNC: 12.8 G/DL (ref 13–17.7)
IMM GRANULOCYTES # BLD AUTO: 0.01 10*3/MM3 (ref 0–0.05)
IMM GRANULOCYTES NFR BLD AUTO: 0.2 % (ref 0–0.5)
INR PPP: 1.43 (ref 0.8–1.2)
LYMPHOCYTES # BLD AUTO: 1.67 10*3/MM3 (ref 0.7–3.1)
LYMPHOCYTES NFR BLD AUTO: 27.9 % (ref 19.6–45.3)
MCH RBC QN AUTO: 29.2 PG (ref 26.6–33)
MCHC RBC AUTO-ENTMCNC: 32.6 G/DL (ref 31.5–35.7)
MCV RBC AUTO: 89.5 FL (ref 79–97)
MONOCYTES # BLD AUTO: 0.51 10*3/MM3 (ref 0.1–0.9)
MONOCYTES NFR BLD AUTO: 8.5 % (ref 5–12)
NEUTROPHILS NFR BLD AUTO: 3.58 10*3/MM3 (ref 1.7–7)
NEUTROPHILS NFR BLD AUTO: 59.7 % (ref 42.7–76)
NRBC BLD AUTO-RTO: 0 /100 WBC (ref 0–0.2)
PLATELET # BLD AUTO: 205 10*3/MM3 (ref 140–450)
PMV BLD AUTO: 11.1 FL (ref 6–12)
POTASSIUM SERPL-SCNC: 4.2 MMOL/L (ref 3.5–5.2)
PROTHROMBIN TIME: 18.2 SECONDS (ref 11.1–15.3)
RBC # BLD AUTO: 4.39 10*6/MM3 (ref 4.14–5.8)
SARS-COV-2 N GENE RESP QL NAA+PROBE: NOT DETECTED
SODIUM SERPL-SCNC: 136 MMOL/L (ref 136–145)
WBC # BLD AUTO: 5.99 10*3/MM3 (ref 3.4–10.8)

## 2020-07-27 PROCEDURE — 85025 COMPLETE CBC W/AUTO DIFF WBC: CPT | Performed by: HOSPITALIST

## 2020-07-27 PROCEDURE — 85610 PROTHROMBIN TIME: CPT | Performed by: HOSPITALIST

## 2020-07-27 PROCEDURE — 99255 IP/OBS CONSLTJ NEW/EST HI 80: CPT | Performed by: INTERNAL MEDICINE

## 2020-07-27 PROCEDURE — 80048 BASIC METABOLIC PNL TOTAL CA: CPT | Performed by: HOSPITALIST

## 2020-07-27 PROCEDURE — 87635 SARS-COV-2 COVID-19 AMP PRB: CPT | Performed by: INTERNAL MEDICINE

## 2020-07-27 RX ADMIN — METOPROLOL TARTRATE 12.5 MG: 25 TABLET, FILM COATED ORAL at 08:57

## 2020-07-27 RX ADMIN — SACUBITRIL AND VALSARTAN 1 TABLET: 24; 26 TABLET, FILM COATED ORAL at 08:57

## 2020-07-27 RX ADMIN — SODIUM CHLORIDE, PRESERVATIVE FREE 10 ML: 5 INJECTION INTRAVENOUS at 20:23

## 2020-07-27 RX ADMIN — PANTOPRAZOLE SODIUM 20 MG: 20 TABLET, DELAYED RELEASE ORAL at 08:57

## 2020-07-27 RX ADMIN — MAGNESIUM OXIDE 400 MG (241.3 MG MAGNESIUM) TABLET 400 MG: TABLET at 20:22

## 2020-07-27 RX ADMIN — METOPROLOL TARTRATE 12.5 MG: 25 TABLET, FILM COATED ORAL at 20:22

## 2020-07-27 RX ADMIN — DIGOXIN 250 MCG: 250 TABLET ORAL at 12:24

## 2020-07-27 RX ADMIN — CALCIUM CARBONATE (ANTACID) CHEW TAB 500 MG 2 TABLET: 500 CHEW TAB at 08:57

## 2020-07-27 RX ADMIN — MAGNESIUM OXIDE 400 MG (241.3 MG MAGNESIUM) TABLET 400 MG: TABLET at 08:57

## 2020-07-27 RX ADMIN — Medication 12.5 MG: at 08:57

## 2020-07-27 RX ADMIN — SACUBITRIL AND VALSARTAN 1 TABLET: 24; 26 TABLET, FILM COATED ORAL at 20:22

## 2020-07-27 NOTE — ANESTHESIA PREPROCEDURE EVALUATION
" Anesthesia Evaluation     Patient summary reviewed and Nursing notes reviewed   no history of anesthetic complications:  NPO Solid Status: > 8 hours             Airway   Mallampati: II  TM distance: >3 FB  Neck ROM: full  possible difficult intubation  Dental    (+) poor dentation    Pulmonary     breath sounds clear to auscultation  (+) pneumonia , pulmonary embolism, a smoker Current Abstained day of surgery, sleep apnea on CPAP, decreased breath sounds,     ROS comment:    Cardiac silhouette is enlarged. The cardiac silhouette has a  \"globular\" appearance which can be associated with pericardial  effusion. There is no evidence of acute congestive heart failure.  There are no pleural effusions. No focal infiltrates are  identified.     IMPRESSION:        1. Cardiomegaly without radiographic evidence of acute congestive  heart failure. Question pericardial effusion due to the  configuration of the cardiac silhouette.  2. No focal infiltrates are identified.           Electronically signed by:  Djeah Mcintyre MD  7/23/2020 2:31 PM  Cardiovascular - normal exam    ECG reviewed  PT is on anticoagulation therapy  Patient on routine beta blocker and Beta blocker given within 24 hours of surgery  Rhythm: regular  Rate: normal    (+) hypertension well controlled 2 medications or greater, CHF Systolic <55%, DVT resolved,   (-) murmur    ROS comment: Normal sinus rhythm  Possible Left atrial enlargement  Septal infarct , age undetermined  T wave abnormality, consider lateral ischemia  Abnormal ECG  When compared with ECG of 08-FEB-2020 14:46,  Septal infarct is now present  ST no longer depressed in Anterior leads  Confirmed by AMELIE ZULUAGA, B. N. ([id number]) on 7/23/2020 8:05:55 PM· Left atrial cavity size is moderate-to-severely dilated.  · Mild mitral valve regurgitation is present  · Mild to moderate tricuspid valve regurgitation is present.  · The left ventricular cavity is mildly dilated.  · Mildly reduced right " ventricular systolic function noted.  · The following left ventricular wall segments are hypokinetic: mid anterior, apical anterior, basal anterolateral, mid anterolateral, apical lateral, basal inferolateral, mid inferolateral, apical inferior, mid inferior, apical septal, basal inferoseptal, mid inferoseptal, apex hypokinetic, mid anteroseptal, basal anterior, basal inferior and basal inferoseptal.Left Ventricle Estimated EF appears to be in the range of less than 20%. Normal left ventricular wall thickness noted. There is left ventricular global hypokinesis noted.   The left ventricular cavity is mildly dilated. Left ventricular diastolic function is normal. Spontaneous contrast consistent with low flow rate is noted in the left ventricle.        Neuro/Psych  (+) psychiatric history Anxiety,     GI/Hepatic/Renal/Endo    (+)  GERD well controlled,      Musculoskeletal (-) negative ROS    Abdominal  - normal exam   Substance History - negative use     OB/GYN negative ob/gyn ROS         Other - negative ROS       ROS/Med Hx Other: ICD placement  EF<20%  Warfarin last taken two days ago- INR this AM 1.18  TTE 7/24/2020  · Left atrial cavity size is moderate-to-severely dilated.  · Mild mitral valve regurgitation is present  · Mild to moderate tricuspid valve regurgitation is present.  · The left ventricular cavity is mildly dilated.  · Mildly reduced right ventricular systolic function noted.  · The following left ventricular wall segments are hypokinetic: mid anterior, apical anterior, basal anterolateral, mid anterolateral, apical lateral, basal inferolateral, mid inferolateral, apical inferior, mid inferior, apical septal, basal inferoseptal, mid inferoseptal, apex hypokinetic, mid anteroseptal, basal anterior, basal inferior and basal inferoseptal.                   Anesthesia Plan    ASA 4     general and MAC   total IV anesthesia(Discussed central line,arterial line if necessary and patient understands possible  complications,risks and agrees. Severely reduced EF <20%.)  intravenous induction     Anesthetic plan, all risks, benefits, and alternatives have been provided, discussed and informed consent has been obtained with: patient.

## 2020-07-28 ENCOUNTER — APPOINTMENT (OUTPATIENT)
Dept: GENERAL RADIOLOGY | Facility: HOSPITAL | Age: 40
End: 2020-07-28

## 2020-07-28 ENCOUNTER — HOSPITAL ENCOUNTER (OUTPATIENT)
Dept: NUCLEAR MEDICINE | Facility: HOSPITAL | Age: 40
End: 2020-07-28

## 2020-07-28 ENCOUNTER — ANESTHESIA (OUTPATIENT)
Dept: CARDIOLOGY | Facility: HOSPITAL | Age: 40
End: 2020-07-28

## 2020-07-28 LAB
ANION GAP SERPL CALCULATED.3IONS-SCNC: 8 MMOL/L (ref 5–15)
BASOPHILS # BLD AUTO: 0.03 10*3/MM3 (ref 0–0.2)
BASOPHILS NFR BLD AUTO: 0.7 % (ref 0–1.5)
BUN SERPL-MCNC: 24 MG/DL (ref 6–20)
BUN/CREAT SERPL: 27.9 (ref 7–25)
CALCIUM SPEC-SCNC: 8.7 MG/DL (ref 8.6–10.5)
CHLORIDE SERPL-SCNC: 106 MMOL/L (ref 98–107)
CO2 SERPL-SCNC: 25 MMOL/L (ref 22–29)
CREAT SERPL-MCNC: 0.86 MG/DL (ref 0.76–1.27)
DEPRECATED RDW RBC AUTO: 50.4 FL (ref 37–54)
EOSINOPHIL # BLD AUTO: 0.15 10*3/MM3 (ref 0–0.4)
EOSINOPHIL NFR BLD AUTO: 3.4 % (ref 0.3–6.2)
ERYTHROCYTE [DISTWIDTH] IN BLOOD BY AUTOMATED COUNT: 15.5 % (ref 12.3–15.4)
GFR SERPL CREATININE-BSD FRML MDRD: 100 ML/MIN/1.73
GLUCOSE SERPL-MCNC: 95 MG/DL (ref 65–99)
HCT VFR BLD AUTO: 39.7 % (ref 37.5–51)
HGB BLD-MCNC: 12.7 G/DL (ref 13–17.7)
IMM GRANULOCYTES # BLD AUTO: 0.01 10*3/MM3 (ref 0–0.05)
IMM GRANULOCYTES NFR BLD AUTO: 0.2 % (ref 0–0.5)
INR PPP: 1.18 (ref 0.8–1.2)
LYMPHOCYTES # BLD AUTO: 1.46 10*3/MM3 (ref 0.7–3.1)
LYMPHOCYTES NFR BLD AUTO: 33.3 % (ref 19.6–45.3)
MCH RBC QN AUTO: 28.4 PG (ref 26.6–33)
MCHC RBC AUTO-ENTMCNC: 32 G/DL (ref 31.5–35.7)
MCV RBC AUTO: 88.8 FL (ref 79–97)
MONOCYTES # BLD AUTO: 0.36 10*3/MM3 (ref 0.1–0.9)
MONOCYTES NFR BLD AUTO: 8.2 % (ref 5–12)
NEUTROPHILS NFR BLD AUTO: 2.37 10*3/MM3 (ref 1.7–7)
NEUTROPHILS NFR BLD AUTO: 54.2 % (ref 42.7–76)
NRBC BLD AUTO-RTO: 0 /100 WBC (ref 0–0.2)
PLATELET # BLD AUTO: 182 10*3/MM3 (ref 140–450)
PMV BLD AUTO: 10.3 FL (ref 6–12)
POTASSIUM SERPL-SCNC: 4.5 MMOL/L (ref 3.5–5.2)
PROTHROMBIN TIME: 15.6 SECONDS (ref 11.1–15.3)
RBC # BLD AUTO: 4.47 10*6/MM3 (ref 4.14–5.8)
SODIUM SERPL-SCNC: 139 MMOL/L (ref 136–145)
WBC # BLD AUTO: 4.38 10*3/MM3 (ref 3.4–10.8)

## 2020-07-28 PROCEDURE — 93641 EP EVL 1/2CHMB PAC CVDFB TST: CPT | Performed by: INTERNAL MEDICINE

## 2020-07-28 PROCEDURE — 25010000002 MIDAZOLAM PER 1 MG: Performed by: NURSE ANESTHETIST, CERTIFIED REGISTERED

## 2020-07-28 PROCEDURE — C1894 INTRO/SHEATH, NON-LASER: HCPCS | Performed by: INTERNAL MEDICINE

## 2020-07-28 PROCEDURE — 33249 INSJ/RPLCMT DEFIB W/LEAD(S): CPT | Performed by: INTERNAL MEDICINE

## 2020-07-28 PROCEDURE — 25010000002 FENTANYL CITRATE (PF) 100 MCG/2ML SOLUTION: Performed by: NURSE ANESTHETIST, CERTIFIED REGISTERED

## 2020-07-28 PROCEDURE — C1722 AICD, SINGLE CHAMBER: HCPCS | Performed by: INTERNAL MEDICINE

## 2020-07-28 PROCEDURE — 85610 PROTHROMBIN TIME: CPT | Performed by: HOSPITALIST

## 2020-07-28 PROCEDURE — 25010000002 GENTAMICIN PER 80 MG: Performed by: INTERNAL MEDICINE

## 2020-07-28 PROCEDURE — 71045 X-RAY EXAM CHEST 1 VIEW: CPT

## 2020-07-28 PROCEDURE — C1895 LEAD, AICD, ENDO DUAL COIL: HCPCS | Performed by: INTERNAL MEDICINE

## 2020-07-28 PROCEDURE — 0JH608Z INSERTION OF DEFIBRILLATOR GENERATOR INTO CHEST SUBCUTANEOUS TISSUE AND FASCIA, OPEN APPROACH: ICD-10-PCS | Performed by: INTERNAL MEDICINE

## 2020-07-28 PROCEDURE — 25010000002 PROPOFOL 10 MG/ML EMULSION: Performed by: NURSE ANESTHETIST, CERTIFIED REGISTERED

## 2020-07-28 PROCEDURE — 80048 BASIC METABOLIC PNL TOTAL CA: CPT | Performed by: HOSPITALIST

## 2020-07-28 PROCEDURE — 02HK3KZ INSERTION OF DEFIBRILLATOR LEAD INTO RIGHT VENTRICLE, PERCUTANEOUS APPROACH: ICD-10-PCS | Performed by: INTERNAL MEDICINE

## 2020-07-28 PROCEDURE — 85025 COMPLETE CBC W/AUTO DIFF WBC: CPT | Performed by: HOSPITALIST

## 2020-07-28 DEVICE — LD DEFIB DURATA SJ4 58CM 7120Q58: Type: IMPLANTABLE DEVICE | Status: FUNCTIONAL

## 2020-07-28 DEVICE — IMPLANTABLE DEVICE: Type: IMPLANTABLE DEVICE | Status: FUNCTIONAL

## 2020-07-28 RX ORDER — MIDAZOLAM HYDROCHLORIDE 1 MG/ML
INJECTION INTRAMUSCULAR; INTRAVENOUS AS NEEDED
Status: DISCONTINUED | OUTPATIENT
Start: 2020-07-28 | End: 2020-07-28 | Stop reason: SURG

## 2020-07-28 RX ORDER — SODIUM CHLORIDE 9 MG/ML
INJECTION, SOLUTION INTRAVENOUS CONTINUOUS PRN
Status: DISCONTINUED | OUTPATIENT
Start: 2020-07-28 | End: 2020-07-28 | Stop reason: SURG

## 2020-07-28 RX ORDER — HYDROCODONE BITARTRATE AND ACETAMINOPHEN 5; 325 MG/1; MG/1
1 TABLET ORAL EVERY 4 HOURS PRN
Status: DISCONTINUED | OUTPATIENT
Start: 2020-07-28 | End: 2020-07-29 | Stop reason: HOSPADM

## 2020-07-28 RX ORDER — BUPIVACAINE HCL/0.9 % NACL/PF 0.1 %
2 PLASTIC BAG, INJECTION (ML) EPIDURAL ONCE
Status: COMPLETED | OUTPATIENT
Start: 2020-07-28 | End: 2020-07-28

## 2020-07-28 RX ORDER — WARFARIN SODIUM 10 MG/1
10 TABLET ORAL
Status: DISCONTINUED | OUTPATIENT
Start: 2020-07-29 | End: 2020-07-29 | Stop reason: HOSPADM

## 2020-07-28 RX ORDER — FENTANYL CITRATE 50 UG/ML
INJECTION, SOLUTION INTRAMUSCULAR; INTRAVENOUS AS NEEDED
Status: DISCONTINUED | OUTPATIENT
Start: 2020-07-28 | End: 2020-07-28 | Stop reason: SURG

## 2020-07-28 RX ORDER — WARFARIN SODIUM 10 MG/1
10 TABLET ORAL NIGHTLY
Status: DISCONTINUED | OUTPATIENT
Start: 2020-07-29 | End: 2020-07-28

## 2020-07-28 RX ORDER — ONDANSETRON 2 MG/ML
4 INJECTION INTRAMUSCULAR; INTRAVENOUS EVERY 6 HOURS PRN
Status: DISCONTINUED | OUTPATIENT
Start: 2020-07-28 | End: 2020-07-29 | Stop reason: HOSPADM

## 2020-07-28 RX ORDER — KETAMINE HYDROCHLORIDE 100 MG/ML
INJECTION INTRAMUSCULAR; INTRAVENOUS AS NEEDED
Status: DISCONTINUED | OUTPATIENT
Start: 2020-07-28 | End: 2020-07-28 | Stop reason: SURG

## 2020-07-28 RX ORDER — LIDOCAINE HYDROCHLORIDE 20 MG/ML
INJECTION, SOLUTION INFILTRATION; PERINEURAL AS NEEDED
Status: DISCONTINUED | OUTPATIENT
Start: 2020-07-28 | End: 2020-07-28 | Stop reason: HOSPADM

## 2020-07-28 RX ORDER — WARFARIN SODIUM 7.5 MG/1
7.5 TABLET ORAL NIGHTLY
Status: DISCONTINUED | OUTPATIENT
Start: 2020-07-30 | End: 2020-07-29 | Stop reason: HOSPADM

## 2020-07-28 RX ORDER — PROPOFOL 10 MG/ML
VIAL (ML) INTRAVENOUS AS NEEDED
Status: DISCONTINUED | OUTPATIENT
Start: 2020-07-28 | End: 2020-07-28 | Stop reason: SURG

## 2020-07-28 RX ORDER — SODIUM CHLORIDE 0.9 % (FLUSH) 0.9 %
3 SYRINGE (ML) INJECTION EVERY 12 HOURS SCHEDULED
Status: DISCONTINUED | OUTPATIENT
Start: 2020-07-28 | End: 2020-07-29 | Stop reason: HOSPADM

## 2020-07-28 RX ORDER — SODIUM CHLORIDE 0.9 % (FLUSH) 0.9 %
10 SYRINGE (ML) INJECTION AS NEEDED
Status: DISCONTINUED | OUTPATIENT
Start: 2020-07-28 | End: 2020-07-29 | Stop reason: HOSPADM

## 2020-07-28 RX ADMIN — FENTANYL CITRATE 25 MCG: 50 INJECTION, SOLUTION INTRAMUSCULAR; INTRAVENOUS at 08:58

## 2020-07-28 RX ADMIN — PROPOFOL 50 MG: 10 INJECTION, EMULSION INTRAVENOUS at 08:42

## 2020-07-28 RX ADMIN — Medication 2 G: at 08:42

## 2020-07-28 RX ADMIN — PROPOFOL 50 MG: 10 INJECTION, EMULSION INTRAVENOUS at 10:34

## 2020-07-28 RX ADMIN — KETAMINE HYDROCHLORIDE 50 MG: 100 INJECTION INTRAMUSCULAR; INTRAVENOUS at 08:44

## 2020-07-28 RX ADMIN — SODIUM CHLORIDE: 900 INJECTION, SOLUTION INTRAVENOUS at 08:30

## 2020-07-28 RX ADMIN — Medication 1 G: at 10:30

## 2020-07-28 RX ADMIN — SODIUM CHLORIDE, PRESERVATIVE FREE 10 ML: 5 INJECTION INTRAVENOUS at 20:48

## 2020-07-28 RX ADMIN — METOPROLOL TARTRATE 12.5 MG: 25 TABLET, FILM COATED ORAL at 20:48

## 2020-07-28 RX ADMIN — ZOLPIDEM TARTRATE 10 MG: 5 TABLET ORAL at 00:20

## 2020-07-28 RX ADMIN — HYDROCODONE BITARTRATE AND ACETAMINOPHEN 1 TABLET: 5; 325 TABLET ORAL at 20:55

## 2020-07-28 RX ADMIN — MAGNESIUM OXIDE 400 MG (241.3 MG MAGNESIUM) TABLET 400 MG: TABLET at 20:48

## 2020-07-28 RX ADMIN — PROPOFOL 60 MCG/KG/MIN: 10 INJECTION, EMULSION INTRAVENOUS at 08:42

## 2020-07-28 RX ADMIN — MIDAZOLAM HYDROCHLORIDE 2 MG: 2 INJECTION, SOLUTION INTRAMUSCULAR; INTRAVENOUS at 08:39

## 2020-07-28 RX ADMIN — GENTAMICIN SULFATE 80 MG: 40 INJECTION, SOLUTION INTRAMUSCULAR; INTRAVENOUS at 08:48

## 2020-07-28 RX ADMIN — FENTANYL CITRATE 25 MCG: 50 INJECTION, SOLUTION INTRAMUSCULAR; INTRAVENOUS at 10:23

## 2020-07-28 RX ADMIN — FENTANYL CITRATE 25 MCG: 50 INJECTION, SOLUTION INTRAMUSCULAR; INTRAVENOUS at 08:50

## 2020-07-28 RX ADMIN — FENTANYL CITRATE 25 MCG: 50 INJECTION, SOLUTION INTRAMUSCULAR; INTRAVENOUS at 09:44

## 2020-07-28 NOTE — ANESTHESIA POSTPROCEDURE EVALUATION
Patient: Didier Dahl    Procedure Summary     Date:  07/28/20 Room / Location:  [facility] CATH/EP LAB 3 / [facility] CATH INVASIVE LOCATION    Anesthesia Start:  0832 Anesthesia Stop:  1117    Procedure:  ICD SC new (N/A ) Diagnosis:  Acute on chronic congestive heart failure, unspecified heart failure type (CMS/HCC)    Provider:  Ivis Gorman MD Provider:  Safia Eddy DO    Anesthesia Type:  general, MAC ASA Status:  4          Anesthesia Type: general, MAC    Vitals  No vitals data found for the desired time range.          Post Anesthesia Care and Evaluation    Patient location during evaluation: PACU  Patient participation: complete - patient participated  Level of consciousness: awake and alert  Pain score: 1  Pain management: adequate  Airway patency: patent  Anesthetic complications: No anesthetic complications  PONV Status: none  Cardiovascular status: acceptable  Respiratory status: acceptable  Hydration status: acceptable

## 2020-07-29 VITALS
OXYGEN SATURATION: 95 % | SYSTOLIC BLOOD PRESSURE: 101 MMHG | WEIGHT: 182.8 LBS | DIASTOLIC BLOOD PRESSURE: 68 MMHG | HEIGHT: 71 IN | RESPIRATION RATE: 18 BRPM | TEMPERATURE: 97.2 F | HEART RATE: 93 BPM | BODY MASS INDEX: 25.59 KG/M2

## 2020-07-29 LAB
ANION GAP SERPL CALCULATED.3IONS-SCNC: 9 MMOL/L (ref 5–15)
BASOPHILS # BLD AUTO: 0.03 10*3/MM3 (ref 0–0.2)
BASOPHILS NFR BLD AUTO: 0.6 % (ref 0–1.5)
BUN SERPL-MCNC: 16 MG/DL (ref 6–20)
BUN/CREAT SERPL: 18.6 (ref 7–25)
CALCIUM SPEC-SCNC: 8.6 MG/DL (ref 8.6–10.5)
CHLORIDE SERPL-SCNC: 106 MMOL/L (ref 98–107)
CO2 SERPL-SCNC: 23 MMOL/L (ref 22–29)
CREAT SERPL-MCNC: 0.86 MG/DL (ref 0.76–1.27)
DEPRECATED RDW RBC AUTO: 50.4 FL (ref 37–54)
EOSINOPHIL # BLD AUTO: 0.16 10*3/MM3 (ref 0–0.4)
EOSINOPHIL NFR BLD AUTO: 3.2 % (ref 0.3–6.2)
ERYTHROCYTE [DISTWIDTH] IN BLOOD BY AUTOMATED COUNT: 15.5 % (ref 12.3–15.4)
GFR SERPL CREATININE-BSD FRML MDRD: 100 ML/MIN/1.73
GLUCOSE SERPL-MCNC: 97 MG/DL (ref 65–99)
HCT VFR BLD AUTO: 38.6 % (ref 37.5–51)
HGB BLD-MCNC: 12.5 G/DL (ref 13–17.7)
IMM GRANULOCYTES # BLD AUTO: 0.01 10*3/MM3 (ref 0–0.05)
IMM GRANULOCYTES NFR BLD AUTO: 0.2 % (ref 0–0.5)
INR PPP: 1.21 (ref 0.8–1.2)
LYMPHOCYTES # BLD AUTO: 1.59 10*3/MM3 (ref 0.7–3.1)
LYMPHOCYTES NFR BLD AUTO: 31.7 % (ref 19.6–45.3)
MCH RBC QN AUTO: 28.7 PG (ref 26.6–33)
MCHC RBC AUTO-ENTMCNC: 32.4 G/DL (ref 31.5–35.7)
MCV RBC AUTO: 88.5 FL (ref 79–97)
MONOCYTES # BLD AUTO: 0.55 10*3/MM3 (ref 0.1–0.9)
MONOCYTES NFR BLD AUTO: 11 % (ref 5–12)
NEUTROPHILS NFR BLD AUTO: 2.68 10*3/MM3 (ref 1.7–7)
NEUTROPHILS NFR BLD AUTO: 53.3 % (ref 42.7–76)
NRBC BLD AUTO-RTO: 0 /100 WBC (ref 0–0.2)
PLATELET # BLD AUTO: 175 10*3/MM3 (ref 140–450)
PMV BLD AUTO: 10.4 FL (ref 6–12)
POTASSIUM SERPL-SCNC: 4.4 MMOL/L (ref 3.5–5.2)
PROTHROMBIN TIME: 15.9 SECONDS (ref 11.1–15.3)
RBC # BLD AUTO: 4.36 10*6/MM3 (ref 4.14–5.8)
SODIUM SERPL-SCNC: 138 MMOL/L (ref 136–145)
WBC # BLD AUTO: 5.02 10*3/MM3 (ref 3.4–10.8)

## 2020-07-29 PROCEDURE — 80048 BASIC METABOLIC PNL TOTAL CA: CPT | Performed by: INTERNAL MEDICINE

## 2020-07-29 PROCEDURE — 85610 PROTHROMBIN TIME: CPT | Performed by: INTERNAL MEDICINE

## 2020-07-29 PROCEDURE — 99024 POSTOP FOLLOW-UP VISIT: CPT | Performed by: INTERNAL MEDICINE

## 2020-07-29 PROCEDURE — 85025 COMPLETE CBC W/AUTO DIFF WBC: CPT | Performed by: INTERNAL MEDICINE

## 2020-07-29 PROCEDURE — 25010000002 FUROSEMIDE PER 20 MG: Performed by: INTERNAL MEDICINE

## 2020-07-29 RX ORDER — HYDROCODONE BITARTRATE AND ACETAMINOPHEN 5; 325 MG/1; MG/1
1 TABLET ORAL EVERY 4 HOURS PRN
Qty: 18 TABLET | Refills: 0 | Status: SHIPPED | OUTPATIENT
Start: 2020-07-29 | End: 2020-08-07

## 2020-07-29 RX ADMIN — MAGNESIUM OXIDE 400 MG (241.3 MG MAGNESIUM) TABLET 400 MG: TABLET at 08:12

## 2020-07-29 RX ADMIN — FUROSEMIDE 40 MG: 10 INJECTION, SOLUTION INTRAMUSCULAR; INTRAVENOUS at 08:14

## 2020-07-29 RX ADMIN — CALCIUM CARBONATE (ANTACID) CHEW TAB 500 MG 2 TABLET: 500 CHEW TAB at 08:12

## 2020-07-29 RX ADMIN — DIGOXIN 250 MCG: 250 TABLET ORAL at 12:03

## 2020-07-29 RX ADMIN — ZOLPIDEM TARTRATE 10 MG: 5 TABLET ORAL at 00:07

## 2020-07-29 RX ADMIN — HYDROCODONE BITARTRATE AND ACETAMINOPHEN 1 TABLET: 5; 325 TABLET ORAL at 06:15

## 2020-07-29 RX ADMIN — Medication 12.5 MG: at 08:12

## 2020-07-29 RX ADMIN — SACUBITRIL AND VALSARTAN 1 TABLET: 24; 26 TABLET, FILM COATED ORAL at 08:12

## 2020-07-29 RX ADMIN — METOPROLOL TARTRATE 12.5 MG: 25 TABLET, FILM COATED ORAL at 08:12

## 2020-07-29 RX ADMIN — PANTOPRAZOLE SODIUM 20 MG: 20 TABLET, DELAYED RELEASE ORAL at 08:12

## 2020-07-30 ENCOUNTER — READMISSION MANAGEMENT (OUTPATIENT)
Dept: CALL CENTER | Facility: HOSPITAL | Age: 40
End: 2020-07-30

## 2020-07-30 NOTE — OUTREACH NOTE
Prep Survey      Responses   [facility] facility patient discharged from?  [place]   Is LACE score < 7 ?  No   Eligibility  Readm Mgmt   Discharge diagnosis  Chest pain, CHF s/p ICD placement   COVID-19 Test Status  Negative   Does the patient have one of the following disease processes/diagnoses(primary or secondary)?  CHF   Does the patient have Home health ordered?  No   Is there a DME ordered?  No   Comments regarding appointments  Per AVS   Prep survey completed?  Yes          Yue Decker RN

## 2020-07-31 ENCOUNTER — READMISSION MANAGEMENT (OUTPATIENT)
Dept: CALL CENTER | Facility: HOSPITAL | Age: 40
End: 2020-07-31

## 2020-07-31 NOTE — OUTREACH NOTE
CHF Week 1 Survey      Responses   [facility] patient discharged from?  [place]   Does the patient have one of the following disease processes/diagnoses(primary or secondary)?  CHF   CHF Week 1 attempt successful?  Yes   Call start time  1427   Call end time  1436   Discharge diagnosis  Chest pain, CHF   Meds reviewed with patient/caregiver?  Yes   Is the patient having any side effects they believe may be caused by any medication additions or changes?  No   Does the patient have all medications ordered at discharge?  Yes   Is the patient taking all medications as directed (includes completed medication regime)?  Yes   Does the patient have a primary care provider?   Yes   Does the patient have an appointment with their PCP within 7 days of discharge?  Yes   Has the patient kept scheduled appointments due by today?  N/A   Pulse Ox monitoring  None   Psychosocial issues?  No   Comments  Pt reports SOA is better since DC. Incision is without s/sx of infection. Keeping his arm down from raising up to high.    Did the patient receive a copy of their discharge instructions?  Yes   Nursing interventions  Reviewed instructions with patient   What is the patient's perception of their health status since discharge?  Improving   Is the patient weighing daily?  Yes   Does the patient have scales?  Yes   Daily weight interventions  Education provided on importance of daily weight   Is the patient able to teach back Heart Failure diet management?  Yes   Is the patient able to teach back Heart Failure Zones?  Yes   Is the patient/caregiver able to teach back the hierarchy of who to call/visit for symptoms/problems? PCP, Specialist, Home health nurse, Urgent Care, ED, 911  Yes    CHF Week 1 call completed?  Yes          Celena Cheung RN

## 2020-08-11 ENCOUNTER — READMISSION MANAGEMENT (OUTPATIENT)
Dept: CALL CENTER | Facility: HOSPITAL | Age: 40
End: 2020-08-11

## 2020-08-11 NOTE — OUTREACH NOTE
CHF Week 2 Survey      Responses   [facility] [place] by [facility] patient discharged from?  [place]   Does the patient have one of the following disease processes/diagnoses(primary or secondary)?  CHF   Week 2 attempt successful?  Yes   Call start time  1221   Call end time  1224   Discharge diagnosis  Chest pain, CHF   Person spoke with today (if not patient) and relationship  Andrea-cheyenne father    Meds reviewed with patient/caregiver?  Yes   Is the patient having any side effects they believe may be caused by any medication additions or changes?  No   Does the patient have all medications ordered at discharge?  Yes   Is the patient taking all medications as directed (includes completed medication regime)?  Yes   Comments regarding appointments  Appt with heart and valve on 8/5/20,  appt with Dr. Perez on 8/20/20   Does the patient have a primary care provider?   Yes   Does the patient have an appointment with their PCP within 7 days of discharge?  Yes   Comments regarding PCP  8/5/20 (phone visit)   Has the patient kept scheduled appointments due by today?  Yes   Pulse Ox monitoring  Intermittent   Pulse Ox device source  Patient   Psychosocial issues?  No   Did the patient receive a copy of their discharge instructions?  Yes   Nursing interventions  Reviewed instructions with patient   What is the patient's perception of their health status since discharge?  Improving   Nursing interventions  Nurse provided patient education   Is the patient weighing daily?  Yes   Does the patient have scales?  Yes   Is the patient able to teach back Heart Failure Zones?  Yes   Is the patient able to teach back signs and symptoms of worsening condition? (i.e. weight gain, shortness of air, etc.)  Yes   If the patient is a current smoker, are they able to teach back resources for cessation?  Smoking cessation medications [Smoker]   Is the patient/caregiver able to teach back the hierarchy of who to call/visit for symptoms/problems? PCP,  Specialist, Home health nurse, Urgent Care, ED, 911  Yes   CHF Week 2 call completed?  Yes          Maureen Ramos RN

## 2020-08-17 ENCOUNTER — READMISSION MANAGEMENT (OUTPATIENT)
Dept: CALL CENTER | Facility: HOSPITAL | Age: 40
End: 2020-08-17

## 2020-08-17 NOTE — OUTREACH NOTE
CHF Week 3 Survey      Responses   [facility] patient discharged from?  [place]   Does the patient have one of the following disease processes/diagnoses(primary or secondary)?  CHF   Week 3 attempt successful?  No   Unsuccessful attempts  Attempt 1          Dejah Johnson LPN

## 2020-08-19 ENCOUNTER — READMISSION MANAGEMENT (OUTPATIENT)
Dept: CALL CENTER | Facility: HOSPITAL | Age: 40
End: 2020-08-19

## 2020-08-19 NOTE — OUTREACH NOTE
CHF Week 3 Survey      Responses   [facility] patient discharged from?  [place]   Does the patient have one of the following disease processes/diagnoses(primary or secondary)?  CHF   Week 3 attempt successful?  No   Unsuccessful attempts  Attempt 2          Celena Cheung RN

## 2020-08-28 ENCOUNTER — OFFICE VISIT (OUTPATIENT)
Dept: CARDIOLOGY | Facility: CLINIC | Age: 40
End: 2020-08-28

## 2020-08-28 VITALS
SYSTOLIC BLOOD PRESSURE: 130 MMHG | HEIGHT: 71 IN | OXYGEN SATURATION: 99 % | WEIGHT: 182 LBS | DIASTOLIC BLOOD PRESSURE: 70 MMHG | BODY MASS INDEX: 25.48 KG/M2 | HEART RATE: 99 BPM

## 2020-08-28 DIAGNOSIS — I50.22 CHRONIC SYSTOLIC CONGESTIVE HEART FAILURE (HCC): Primary | ICD-10-CM

## 2020-08-28 DIAGNOSIS — I42.8 NICM (NONISCHEMIC CARDIOMYOPATHY) (HCC): ICD-10-CM

## 2020-08-28 PROBLEM — I50.23 ACUTE ON CHRONIC HFREF (HEART FAILURE WITH REDUCED EJECTION FRACTION) (HCC): Status: RESOLVED | Noted: 2020-02-05 | Resolved: 2020-08-28

## 2020-08-28 PROBLEM — I50.9 ACUTE ON CHRONIC CONGESTIVE HEART FAILURE (HCC): Status: RESOLVED | Noted: 2020-07-23 | Resolved: 2020-08-28

## 2020-08-28 PROCEDURE — 99024 POSTOP FOLLOW-UP VISIT: CPT | Performed by: INTERNAL MEDICINE

## 2020-08-28 RX ORDER — CEPHALEXIN 500 MG/1
500 CAPSULE ORAL 2 TIMES DAILY
Qty: 10 CAPSULE | Refills: 0 | Status: SHIPPED | OUTPATIENT
Start: 2020-08-28 | End: 2020-09-02

## 2020-08-28 NOTE — PROGRESS NOTES
Didier Dahl  40 y.o. male    08/28/2020  1. Chronic systolic congestive heart failure (CMS/HCC)    2. NICM (nonischemic cardiomyopathy) (CMS/HCC)        History of Present Illness:    Patient's Body mass index is 25.4 kg/m². BMI is within normal parameters. No follow-up required..    Patient is a current tobacco user. I have educated the patient on the risks of continued tobacco use. Tobacco cessation education was given <3 min. Patient does not desire tobacco cessation at this time.     40 years old patient presented post hospital follow-up.  Patient is status post single-chamber ICD.  Clinically no sign of cardiac decompensation.  The patient will continue follow-up with Dr. Perez and will see him on.  Basis.  With a background history of hypertension, hypertensive heart disease, history of scoliosis, severe LV dysfunction status post cardiac catheterization in December 2019 at [facility] no CAD noted and CT pulmonary Ramakrishna was negative for pulmonary embolism right heart catheterization in December 2019 noted elevated reverse pressure and was scheduled to be evaluated for heart transplant at [facility] however due to COVID got canceled intermittent evaluation of shortness of breath currently on LifeVest, EKG sinus rhythm with a normal QRS durations and echocardiogram severe LV dysfunction with a dilated left atrium and evidence of PFO.    Date of Procedure: 07/28/20     Referring Physician: Dr. Perez     /ELECTROPHYSIOLOGIST          PROCEDURE(S) PERFORMED:    1. Implantation of a VVI implantable cardioverter-defibrillator.  2. Defibrillation threshold testing for ventricular fibrillation.      INDICATIONS FOR PROCDEDURE:            History of nonischemic cardiomyopathy persistent LV dysfunction on LifeVest waiting for transplant evaluation     Echo  · Left atrial cavity size is moderate-to-severely dilated.  · Mild mitral valve regurgitation is present  · Mild to moderate  tricuspid valve regurgitation is present.  · The left ventricular cavity is mildly dilated.  · Mildly reduced right ventricular systolic function noted.  · The following left ventricular wall segments are hypokinetic: mid anterior, apical anterior, basal anterolateral, mid anterolateral, apical lateral, basal inferolateral, mid inferolateral, apical inferior, mid inferior, apical septal, basal inferoseptal, mid inferoseptal, apex hypokinetic, mid anteroseptal, basal anterior, basal inferior and basal inferoseptal.        Concurrent medical problems  1.  Shortness of breath.  2.  Nonischemic cardiomyopathy with left ventricular dilatation with an ejection fraction of 10%.  3.  Left ventricular thrombus on anticoagulation with Coumadin.  4.  Coronary angiogram done at [facility] in December 2019 had revealed no evidence of any obstructive epicardial coronary artery disease.  5.  CTA of the chest done at [facility] did not reveal of any evidence of any pulmonary embolism.  6.  Right heart catheterization had revealed elevated pulmonary capillary wedge pressure in December 2019.  7.  LifeVest evaluation had revealed evidence of nonsustained ventricular tachycardia.  8.  History of arterial hypertension.  9.  Moderate tricuspid regurgitation with mild to moderate mitral regurgitation.  10.  Atrial septal defect with right-to-left shunting with positive bubble study.     SUBJECTIVE:    Allergies   Allergen Reactions   • Chantix [Varenicline] Anaphylaxis         Past Medical History:   Diagnosis Date   • Anxiety    • Elevated liver enzymes    • GERD (gastroesophageal reflux disease)    • Heart trouble     zoll vest defibrillator   • PE (pulmonary thromboembolism) (CMS/HCC)    • Pneumonia          Past Surgical History:   Procedure Laterality Date   • CARDIAC ELECTROPHYSIOLOGY PROCEDURE N/A 7/28/2020    Procedure: ICD SC new;  Surgeon: Ivis Gorman MD;  Location: [facility] CATH INVASIVE LOCATION;  Service:  Cardiology;  Laterality: N/A;   • VASCULAR SURGERY           Family History   Problem Relation Age of Onset   • Diabetes Other    • Heart disease Other    • Hypertension Other    • Anxiety disorder Other    • Allergies Other          Social History     Socioeconomic History   • Marital status: Single     Spouse name: Not on file   • Number of children: Not on file   • Years of education: Not on file   • Highest education level: Not on file   Tobacco Use   • Smoking status: Light Tobacco Smoker     Packs/day: 0.50     Types: Cigarettes     Last attempt to quit: 3/1/2020     Years since quittin.4   • Smokeless tobacco: Never Used   Substance and Sexual Activity   • Alcohol use: Yes     Comment: occ   • Drug use: No   • Sexual activity: Defer         Current Outpatient Medications   Medication Sig Dispense Refill   • albuterol sulfate  (90 Base) MCG/ACT inhaler Inhale 2 puffs Every 4 (Four) Hours As Needed for Wheezing. 1 inhaler 0   • Calcium Carbonate Antacid 1250 MG/5ML Take  by mouth.     • digoxin (LANOXIN) 250 MCG tablet Take 1 tablet by mouth Daily. 30 tablet 1   • furosemide (LASIX) 40 MG tablet Take 1 tablet by mouth 2 (Two) Times a Day. 60 tablet 1   • lisdexamfetamine (VYVANSE) 70 MG capsule Take 70 mg by mouth Every Morning Pt takes 1/2 in the morning and 1/2 in the afternoon      • Magnesium Oxide 400 (240 Mg) MG tablet Take 1 tablet by mouth 2 (Two) Times a Day. 60 tablet 1   • metoprolol tartrate (LOPRESSOR) 25 MG tablet Take 0.5 tablets by mouth Every 12 (Twelve) Hours. 15 tablet 1   • pantoprazole (PROTONIX) 20 MG EC tablet Take 20 mg by mouth Daily.     • sacubitril-valsartan (ENTRESTO) 24-26 MG tablet Take 1 tablet by mouth Every 12 (Twelve) Hours. 60 tablet 1   • spironolactone (ALDACTONE) 25 MG tablet Take 0.5 tablets by mouth Daily. 30 tablet 1   • vitamin D (ERGOCALCIFEROL) 1.25 MG (02039 UT) capsule capsule Take 50,000 Units by mouth Every 7 (Seven) Days.     • warfarin (COUMADIN) 5  "MG tablet Take 10 mg by mouth Every Night. Take 2 tablets (10 mg) by mouth nightly on MoFr     • warfarin (COUMADIN) 5 MG tablet Take 7.5 mg by mouth Every Night. Take one and one-half tablets (7.5 mg) by mouth nightly on TuWeThSaSu     • zolpidem (AMBIEN) 10 MG tablet Take 10 mg by mouth At Night As Needed for Sleep.       No current facility-administered medications for this visit.            Review of Systems:     Constitutional:  Denies recent weight loss, weight gain, fever or chills, no change in exercise tolerance.     HENT:  Denies any hearing loss, epistaxis, hoarseness, or difficulty speaking.     Eyes: Wears eyeglasses or contact lenses.    Respiratory:  Denies dyspnea with exertion,no cough, wheezing, or hemoptysis.     Cardiovascular: Negative for palpations, chest pain, orthopnea, PND, peripheral edema, syncope, or claudication.     Gastrointestinal:  Denies change in bowel habits, dyspepsia, ulcer disease, hematochezia, or melena.     Endocrine: Negative for cold intolerance, heat intolerance, polydipsia, polyphagia and polyuria. Denies any history of weight change, polydipsia, polyuria.     Genitourinary: Negative.      Musculoskeletal: Denies any history of arthritic symptoms or back problems.     Skin:  Denies any change in hair or nails, rashes, or skin lesions.     Allergic/Immunologic: Negative.  Negative for environmental allergies, food allergies and immunocompromised state.     Neurological:  Denies any history of recurrent headaches, strokes, TIA, or seizure disorder.     Hematological: Denies any food allergies, seasonal allergies, bleeding disorders, or lymphadenopathy.     Psychiatric/Behavioral: Denies any history of depression, substance abuse, or change in cognitive function.       OBJECTIVE:    /70   Pulse 99   Ht 180.3 cm (70.98\")   Wt 82.6 kg (182 lb)   SpO2 99%   BMI 25.40 kg/m²       Physical Exam:     Constitutional: Cooperative, alert and oriented, well-developed, " well-nourished, in no acute distress.     HENT:   Head: Normocephalic, normal hair patterns, no masses or tenderness.  Ears, Nose, and Throat: No gross abnormalities. No pallor or cyanosis. Dentition good.   Eyes: EOMS intact, PERRL, conjunctivae and lids unremarkable. Fundoscopic exam and visual fields not performed.   Neck: No palpable masses or adenopathy, no thyromegaly, no JVD, carotid pulses are full and equal bilaterally and without  Bruits.     Cardiovascular: Regular rhythm, S1 and S2 normal, no S3 or S4. Apical impulse not displaced. No murmurs, gallops, or rubs detected.     Pulmonary/Chest: Chest: normal symmetry, no tenderness to palpation, normal respiratory excursion, no intercostal retraction, no use of accessory muscles. Pulmonary: Normal breath sounds. No rales or rhonchi.    Abdominal: Abdomen soft, bowel sounds normoactive, no masses, no hepatosplenomegaly, non-tender, no bruits.     Musculoskeletal: No deformities, clubbing, cyanosis, erythema, or edema observed. There are no spinal abnormalities noted. Normal muscle strength and tone. Pulses full and equal in all extremities, no bruits auscultated.     Neurological: No gross motor or sensory deficits noted, affect appropriate, oriented to time, person, place.     Skin: Warm and dry to the touch, no apparent skin lesions or masses noted.     Psychiatric: He has a normal mood and affect. His behavior is normal. Judgment and thought content normal.         Procedures      Lab Results   Component Value Date    WBC 5.02 07/29/2020    HGB 12.5 (L) 07/29/2020    HCT 38.6 07/29/2020    MCV 88.5 07/29/2020     07/29/2020     Lab Results   Component Value Date    GLUCOSE 97 07/29/2020    BUN 16 07/29/2020    CREATININE 0.86 07/29/2020    EGFRIFNONA 100 07/29/2020    BCR 18.6 07/29/2020    CO2 23.0 07/29/2020    CALCIUM 8.6 07/29/2020    ALBUMIN 4.30 07/23/2020    LABIL2 1.6 12/25/2019    AST 44 (H) 07/23/2020    ALT 42 (H) 07/23/2020     No  results found for: CHOL  Lab Results   Component Value Date    TRIG 87 05/14/2020     No results found for: HDL  No components found for: LDLCALC  No results found for: LDL  No results found for: HDLLDLRATIO  No components found for: CHOLHDL  No results found for: HGBA1C  No results found for: TSH, P6TCWEJ, Y0YSXUQ, THYROIDAB        ASSESSMENT AND PLAN:  #1 nonischemic cardiomyopathy with a severe LV dysfunction     2 waiting for transplant evaluation at [facility]     #3 congestive heart failure due to severely reduced left 20 systolic function functional class class II-III the structural stage C     #4 hypertension with hypertensive heart disease      32 years old patient known to Dr. swenson with background history of nonischemic cardiomyopathy persistent LV dysfunction underwent single-chamber ICD as a part of bridge to transplant or destination therapy.  He presented for post hospital follow-up.  He is on appropriate medical management.  No signs or symptoms testing cardiac decompensation at the time of evaluation.  Site was inspected patient will continue digoxin, metoprolol, Entresto as a part of management of nonischemic cardiomyopathy.  Lasix with congestive heart failure compensated  Didier was seen today for follow-up.    Diagnoses and all orders for this visit:    Chronic systolic congestive heart failure (CMS/HCC)    NICM (nonischemic cardiomyopathy) (CMS/HCC)          Ivis Gorman MD  8/28/2020  12:02

## 2020-09-24 ENCOUNTER — OFFICE VISIT (OUTPATIENT)
Dept: SLEEP MEDICINE | Facility: HOSPITAL | Age: 40
End: 2020-09-24

## 2020-09-24 VITALS
BODY MASS INDEX: 25.06 KG/M2 | OXYGEN SATURATION: 99 % | HEIGHT: 71 IN | SYSTOLIC BLOOD PRESSURE: 123 MMHG | HEART RATE: 102 BPM | DIASTOLIC BLOOD PRESSURE: 89 MMHG | WEIGHT: 179 LBS

## 2020-09-24 DIAGNOSIS — G47.33 OSA (OBSTRUCTIVE SLEEP APNEA): ICD-10-CM

## 2020-09-24 DIAGNOSIS — I42.8 NICM (NONISCHEMIC CARDIOMYOPATHY) (HCC): Primary | ICD-10-CM

## 2020-09-24 DIAGNOSIS — Z45.02 ICD (IMPLANTABLE CARDIOVERTER-DEFIBRILLATOR) BATTERY DEPLETION: ICD-10-CM

## 2020-09-24 DIAGNOSIS — I50.22 CHRONIC SYSTOLIC CONGESTIVE HEART FAILURE (HCC): ICD-10-CM

## 2020-09-24 PROCEDURE — 99213 OFFICE O/P EST LOW 20 MIN: CPT | Performed by: INTERNAL MEDICINE

## 2020-09-24 NOTE — PROGRESS NOTES
Subjective   Didier Dahl is a 40 y.o. male.     History of Present Illness is a 40-year-old male with a recent diagnosis of obstructive sleep apnea.  He is on a CPAP machine but is having trouble complying with it because he is he says he wakes up at night gasping with racing of his heart.  ICD that was recently placed at  for ventricular fibrillation.  He has of heart failure and left ventricular cardiac dysfunction secondary to unknown causes.  As his current left ventricular ejection fraction is 20% went in for his ICD placement did not take his CPAP machine with him and missed his CPAP almost a 1 month.  Since discharge he has been using CPAP about out of 7 nights.  His pressure on the CPAP titrated is quite low he is running a mean pressure 5 cm water which is almost placebo week pressure is averaging about 7 cm of water.  View of the fact that CPAP has been well demonstrated to improve cardiac output in left ventricular failure, and the fact that the patient needs CPAP for his obstructive sleep apnea is very important to get the patient to use the CPAP nightly.  To convert him from variable pressure to a fixed pressure of 9 cm of water.  See him back in 4 weeks.  Ducted him to sit in front of the television while he is awake with his mask on for several hours to try to get used to the mask.  Leave he may be waking up at night and pulling his mask off because he is actually having an obstructive apnea.    The following portions of the patient's history were reviewed and updated as appropriate: allergies, current medications, past family history, past medical history, past social history, past surgical history and problem list.    Review of Systems   Constitutional: Negative.    HENT: Negative.    Eyes: Negative.    Respiratory: Negative.    Cardiovascular: Negative.    Gastrointestinal: Negative.    Endocrine: Negative.    Genitourinary: Negative.        Objective   Physical Exam  HENT:      Right  Ear: External ear normal.      Left Ear: External ear normal.      Nose: Nose normal.      Mouth/Throat:      Comments: Has grade 3 Mallampati obstruction with slightly elongated uvula low soft palate and narrow lateral pharyngeal diameter.  Eyes:      Extraocular Movements: Extraocular movements intact.      Conjunctiva/sclera: Conjunctivae normal.      Pupils: Pupils are equal, round, and reactive to light.   Neck:      Musculoskeletal: Normal range of motion and neck supple.   Cardiovascular:      Rate and Rhythm: Normal rate and regular rhythm.   Pulmonary:      Effort: Pulmonary effort is normal.   Abdominal:      General: Abdomen is flat. Bowel sounds are normal.      Palpations: Abdomen is soft. There is no mass.   Musculoskeletal:      Right lower leg: No edema.      Left lower leg: No edema.   Skin:     General: Skin is warm and dry.   Neurological:      General: No focal deficit present.      Mental Status: He is alert and oriented to person, place, and time.           Assessment/Plan   Problems Addressed this Visit        Cardiovascular and Mediastinum    NICM (nonischemic cardiomyopathy) (CMS/HCC) - Primary    Chronic systolic congestive heart failure (CMS/HCC)    Relevant Orders    BIPAP / CPAP Adjustment      Other Visit Diagnoses     CLAUDIO (obstructive sleep apnea)        Relevant Orders    BIPAP / CPAP Adjustment    ICD (implantable cardioverter-defibrillator) battery depletion          Diagnoses       Codes Comments    NICM (nonischemic cardiomyopathy) (CMS/HCC)    -  Primary ICD-10-CM: I42.8  ICD-9-CM: 425.4     Chronic systolic congestive heart failure (CMS/HCC)     ICD-10-CM: I50.22  ICD-9-CM: 428.22, 428.0     CLAUDIO (obstructive sleep apnea)     ICD-10-CM: G47.33  ICD-9-CM: 327.23     ICD (implantable cardioverter-defibrillator) battery depletion     ICD-10-CM: Z45.02  ICD-9-CM: V53.32             This document has been electronically signed by Giovanni Kimbrough MD on September 24, 2020 15:57  CDT

## 2020-10-12 ENCOUNTER — OFFICE VISIT (OUTPATIENT)
Dept: GASTROENTEROLOGY | Facility: CLINIC | Age: 40
End: 2020-10-12

## 2020-10-12 VITALS
BODY MASS INDEX: 24.78 KG/M2 | SYSTOLIC BLOOD PRESSURE: 115 MMHG | DIASTOLIC BLOOD PRESSURE: 80 MMHG | HEART RATE: 110 BPM | WEIGHT: 177 LBS | HEIGHT: 71 IN

## 2020-10-12 DIAGNOSIS — K21.00 GASTROESOPHAGEAL REFLUX DISEASE WITH ESOPHAGITIS WITHOUT HEMORRHAGE: ICD-10-CM

## 2020-10-12 DIAGNOSIS — D50.8 OTHER IRON DEFICIENCY ANEMIA: ICD-10-CM

## 2020-10-12 DIAGNOSIS — R74.8 ELEVATED LIVER ENZYMES: ICD-10-CM

## 2020-10-12 DIAGNOSIS — K74.00 HEPATIC FIBROSIS: Primary | ICD-10-CM

## 2020-10-12 PROCEDURE — 99214 OFFICE O/P EST MOD 30 MIN: CPT | Performed by: PHYSICIAN ASSISTANT

## 2020-10-12 RX ORDER — PANTOPRAZOLE SODIUM 40 MG/1
40 TABLET, DELAYED RELEASE ORAL DAILY
Qty: 30 TABLET | Refills: 5 | Status: SHIPPED | OUTPATIENT
Start: 2020-10-12

## 2020-10-12 NOTE — PROGRESS NOTES
Chief Complaint   Patient presents with   • Elevated Hepatic Enzymes   • Anemia   • Heartburn   • Hepatic Fibrosis       ENDO PROCEDURE ORDERED:    Subjective    Didier Dahl is a 40 y.o. male. he is here today for follow-up.    History of Present Illness    The patient is seen on a recheck of his elevated liver enzymes, GERD, hepatic fibrosis, F2/S1-S2/N0, anemia. Last seen 06/11/2020. The patient did not repeat the laboratories that I had requested. He states he is having occasional right lower quadrant groin pain related to his recent cardiac studies. He is having some breakthrough reflux on the 20 mg of Protonix and requested to have that increased. He denied dysphagia. Bowels are moving without blood or mucus. Weight is up 6.5 pounds since last visit. He has never had a colonoscopy. He does have a family history.    The patient was hospitalized 07/23-29/2020 with CHF. He did have an echocardiogram done on 07/24/2020 I believe that showed EF of less than 20%. He had a defibrillator pacemaker placed subsequent to that on the left. He had a CT angiogram of the chest on 07/23/2020. It showed cardiomegaly with left atrial and ventricular enlargement, old granulomas of the lungs, small amount of fluid adjacent to the liver with degenerative disc disease. Followup studies on 07/28/2020, chest x-ray showed pacemaker in the left chest. CBC showed anemia with hemoglobin of 12.7, hematocrit of 39.7. BMP showed BUN of 24, otherwise normal. INR 1.18. Laboratories on discharge day, CBC showed hemoglobin of 12.5, hematocrit of 38.6. Fairly normal BMP. INR 1.21.    ASSESSMENT/PLAN: Patient with chronic GERD. We will try increasing his Protonix to 40 mg daily. He was encouraged to avoid gastric irritants. We will see how he does with the new pacemaker. I suspect hopefully when his heart condition improves and strengthens he undergo further treatment for his heart. I suspect his liver will improve as I believe he does  have some cardiogenic liver disease causing his elevated numbers. I suspect that the perihepatic fluid was related to his heart failure and not so much to the liver, but we will need to follow very closely. I recommended follow up in a few months with CBC and REYES FibroSure prior. He is encouraged to follow up with cardiology. We will plan further pending clinical course and the results of the above.      The following portions of the patient's history were reviewed and updated as appropriate:   Past Medical History:   Diagnosis Date   • Anxiety    • Elevated liver enzymes    • GERD (gastroesophageal reflux disease)    • Heart trouble     zoll vest defibrillator   • PE (pulmonary thromboembolism) (CMS/HCC)    • Pneumonia      Past Surgical History:   Procedure Laterality Date   • CARDIAC ELECTROPHYSIOLOGY PROCEDURE N/A 2020    Procedure: ICD SC new;  Surgeon: Ivis Gorman MD;  Location: [facility] INVASIVE LOCATION;  Service: Cardiology;  Laterality: N/A;   • VASCULAR SURGERY       Family History   Problem Relation Age of Onset   • Diabetes Other    • Heart disease Other    • Hypertension Other    • Anxiety disorder Other    • Allergies Other        Allergies   Allergen Reactions   • Chantix [Varenicline] Anaphylaxis     Social History     Socioeconomic History   • Marital status: Single     Spouse name: Not on file   • Number of children: Not on file   • Years of education: Not on file   • Highest education level: Not on file   Tobacco Use   • Smoking status: Light Tobacco Smoker     Packs/day: 0.50     Types: Cigarettes     Last attempt to quit: 3/1/2020     Years since quittin.6   • Smokeless tobacco: Never Used   Substance and Sexual Activity   • Alcohol use: Yes     Comment: occ   • Drug use: No   • Sexual activity: Defer     Current Medications:  Prior to Admission medications    Medication Sig Start Date End Date Taking? Authorizing Provider   albuterol sulfate  (90 Base) MCG/ACT inhaler  "Inhale 2 puffs Every 4 (Four) Hours As Needed for Wheezing. 12/4/19  Yes Naun Herbert MD   Calcium Carbonate Antacid 1250 MG/5ML Take  by mouth.   Yes Lena Cabrera MD   digoxin (LANOXIN) 250 MCG tablet Take 1 tablet by mouth Daily. 2/9/20  Yes Ricardo Grubbs MD   furosemide (LASIX) 40 MG tablet Take 1 tablet by mouth 2 (Two) Times a Day. 2/8/20  Yes Ricardo Grubbs MD   lisdexamfetamine (VYVANSE) 70 MG capsule Take 70 mg by mouth Every Morning Pt takes 1/2 in the morning and 1/2 in the afternoon    Yes Lena Cabrera MD   Magnesium Oxide 400 (240 Mg) MG tablet Take 1 tablet by mouth 2 (Two) Times a Day. 2/8/20  Yes Ricardo Grubbs MD   metoprolol tartrate (LOPRESSOR) 25 MG tablet Take 0.5 tablets by mouth Every 12 (Twelve) Hours. 2/8/20  Yes Ricardo Grubbs MD   pantoprazole (PROTONIX) 20 MG EC tablet Take 20 mg by mouth Daily. 3/3/20  Yes Lnea Cabrera MD   sacubitril-valsartan (ENTRESTO) 24-26 MG tablet Take 1 tablet by mouth Every 12 (Twelve) Hours. 2/8/20  Yes Ricardo Grubbs MD   spironolactone (ALDACTONE) 25 MG tablet Take 0.5 tablets by mouth Daily. 2/9/20  Yes Ricardo Grubbs MD   vitamin D (ERGOCALCIFEROL) 1.25 MG (31945 UT) capsule capsule Take 50,000 Units by mouth Every 7 (Seven) Days.   Yes Lena Cabrera MD   warfarin (COUMADIN) 5 MG tablet Take 10 mg by mouth Every Night. Take 2 tablets (10 mg) by mouth nightly on MoFr   Yes Lena Cabrera MD   warfarin (COUMADIN) 5 MG tablet Take 7.5 mg by mouth Every Night. Take one and one-half tablets (7.5 mg) by mouth nightly on TuWeThSaSu   Yes Lena Cabrera MD   zolpidem (AMBIEN) 10 MG tablet Take 10 mg by mouth At Night As Needed for Sleep.   Yes Lena Cabrera MD     Review of Systems  Review of Systems       Objective    /80   Pulse 110   Ht 180.3 cm (71\")   Wt 80.3 kg (177 lb)   BMI 24.69 kg/m²   Physical Exam  Vitals signs and nursing note reviewed. "   Constitutional:       General: He is not in acute distress.     Appearance: He is well-developed.   HENT:      Head: Normocephalic and atraumatic.   Eyes:      Pupils: Pupils are equal, round, and reactive to light.   Neck:      Musculoskeletal: Normal range of motion.   Cardiovascular:      Rate and Rhythm: Normal rate and regular rhythm.      Heart sounds: Normal heart sounds.   Pulmonary:      Effort: Pulmonary effort is normal.      Breath sounds: Normal breath sounds.   Abdominal:      General: Bowel sounds are normal. There is no distension or abdominal bruit.      Palpations: Abdomen is soft. Abdomen is not rigid. There is no shifting dullness or mass.      Tenderness: There is abdominal tenderness. There is no guarding or rebound.      Hernia: No hernia is present. There is no hernia in the ventral area.      Comments: mild   Musculoskeletal: Normal range of motion.   Skin:     General: Skin is warm and dry.   Neurological:      Mental Status: He is alert and oriented to person, place, and time.   Psychiatric:         Behavior: Behavior normal.         Thought Content: Thought content normal.         Judgment: Judgment normal.       Assessment/Plan      1. Hepatic fibrosis    2. Elevated liver enzymes    3. Gastroesophageal reflux disease with esophagitis without hemorrhage    4. Other iron deficiency anemia    .   Didier was seen today for elevated hepatic enzymes, anemia, heartburn and hepatic fibrosis.    Diagnoses and all orders for this visit:    Hepatic fibrosis  -     CBC & Differential; Future  -     Ferritin; Future  -     Iron Profile; Future  -     REYES Fibrosure; Future  -     Protime-INR; Future    Elevated liver enzymes  -     CBC & Differential; Future  -     Ferritin; Future  -     Iron Profile; Future  -     REYES Fibrosure; Future  -     Protime-INR; Future    Gastroesophageal reflux disease with esophagitis without hemorrhage  -     CBC & Differential; Future  -     Ferritin; Future  -      Iron Profile; Future  -     REYES Fibrosure; Future  -     Protime-INR; Future    Other iron deficiency anemia  -     CBC & Differential; Future  -     Ferritin; Future  -     Iron Profile; Future  -     REYES Fibrosure; Future  -     Protime-INR; Future    Other orders  -     pantoprazole (Protonix) 40 MG EC tablet; Take 1 tablet by mouth Daily.        Orders placed during this encounter include:  Orders Placed This Encounter   Procedures   • Ferritin     Standing Status:   Future     Standing Expiration Date:   4/10/2021   • Iron Profile     Standing Status:   Future     Standing Expiration Date:   4/10/2021   • REYES Fibrosure     Standing Status:   Future     Standing Expiration Date:   4/10/2021   • Protime-INR     Standing Status:   Future     Standing Expiration Date:   4/10/2021   • CBC & Differential     Standing Status:   Future     Standing Expiration Date:   4/10/2021     Order Specific Question:   Manual Differential     Answer:   No       Medications prescribed:  New Medications Ordered This Visit   Medications   • pantoprazole (Protonix) 40 MG EC tablet     Sig: Take 1 tablet by mouth Daily.     Dispense:  30 tablet     Refill:  5       Requested Prescriptions     Signed Prescriptions Disp Refills   • pantoprazole (Protonix) 40 MG EC tablet 30 tablet 5     Sig: Take 1 tablet by mouth Daily.       Review and/or summary of lab tests, radiology, procedures, medications. Review and summary of old records and obtaining of history. The risks and benefits of my recommendations, as well as other treatment options were discussed with the patient today. Questions were answered.    Follow-up: Return in about 3 months (around 1/12/2021), or if symptoms worsen or fail to improve, for lab prior.     * Surgery not found *      This document has been electronically signed by Alejandro Hughes PA-C on October 12, 2020 18:39 CDT      Results for orders placed or performed during the hospital encounter of 07/23/20   Gold  Top - SST   Result Value Ref Range    Extra Tube Hold for add-ons.    Green Top (Gel)   Result Value Ref Range    Extra Tube Hold for add-ons.    COVID-19, [facility] IN-HOUSE, NP SWAB IN TRANSPORT MEDIA 8-10 HR TAT - Swab, Nasopharynx    Specimen: Nasopharynx; Swab   Result Value Ref Range    COVID19 Not Detected Not Detected - Ref. Range   CBC Auto Differential    Specimen: Blood   Result Value Ref Range    WBC 5.02 3.40 - 10.80 10*3/mm3    RBC 4.36 4.14 - 5.80 10*6/mm3    Hemoglobin 12.5 (L) 13.0 - 17.7 g/dL    Hematocrit 38.6 37.5 - 51.0 %    MCV 88.5 79.0 - 97.0 fL    MCH 28.7 26.6 - 33.0 pg    MCHC 32.4 31.5 - 35.7 g/dL    RDW 15.5 (H) 12.3 - 15.4 %    RDW-SD 50.4 37.0 - 54.0 fl    MPV 10.4 6.0 - 12.0 fL    Platelets 175 140 - 450 10*3/mm3    Neutrophil % 53.3 42.7 - 76.0 %    Lymphocyte % 31.7 19.6 - 45.3 %    Monocyte % 11.0 5.0 - 12.0 %    Eosinophil % 3.2 0.3 - 6.2 %    Basophil % 0.6 0.0 - 1.5 %    Immature Grans % 0.2 0.0 - 0.5 %    Neutrophils, Absolute 2.68 1.70 - 7.00 10*3/mm3    Lymphocytes, Absolute 1.59 0.70 - 3.10 10*3/mm3    Monocytes, Absolute 0.55 0.10 - 0.90 10*3/mm3    Eosinophils, Absolute 0.16 0.00 - 0.40 10*3/mm3    Basophils, Absolute 0.03 0.00 - 0.20 10*3/mm3    Immature Grans, Absolute 0.01 0.00 - 0.05 10*3/mm3    nRBC 0.0 0.0 - 0.2 /100 WBC   CBC Auto Differential    Specimen: Blood   Result Value Ref Range    WBC 4.38 3.40 - 10.80 10*3/mm3    RBC 4.47 4.14 - 5.80 10*6/mm3    Hemoglobin 12.7 (L) 13.0 - 17.7 g/dL    Hematocrit 39.7 37.5 - 51.0 %    MCV 88.8 79.0 - 97.0 fL    MCH 28.4 26.6 - 33.0 pg    MCHC 32.0 31.5 - 35.7 g/dL    RDW 15.5 (H) 12.3 - 15.4 %    RDW-SD 50.4 37.0 - 54.0 fl    MPV 10.3 6.0 - 12.0 fL    Platelets 182 140 - 450 10*3/mm3    Neutrophil % 54.2 42.7 - 76.0 %    Lymphocyte % 33.3 19.6 - 45.3 %    Monocyte % 8.2 5.0 - 12.0 %    Eosinophil % 3.4 0.3 - 6.2 %    Basophil % 0.7 0.0 - 1.5 %    Immature Grans % 0.2 0.0 - 0.5 %    Neutrophils, Absolute 2.37 1.70 - 7.00  10*3/mm3    Lymphocytes, Absolute 1.46 0.70 - 3.10 10*3/mm3    Monocytes, Absolute 0.36 0.10 - 0.90 10*3/mm3    Eosinophils, Absolute 0.15 0.00 - 0.40 10*3/mm3    Basophils, Absolute 0.03 0.00 - 0.20 10*3/mm3    Immature Grans, Absolute 0.01 0.00 - 0.05 10*3/mm3    nRBC 0.0 0.0 - 0.2 /100 WBC   CBC Auto Differential    Specimen: Blood   Result Value Ref Range    WBC 5.99 3.40 - 10.80 10*3/mm3    RBC 4.39 4.14 - 5.80 10*6/mm3    Hemoglobin 12.8 (L) 13.0 - 17.7 g/dL    Hematocrit 39.3 37.5 - 51.0 %    MCV 89.5 79.0 - 97.0 fL    MCH 29.2 26.6 - 33.0 pg    MCHC 32.6 31.5 - 35.7 g/dL    RDW 15.5 (H) 12.3 - 15.4 %    RDW-SD 50.4 37.0 - 54.0 fl    MPV 11.1 6.0 - 12.0 fL    Platelets 205 140 - 450 10*3/mm3    Neutrophil % 59.7 42.7 - 76.0 %    Lymphocyte % 27.9 19.6 - 45.3 %    Monocyte % 8.5 5.0 - 12.0 %    Eosinophil % 3.0 0.3 - 6.2 %    Basophil % 0.7 0.0 - 1.5 %    Immature Grans % 0.2 0.0 - 0.5 %    Neutrophils, Absolute 3.58 1.70 - 7.00 10*3/mm3    Lymphocytes, Absolute 1.67 0.70 - 3.10 10*3/mm3    Monocytes, Absolute 0.51 0.10 - 0.90 10*3/mm3    Eosinophils, Absolute 0.18 0.00 - 0.40 10*3/mm3    Basophils, Absolute 0.04 0.00 - 0.20 10*3/mm3    Immature Grans, Absolute 0.01 0.00 - 0.05 10*3/mm3    nRBC 0.0 0.0 - 0.2 /100 WBC   CBC Auto Differential    Specimen: Blood   Result Value Ref Range    WBC 4.77 3.40 - 10.80 10*3/mm3    RBC 4.60 4.14 - 5.80 10*6/mm3    Hemoglobin 13.2 13.0 - 17.7 g/dL    Hematocrit 40.9 37.5 - 51.0 %    MCV 88.9 79.0 - 97.0 fL    MCH 28.7 26.6 - 33.0 pg    MCHC 32.3 31.5 - 35.7 g/dL    RDW 15.4 12.3 - 15.4 %    RDW-SD 49.8 37.0 - 54.0 fl    MPV 10.6 6.0 - 12.0 fL    Platelets 186 140 - 450 10*3/mm3    Neutrophil % 45.3 42.7 - 76.0 %    Lymphocyte % 38.2 19.6 - 45.3 %    Monocyte % 11.7 5.0 - 12.0 %    Eosinophil % 3.8 0.3 - 6.2 %    Basophil % 0.8 0.0 - 1.5 %    Immature Grans % 0.2 0.0 - 0.5 %    Neutrophils, Absolute 2.16 1.70 - 7.00 10*3/mm3    Lymphocytes, Absolute 1.82 0.70 - 3.10  10*3/mm3    Monocytes, Absolute 0.56 0.10 - 0.90 10*3/mm3    Eosinophils, Absolute 0.18 0.00 - 0.40 10*3/mm3    Basophils, Absolute 0.04 0.00 - 0.20 10*3/mm3    Immature Grans, Absolute 0.01 0.00 - 0.05 10*3/mm3    nRBC 0.0 0.0 - 0.2 /100 WBC   CBC Auto Differential    Specimen: Blood   Result Value Ref Range    WBC 4.85 3.40 - 10.80 10*3/mm3    RBC 4.39 4.14 - 5.80 10*6/mm3    Hemoglobin 12.7 (L) 13.0 - 17.7 g/dL    Hematocrit 38.9 37.5 - 51.0 %    MCV 88.6 79.0 - 97.0 fL    MCH 28.9 26.6 - 33.0 pg    MCHC 32.6 31.5 - 35.7 g/dL    RDW 15.5 (H) 12.3 - 15.4 %    RDW-SD 49.6 37.0 - 54.0 fl    MPV 10.8 6.0 - 12.0 fL    Platelets 173 140 - 450 10*3/mm3    Neutrophil % 52.8 42.7 - 76.0 %    Lymphocyte % 32.4 19.6 - 45.3 %    Monocyte % 10.3 5.0 - 12.0 %    Eosinophil % 3.3 0.3 - 6.2 %    Basophil % 1.0 0.0 - 1.5 %    Immature Grans % 0.2 0.0 - 0.5 %    Neutrophils, Absolute 2.56 1.70 - 7.00 10*3/mm3    Lymphocytes, Absolute 1.57 0.70 - 3.10 10*3/mm3    Monocytes, Absolute 0.50 0.10 - 0.90 10*3/mm3    Eosinophils, Absolute 0.16 0.00 - 0.40 10*3/mm3    Basophils, Absolute 0.05 0.00 - 0.20 10*3/mm3    Immature Grans, Absolute 0.01 0.00 - 0.05 10*3/mm3    nRBC 0.0 0.0 - 0.2 /100 WBC     *Note: Due to a large number of results and/or encounters for the requested time period, some results have not been displayed. A complete set of results can be found in Results Review.       Some portions of this note have been dictated using voice recognition software and may contain errors and/or omissions.

## 2020-10-12 NOTE — PATIENT INSTRUCTIONS
"BMI for Adults  What is BMI?  Body mass index (BMI) is a number that is calculated from a person's weight and height. BMI can help estimate how much of a person's weight is composed of fat. BMI does not measure body fat directly. Rather, it is an alternative to procedures that directly measure body fat, which can be difficult and expensive.  BMI can help identify people who may be at higher risk for certain medical problems.  What are BMI measurements used for?  BMI is used as a screening tool to identify possible weight problems. It helps determine whether a person is obese, overweight, a healthy weight, or underweight.  BMI is useful for:  · Identifying a weight problem that may be related to a medical condition or may increase the risk for medical problems.  · Promoting changes, such as changes in diet and exercise, to help reach a healthy weight. BMI screening can be repeated to see if these changes are working.  How is BMI calculated?  BMI involves measuring your weight in relation to your height. Both height and weight are measured, and the BMI is calculated from those numbers. This can be done either in English ([place] or metric measurements. Note that charts and online BMI calculators are available to help you find your BMI quickly and easily without having to do these calculations yourself.  To calculate your BMI in English ([place] measurements:    1. Measure your weight in pounds (lb).  2. Multiply the number of pounds by 703.  ? For example, for a person who weighs 180 lb, multiply that number by 703, which equals 126,540.  3. Measure your height in inches. Then multiply that number by itself to get a measurement called \"inches squared.\"  ? For example, for a person who is 70 inches tall, the \"inches squared\" measurement is 70 inches x 70 inches, which equals 4,900 inches squared.  4. Divide the total from step 2 (number of lb x 703) by the total from step 3 (inches squared): 126,540 ÷ 4,900 = 25.8. This is " "your BMI.  To calculate your BMI in metric measurements:  1. Measure your weight in kilograms (kg).  2. Measure your height in meters (m). Then multiply that number by itself to get a measurement called \"meters squared.\"  ? For example, for a person who is 1.75 m tall, the \"meters squared\" measurement is 1.75 m x 1.75 m, which is equal to 3.1 meters squared.  3. Divide the number of kilograms (your weight) by the meters squared number. In this example: 70 ÷ 3.1 = 22.6. This is your BMI.  What do the results mean?  BMI charts are used to identify whether you are underweight, normal weight, overweight, or obese. The following guidelines will be used:  · Underweight: BMI less than 18.5.  · Normal weight: BMI between 18.5 and 24.9.  · Overweight: BMI between 25 and 29.9.  · Obese: BMI of 30 or above.  Keep these notes in mind:  · Weight includes both fat and muscle, so someone with a muscular build, such as an athlete, may have a BMI that is higher than 24.9. In cases like these, BMI is not an accurate measure of body fat.  · To determine if excess body fat is the cause of a BMI of 25 or higher, further assessments may need to be done by a health care provider.  · BMI is usually interpreted in the same way for men and women.  Where to find more information  For more information about BMI, including tools to quickly calculate your BMI, go to these websites:  · Centers for Disease Control and Prevention: www.cdc.gov  · American Heart Association: www.heart.org  · National Heart, Lung, and Blood [place]: www.nhlbi.nih.gov  Summary  · Body mass index (BMI) is a number that is calculated from a person's weight and height.  · BMI may help estimate how much of a person's weight is composed of fat. BMI can help identify those who may be at higher risk for certain medical problems.  · BMI can be measured using English measurements or metric measurements.  · BMI charts are used to identify whether you are underweight, normal " weight, overweight, or obese.  This information is not intended to replace advice given to you by your health care provider. Make sure you discuss any questions you have with your health care provider.  Document Released: 08/29/2005 Document Revised: 09/09/2020 Document Reviewed: 07/17/2020  Elsevier Patient Education © 2020 [employer]

## 2020-12-30 ENCOUNTER — HOSPITAL ENCOUNTER (EMERGENCY)
Facility: HOSPITAL | Age: 40
Discharge: HOME | End: 2020-12-30
Attending: EMERGENCY MEDICINE | Admitting: EMERGENCY MEDICINE

## 2020-12-30 ENCOUNTER — APPOINTMENT (OUTPATIENT)
Dept: GENERAL RADIOLOGY | Facility: HOSPITAL | Age: 40
End: 2020-12-30

## 2020-12-30 VITALS
WEIGHT: 182 LBS | DIASTOLIC BLOOD PRESSURE: 61 MMHG | TEMPERATURE: 97.5 F | RESPIRATION RATE: 16 BRPM | BODY MASS INDEX: 25.48 KG/M2 | OXYGEN SATURATION: 98 % | HEART RATE: 74 BPM | SYSTOLIC BLOOD PRESSURE: 97 MMHG | HEIGHT: 71 IN

## 2020-12-30 DIAGNOSIS — I50.9 CHRONIC CONGESTIVE HEART FAILURE, UNSPECIFIED HEART FAILURE TYPE (HCC): Primary | ICD-10-CM

## 2020-12-30 LAB
ALBUMIN SERPL-MCNC: 4.1 G/DL (ref 3.5–5.2)
ALBUMIN/GLOB SERPL: 1.6 G/DL
ALP SERPL-CCNC: 73 U/L (ref 39–117)
ALT SERPL W P-5'-P-CCNC: 56 U/L (ref 1–41)
ANION GAP SERPL CALCULATED.3IONS-SCNC: 7 MMOL/L (ref 5–15)
AST SERPL-CCNC: 64 U/L (ref 1–40)
BASOPHILS # BLD AUTO: 0.03 10*3/MM3 (ref 0–0.2)
BASOPHILS NFR BLD AUTO: 0.5 % (ref 0–1.5)
BILIRUB SERPL-MCNC: 1.9 MG/DL (ref 0–1.2)
BUN SERPL-MCNC: 15 MG/DL (ref 6–20)
BUN/CREAT SERPL: 15.8 (ref 7–25)
CALCIUM SPEC-SCNC: 10.1 MG/DL (ref 8.6–10.5)
CHLORIDE SERPL-SCNC: 99 MMOL/L (ref 98–107)
CO2 SERPL-SCNC: 29 MMOL/L (ref 22–29)
CREAT SERPL-MCNC: 0.95 MG/DL (ref 0.76–1.27)
DEPRECATED RDW RBC AUTO: 51.2 FL (ref 37–54)
DIGOXIN SERPL-MCNC: 1.1 NG/ML (ref 0.6–1.2)
EOSINOPHIL # BLD AUTO: 0.09 10*3/MM3 (ref 0–0.4)
EOSINOPHIL NFR BLD AUTO: 1.6 % (ref 0.3–6.2)
ERYTHROCYTE [DISTWIDTH] IN BLOOD BY AUTOMATED COUNT: 14.9 % (ref 12.3–15.4)
FLUAV RNA RESP QL NAA+PROBE: NOT DETECTED
FLUBV RNA RESP QL NAA+PROBE: NOT DETECTED
GFR SERPL CREATININE-BSD FRML MDRD: 89 ML/MIN/1.73
GLOBULIN UR ELPH-MCNC: 2.6 GM/DL
GLUCOSE SERPL-MCNC: 157 MG/DL (ref 65–99)
HCT VFR BLD AUTO: 41.2 % (ref 37.5–51)
HGB BLD-MCNC: 13.5 G/DL (ref 13–17.7)
HOLD SPECIMEN: NORMAL
HOLD SPECIMEN: NORMAL
IMM GRANULOCYTES # BLD AUTO: 0.01 10*3/MM3 (ref 0–0.05)
IMM GRANULOCYTES NFR BLD AUTO: 0.2 % (ref 0–0.5)
INR PPP: 1.12 (ref 0.8–1.2)
LYMPHOCYTES # BLD AUTO: 1.03 10*3/MM3 (ref 0.7–3.1)
LYMPHOCYTES NFR BLD AUTO: 18.4 % (ref 19.6–45.3)
MAGNESIUM SERPL-MCNC: 1.9 MG/DL (ref 1.6–2.6)
MCH RBC QN AUTO: 31 PG (ref 26.6–33)
MCHC RBC AUTO-ENTMCNC: 32.8 G/DL (ref 31.5–35.7)
MCV RBC AUTO: 94.5 FL (ref 79–97)
MONOCYTES # BLD AUTO: 0.5 10*3/MM3 (ref 0.1–0.9)
MONOCYTES NFR BLD AUTO: 8.9 % (ref 5–12)
NEUTROPHILS NFR BLD AUTO: 3.94 10*3/MM3 (ref 1.7–7)
NEUTROPHILS NFR BLD AUTO: 70.4 % (ref 42.7–76)
NRBC BLD AUTO-RTO: 0 /100 WBC (ref 0–0.2)
NT-PROBNP SERPL-MCNC: 4822 PG/ML (ref 0–450)
PLATELET # BLD AUTO: 199 10*3/MM3 (ref 140–450)
PMV BLD AUTO: 10.1 FL (ref 6–12)
POTASSIUM SERPL-SCNC: 4.9 MMOL/L (ref 3.5–5.2)
PROT SERPL-MCNC: 6.7 G/DL (ref 6–8.5)
PROTHROMBIN TIME: 14.9 SECONDS (ref 11.1–15.3)
RBC # BLD AUTO: 4.36 10*6/MM3 (ref 4.14–5.8)
SARS-COV-2 RNA RESP QL NAA+PROBE: NOT DETECTED
SODIUM SERPL-SCNC: 135 MMOL/L (ref 136–145)
TROPONIN T SERPL-MCNC: <0.01 NG/ML (ref 0–0.03)
WBC # BLD AUTO: 5.6 10*3/MM3 (ref 3.4–10.8)
WHOLE BLOOD HOLD SPECIMEN: NORMAL
WHOLE BLOOD HOLD SPECIMEN: NORMAL

## 2020-12-30 PROCEDURE — 99283 EMERGENCY DEPT VISIT LOW MDM: CPT

## 2020-12-30 PROCEDURE — 85025 COMPLETE CBC W/AUTO DIFF WBC: CPT

## 2020-12-30 PROCEDURE — 93010 ELECTROCARDIOGRAM REPORT: CPT | Performed by: INTERNAL MEDICINE

## 2020-12-30 PROCEDURE — 83735 ASSAY OF MAGNESIUM: CPT | Performed by: EMERGENCY MEDICINE

## 2020-12-30 PROCEDURE — 84484 ASSAY OF TROPONIN QUANT: CPT | Performed by: EMERGENCY MEDICINE

## 2020-12-30 PROCEDURE — 87636 SARSCOV2 & INF A&B AMP PRB: CPT | Performed by: EMERGENCY MEDICINE

## 2020-12-30 PROCEDURE — 83880 ASSAY OF NATRIURETIC PEPTIDE: CPT | Performed by: EMERGENCY MEDICINE

## 2020-12-30 PROCEDURE — 93005 ELECTROCARDIOGRAM TRACING: CPT | Performed by: EMERGENCY MEDICINE

## 2020-12-30 PROCEDURE — 71045 X-RAY EXAM CHEST 1 VIEW: CPT

## 2020-12-30 PROCEDURE — 80053 COMPREHEN METABOLIC PANEL: CPT | Performed by: EMERGENCY MEDICINE

## 2020-12-30 PROCEDURE — 93005 ELECTROCARDIOGRAM TRACING: CPT

## 2020-12-30 PROCEDURE — 85610 PROTHROMBIN TIME: CPT

## 2020-12-30 PROCEDURE — 80162 ASSAY OF DIGOXIN TOTAL: CPT | Performed by: EMERGENCY MEDICINE

## 2020-12-30 RX ORDER — SODIUM CHLORIDE 0.9 % (FLUSH) 0.9 %
10 SYRINGE (ML) INJECTION AS NEEDED
Status: DISCONTINUED | OUTPATIENT
Start: 2020-12-30 | End: 2020-12-31 | Stop reason: HOSPADM

## 2020-12-30 RX ADMIN — Medication 10 ML: at 19:59

## 2020-12-31 LAB
QT INTERVAL: 356 MS
QTC INTERVAL: 389 MS

## 2021-01-13 ENCOUNTER — OFFICE VISIT (OUTPATIENT)
Dept: GASTROENTEROLOGY | Facility: CLINIC | Age: 41
End: 2021-01-13

## 2021-01-13 VITALS
WEIGHT: 181.8 LBS | HEIGHT: 71 IN | HEART RATE: 102 BPM | DIASTOLIC BLOOD PRESSURE: 79 MMHG | SYSTOLIC BLOOD PRESSURE: 111 MMHG | BODY MASS INDEX: 25.45 KG/M2

## 2021-01-13 DIAGNOSIS — D50.8 OTHER IRON DEFICIENCY ANEMIA: ICD-10-CM

## 2021-01-13 DIAGNOSIS — R74.8 ELEVATED LIVER ENZYMES: ICD-10-CM

## 2021-01-13 DIAGNOSIS — K21.00 GASTROESOPHAGEAL REFLUX DISEASE WITH ESOPHAGITIS WITHOUT HEMORRHAGE: ICD-10-CM

## 2021-01-13 DIAGNOSIS — K74.00 HEPATIC FIBROSIS: Primary | ICD-10-CM

## 2021-01-13 PROCEDURE — 99213 OFFICE O/P EST LOW 20 MIN: CPT | Performed by: PHYSICIAN ASSISTANT

## 2021-01-13 NOTE — PATIENT INSTRUCTIONS
"BMI for Adults  What is BMI?  Body mass index (BMI) is a number that is calculated from a person's weight and height. BMI can help estimate how much of a person's weight is composed of fat. BMI does not measure body fat directly. Rather, it is an alternative to procedures that directly measure body fat, which can be difficult and expensive.  BMI can help identify people who may be at higher risk for certain medical problems.  What are BMI measurements used for?  BMI is used as a screening tool to identify possible weight problems. It helps determine whether a person is obese, overweight, a healthy weight, or underweight.  BMI is useful for:  · Identifying a weight problem that may be related to a medical condition or may increase the risk for medical problems.  · Promoting changes, such as changes in diet and exercise, to help reach a healthy weight. BMI screening can be repeated to see if these changes are working.  How is BMI calculated?  BMI involves measuring your weight in relation to your height. Both height and weight are measured, and the BMI is calculated from those numbers. This can be done either in English ([place] or metric measurements. Note that charts and online BMI calculators are available to help you find your BMI quickly and easily without having to do these calculations yourself.  To calculate your BMI in English ([place] measurements:    1. Measure your weight in pounds (lb).  2. Multiply the number of pounds by 703.  ? For example, for a person who weighs 180 lb, multiply that number by 703, which equals 126,540.  3. Measure your height in inches. Then multiply that number by itself to get a measurement called \"inches squared.\"  ? For example, for a person who is 70 inches tall, the \"inches squared\" measurement is 70 inches x 70 inches, which equals 4,900 inches squared.  4. Divide the total from step 2 (number of lb x 703) by the total from step 3 (inches squared): 126,540 ÷ 4,900 = 25.8. This is " "your BMI.  To calculate your BMI in metric measurements:  1. Measure your weight in kilograms (kg).  2. Measure your height in meters (m). Then multiply that number by itself to get a measurement called \"meters squared.\"  ? For example, for a person who is 1.75 m tall, the \"meters squared\" measurement is 1.75 m x 1.75 m, which is equal to 3.1 meters squared.  3. Divide the number of kilograms (your weight) by the meters squared number. In this example: 70 ÷ 3.1 = 22.6. This is your BMI.  What do the results mean?  BMI charts are used to identify whether you are underweight, normal weight, overweight, or obese. The following guidelines will be used:  · Underweight: BMI less than 18.5.  · Normal weight: BMI between 18.5 and 24.9.  · Overweight: BMI between 25 and 29.9.  · Obese: BMI of 30 or above.  Keep these notes in mind:  · Weight includes both fat and muscle, so someone with a muscular build, such as an athlete, may have a BMI that is higher than 24.9. In cases like these, BMI is not an accurate measure of body fat.  · To determine if excess body fat is the cause of a BMI of 25 or higher, further assessments may need to be done by a health care provider.  · BMI is usually interpreted in the same way for men and women.  Where to find more information  For more information about BMI, including tools to quickly calculate your BMI, go to these websites:  · Centers for Disease Control and Prevention: www.cdc.gov  · American Heart Association: www.heart.org  · National Heart, Lung, and Blood [place]: www.nhlbi.nih.gov  Summary  · Body mass index (BMI) is a number that is calculated from a person's weight and height.  · BMI may help estimate how much of a person's weight is composed of fat. BMI can help identify those who may be at higher risk for certain medical problems.  · BMI can be measured using English measurements or metric measurements.  · BMI charts are used to identify whether you are underweight, normal " weight, overweight, or obese.  This information is not intended to replace advice given to you by your health care provider. Make sure you discuss any questions you have with your health care provider.  Document Revised: 09/09/2020 Document Reviewed: 07/17/2020  Elsevier Patient Education © 2020 [employer]

## 2021-01-13 NOTE — PROGRESS NOTES
Chief Complaint   Patient presents with   • Hepatic Fibrosis   • Elevated Hepatic Enzymes       ENDO PROCEDURE ORDERED:    Subjective    Didier Dahl is a 41 y.o. male. he is here today for follow-up.    History of Present Illness    The patient was seen on a recheck of his GERD, hepatic fibrosis with elevated liver enzymes, abdominal pain, F2/S1-S2/N1.  Last seen 10/12/2020.  At that time, increased his Protonix to 40 mg daily.  He forgot to get his laboratories for me.  He states the GERD is doing better.  He has a lot of gas.  Bowels are moving without blood or mucus.  Weight is up 4.8 pounds since last visit.  He has not had a colonoscopy.  Studies on 12/30/2020, he was seen for CHF.  Chest x-ray showed cardiac enlargement.  Covid-19 was negative.  BNP 4822.  Normal magnesium, troponin, digoxin level.  INR 1.12.  CMP showed glucose 157, sodium 135, AST 64, ALT 56, total bilirubin 1.9, otherwise normal.  CBC showed anemia with hemoglobin 13.5, otherwise normal.    ASSESSMENT/PLAN:  Patient with chronic GERD with steatohepatitis with mild elevation in transaminases and bilirubin secondary to above.  Encouraged dietary modification and weight loss, avoiding fructose.  We will continue on the Protonix for chronic GERD.  He was encouraged to follow up with his other care providers.  Recommend followup in 3 months with REYES Fibrosure, CMP, INR prior.  Further pending clinical course and the results of the above.      The following portions of the patient's history were reviewed and updated as appropriate:   Past Medical History:   Diagnosis Date   • Anxiety    • Elevated liver enzymes    • GERD (gastroesophageal reflux disease)    • Heart trouble     zoll vest defibrillator   • PE (pulmonary thromboembolism) (CMS/HCC)    • Pneumonia      Past Surgical History:   Procedure Laterality Date   • CARDIAC ELECTROPHYSIOLOGY PROCEDURE N/A 7/28/2020    Procedure: ICD SC new;  Surgeon: Ivis Gorman MD;  Location:  [facility] INVASIVE LOCATION;  Service: Cardiology;  Laterality: N/A;   • VASCULAR SURGERY       Family History   Problem Relation Age of Onset   • Diabetes Other    • Heart disease Other    • Hypertension Other    • Anxiety disorder Other    • Allergies Other        Allergies   Allergen Reactions   • Chantix [Varenicline] Anaphylaxis     Social History     Socioeconomic History   • Marital status: Single     Spouse name: Not on file   • Number of children: Not on file   • Years of education: Not on file   • Highest education level: Not on file   Tobacco Use   • Smoking status: Light Tobacco Smoker     Packs/day: 0.50     Types: Cigarettes     Last attempt to quit: 3/1/2020     Years since quittin.9   • Smokeless tobacco: Never Used   Substance and Sexual Activity   • Alcohol use: Yes     Comment: occ   • Drug use: No   • Sexual activity: Defer     Current Medications:  Prior to Admission medications    Medication Sig Start Date End Date Taking? Authorizing Provider   albuterol sulfate  (90 Base) MCG/ACT inhaler Inhale 2 puffs Every 4 (Four) Hours As Needed for Wheezing. 19  Yes Naun Herbert MD   Calcium Carbonate Antacid 1250 MG/5ML Take  by mouth.   Yes ProviderLena MD   digoxin (LANOXIN) 250 MCG tablet Take 1 tablet by mouth Daily. 20  Yes Ricardo Grubbs MD   furosemide (LASIX) 40 MG tablet Take 1 tablet by mouth 2 (Two) Times a Day. 20  Yes Ricardo Grubbs MD   lisdexamfetamine (VYVANSE) 70 MG capsule Take 70 mg by mouth Every Morning Pt takes 1/2 in the morning and 1/2 in the afternoon    Yes ProviderLena MD   Magnesium Oxide 400 (240 Mg) MG tablet Take 1 tablet by mouth 2 (Two) Times a Day. 20  Yes Ricardo Grubbs MD   metoprolol tartrate (LOPRESSOR) 25 MG tablet Take 0.5 tablets by mouth Every 12 (Twelve) Hours. 20  Yes Ricardo Grubbs MD   pantoprazole (Protonix) 40 MG EC tablet Take 1 tablet by mouth Daily. 10/12/20  Yes Saul  "Alejandro BURDEN PA-C   sacubitril-valsartan (ENTRESTO) 24-26 MG tablet Take 1 tablet by mouth Every 12 (Twelve) Hours. 2/8/20  Yes Ricardo Grubbs MD   spironolactone (ALDACTONE) 25 MG tablet Take 0.5 tablets by mouth Daily. 2/9/20  Yes Ricardo Grubbs MD   vitamin D (ERGOCALCIFEROL) 1.25 MG (00185 UT) capsule capsule Take 50,000 Units by mouth Every 7 (Seven) Days.   Yes Lena Cabrera MD   warfarin (COUMADIN) 5 MG tablet Take 10 mg by mouth Every Night. Take 2 tablets (10 mg) by mouth nightly on MoFr   Yes Lena Cabrera MD   warfarin (COUMADIN) 5 MG tablet Take 7.5 mg by mouth Every Night. Take one and one-half tablets (7.5 mg) by mouth nightly on TuWeThSaSu   Yes Lena Cabrera MD   zolpidem (AMBIEN) 10 MG tablet Take 10 mg by mouth At Night As Needed for Sleep.   Yes Lena Cabrera MD     Review of Systems  Review of Systems       Objective    /79   Pulse 102   Ht 180.3 cm (71\")   Wt 82.5 kg (181 lb 12.8 oz)   BMI 25.36 kg/m²   Physical Exam  Vitals signs and nursing note reviewed.   Constitutional:       General: He is not in acute distress.     Appearance: He is well-developed.   HENT:      Head: Normocephalic and atraumatic.   Eyes:      Pupils: Pupils are equal, round, and reactive to light.   Neck:      Musculoskeletal: Normal range of motion.   Cardiovascular:      Rate and Rhythm: Normal rate and regular rhythm.      Heart sounds: Normal heart sounds.   Pulmonary:      Effort: Pulmonary effort is normal.      Breath sounds: Normal breath sounds.   Abdominal:      General: Bowel sounds are normal. There is no distension or abdominal bruit.      Palpations: Abdomen is soft. Abdomen is not rigid. There is no shifting dullness or mass.      Tenderness: There is abdominal tenderness. There is no guarding or rebound.      Hernia: No hernia is present. There is no hernia in the ventral area.      Comments: mild   Musculoskeletal: Normal range of motion.   Skin:     " General: Skin is warm and dry.   Neurological:      Mental Status: He is alert and oriented to person, place, and time.   Psychiatric:         Behavior: Behavior normal.         Thought Content: Thought content normal.         Judgment: Judgment normal.       Assessment/Plan      1. Hepatic fibrosis    2. Elevated liver enzymes    3. Gastroesophageal reflux disease with esophagitis without hemorrhage    4. Other iron deficiency anemia    .   Diagnoses and all orders for this visit:    1. Hepatic fibrosis (Primary)  -     Comprehensive Metabolic Panel; Future  -     REYES Fibrosure; Future  -     Protime-INR; Future    2. Elevated liver enzymes  -     Comprehensive Metabolic Panel; Future  -     REYES Fibrosure; Future  -     Protime-INR; Future    3. Gastroesophageal reflux disease with esophagitis without hemorrhage  -     Comprehensive Metabolic Panel; Future  -     REYES Fibrosure; Future  -     Protime-INR; Future    4. Other iron deficiency anemia  -     Comprehensive Metabolic Panel; Future  -     REYES Fibrosure; Future  -     Protime-INR; Future        Orders placed during this encounter include:  Orders Placed This Encounter   Procedures   • Comprehensive Metabolic Panel     Standing Status:   Future     Standing Expiration Date:   7/12/2021   • REYES Fibrosure     Standing Status:   Future     Standing Expiration Date:   7/12/2021   • Protime-INR     Standing Status:   Future     Standing Expiration Date:   7/12/2021       Medications prescribed:  No orders of the defined types were placed in this encounter.      Requested Prescriptions      No prescriptions requested or ordered in this encounter       Review and/or summary of lab tests, radiology, procedures, medications. Review and summary of old records and obtaining of history. The risks and benefits of my recommendations, as well as other treatment options were discussed with the patient today. Questions were answered.    Follow-up: Return in about 3  months (around 4/13/2021), or if symptoms worsen or fail to improve, for lab prior.     * Surgery not found *      This document has been electronically signed by Alejandro Hughes PA-C on January 27, 2021 17:54 CST      Results for orders placed or performed during the hospital encounter of 12/30/20   Gold Top - SST   Result Value Ref Range    Extra Tube Hold for add-ons.    Green Top (Gel)   Result Value Ref Range    Extra Tube Hold for add-ons.    COVID-19 and FLU A/B PCR - Swab, Nasopharynx    Specimen: Nasopharynx; Swab   Result Value Ref Range    COVID19 Not Detected Not Detected - Ref. Range    Influenza A PCR Not Detected Not Detected    Influenza B PCR Not Detected Not Detected   CBC Auto Differential    Specimen: Blood   Result Value Ref Range    WBC 5.60 3.40 - 10.80 10*3/mm3    RBC 4.36 4.14 - 5.80 10*6/mm3    Hemoglobin 13.5 13.0 - 17.7 g/dL    Hematocrit 41.2 37.5 - 51.0 %    MCV 94.5 79.0 - 97.0 fL    MCH 31.0 26.6 - 33.0 pg    MCHC 32.8 31.5 - 35.7 g/dL    RDW 14.9 12.3 - 15.4 %    RDW-SD 51.2 37.0 - 54.0 fl    MPV 10.1 6.0 - 12.0 fL    Platelets 199 140 - 450 10*3/mm3    Neutrophil % 70.4 42.7 - 76.0 %    Lymphocyte % 18.4 (L) 19.6 - 45.3 %    Monocyte % 8.9 5.0 - 12.0 %    Eosinophil % 1.6 0.3 - 6.2 %    Basophil % 0.5 0.0 - 1.5 %    Immature Grans % 0.2 0.0 - 0.5 %    Neutrophils, Absolute 3.94 1.70 - 7.00 10*3/mm3    Lymphocytes, Absolute 1.03 0.70 - 3.10 10*3/mm3    Monocytes, Absolute 0.50 0.10 - 0.90 10*3/mm3    Eosinophils, Absolute 0.09 0.00 - 0.40 10*3/mm3    Basophils, Absolute 0.03 0.00 - 0.20 10*3/mm3    Immature Grans, Absolute 0.01 0.00 - 0.05 10*3/mm3    nRBC 0.0 0.0 - 0.2 /100 WBC   Lavender Top   Result Value Ref Range    Extra Tube hold for add-on    Light Blue Top   Result Value Ref Range    Extra Tube hold for add-on    Troponin    Specimen: Blood   Result Value Ref Range    Troponin T <0.010 0.000 - 0.030 ng/mL   Protime-INR    Specimen: Blood   Result Value Ref Range    Protime  14.9 11.1 - 15.3 Seconds    INR 1.12 0.80 - 1.20   BNP    Specimen: Blood   Result Value Ref Range    proBNP 4,822.0 (H) 0.0 - 450.0 pg/mL   Magnesium    Specimen: Blood   Result Value Ref Range    Magnesium 1.9 1.6 - 2.6 mg/dL   Digoxin Level    Specimen: Blood   Result Value Ref Range    Digoxin 1.10 0.60 - 1.20 ng/mL   Comprehensive Metabolic Panel    Specimen: Blood   Result Value Ref Range    Glucose 157 (H) 65 - 99 mg/dL    BUN 15 6 - 20 mg/dL    Creatinine 0.95 0.76 - 1.27 mg/dL    Sodium 135 (L) 136 - 145 mmol/L    Potassium 4.9 3.5 - 5.2 mmol/L    Chloride 99 98 - 107 mmol/L    CO2 29.0 22.0 - 29.0 mmol/L    Calcium 10.1 8.6 - 10.5 mg/dL    Total Protein 6.7 6.0 - 8.5 g/dL    Albumin 4.10 3.50 - 5.20 g/dL    ALT (SGPT) 56 (H) 1 - 41 U/L    AST (SGOT) 64 (H) 1 - 40 U/L    Alkaline Phosphatase 73 39 - 117 U/L    Total Bilirubin 1.9 (H) 0.0 - 1.2 mg/dL    eGFR Non African Amer 89 >60 mL/min/1.73    Globulin 2.6 gm/dL    A/G Ratio 1.6 g/dL    BUN/Creatinine Ratio 15.8 7.0 - 25.0    Anion Gap 7.0 5.0 - 15.0 mmol/L   ECG 12 Lead   Result Value Ref Range    QT Interval 356 ms    QTC Interval 389 ms   Results for orders placed or performed during the hospital encounter of 07/23/20   Gold Top - SST   Result Value Ref Range    Extra Tube Hold for add-ons.    Green Top (Gel)   Result Value Ref Range    Extra Tube Hold for add-ons.    COVID-19, [facility] IN-HOUSE, NP SWAB IN TRANSPORT MEDIA 8-10 HR TAT - Swab, Nasopharynx    Specimen: Nasopharynx; Swab   Result Value Ref Range    COVID19 Not Detected Not Detected - Ref. Range   CBC Auto Differential    Specimen: Blood   Result Value Ref Range    WBC 5.02 3.40 - 10.80 10*3/mm3    RBC 4.36 4.14 - 5.80 10*6/mm3    Hemoglobin 12.5 (L) 13.0 - 17.7 g/dL    Hematocrit 38.6 37.5 - 51.0 %    MCV 88.5 79.0 - 97.0 fL    MCH 28.7 26.6 - 33.0 pg    MCHC 32.4 31.5 - 35.7 g/dL    RDW 15.5 (H) 12.3 - 15.4 %    RDW-SD 50.4 37.0 - 54.0 fl    MPV 10.4 6.0 - 12.0 fL    Platelets 175 140 -  450 10*3/mm3    Neutrophil % 53.3 42.7 - 76.0 %    Lymphocyte % 31.7 19.6 - 45.3 %    Monocyte % 11.0 5.0 - 12.0 %    Eosinophil % 3.2 0.3 - 6.2 %    Basophil % 0.6 0.0 - 1.5 %    Immature Grans % 0.2 0.0 - 0.5 %    Neutrophils, Absolute 2.68 1.70 - 7.00 10*3/mm3    Lymphocytes, Absolute 1.59 0.70 - 3.10 10*3/mm3    Monocytes, Absolute 0.55 0.10 - 0.90 10*3/mm3    Eosinophils, Absolute 0.16 0.00 - 0.40 10*3/mm3    Basophils, Absolute 0.03 0.00 - 0.20 10*3/mm3    Immature Grans, Absolute 0.01 0.00 - 0.05 10*3/mm3    nRBC 0.0 0.0 - 0.2 /100 WBC   CBC Auto Differential    Specimen: Blood   Result Value Ref Range    WBC 4.38 3.40 - 10.80 10*3/mm3    RBC 4.47 4.14 - 5.80 10*6/mm3    Hemoglobin 12.7 (L) 13.0 - 17.7 g/dL    Hematocrit 39.7 37.5 - 51.0 %    MCV 88.8 79.0 - 97.0 fL    MCH 28.4 26.6 - 33.0 pg    MCHC 32.0 31.5 - 35.7 g/dL    RDW 15.5 (H) 12.3 - 15.4 %    RDW-SD 50.4 37.0 - 54.0 fl    MPV 10.3 6.0 - 12.0 fL    Platelets 182 140 - 450 10*3/mm3    Neutrophil % 54.2 42.7 - 76.0 %    Lymphocyte % 33.3 19.6 - 45.3 %    Monocyte % 8.2 5.0 - 12.0 %    Eosinophil % 3.4 0.3 - 6.2 %    Basophil % 0.7 0.0 - 1.5 %    Immature Grans % 0.2 0.0 - 0.5 %    Neutrophils, Absolute 2.37 1.70 - 7.00 10*3/mm3    Lymphocytes, Absolute 1.46 0.70 - 3.10 10*3/mm3    Monocytes, Absolute 0.36 0.10 - 0.90 10*3/mm3    Eosinophils, Absolute 0.15 0.00 - 0.40 10*3/mm3    Basophils, Absolute 0.03 0.00 - 0.20 10*3/mm3    Immature Grans, Absolute 0.01 0.00 - 0.05 10*3/mm3    nRBC 0.0 0.0 - 0.2 /100 WBC     *Note: Due to a large number of results and/or encounters for the requested time period, some results have not been displayed. A complete set of results can be found in Results Review.       Some portions of this note have been dictated using voice recognition software and may contain errors and/or omissions.

## 2021-05-18 ENCOUNTER — DOCUMENTATION (OUTPATIENT)
Dept: CARDIAC REHAB | Facility: HOSPITAL | Age: 41
End: 2021-05-18

## 2021-05-18 NOTE — PROGRESS NOTES
Info from  sent to Dr. Perez. He had told me to wait and he would let me know when Mr Dahl was ready for CR

## 2021-06-11 ENCOUNTER — TRANSCRIBE ORDERS (OUTPATIENT)
Dept: PODIATRY | Facility: CLINIC | Age: 41
End: 2021-06-11

## 2021-06-11 DIAGNOSIS — L60.0 INGROWN TOENAIL: Primary | ICD-10-CM

## 2021-06-21 ENCOUNTER — LAB (OUTPATIENT)
Dept: LAB | Facility: HOSPITAL | Age: 41
End: 2021-06-21

## 2021-06-21 DIAGNOSIS — D50.8 OTHER IRON DEFICIENCY ANEMIA: ICD-10-CM

## 2021-06-21 DIAGNOSIS — K21.00 GASTROESOPHAGEAL REFLUX DISEASE WITH ESOPHAGITIS WITHOUT HEMORRHAGE: ICD-10-CM

## 2021-06-21 DIAGNOSIS — K74.00 HEPATIC FIBROSIS: ICD-10-CM

## 2021-06-21 DIAGNOSIS — R74.8 ELEVATED LIVER ENZYMES: ICD-10-CM

## 2021-06-21 LAB
INR PPP: 1.23 (ref 0.8–1.2)
PROTHROMBIN TIME: 15.3 SECONDS (ref 11.1–15.3)

## 2021-06-21 PROCEDURE — 83883 ASSAY NEPHELOMETRY NOT SPEC: CPT

## 2021-06-21 PROCEDURE — 84478 ASSAY OF TRIGLYCERIDES: CPT

## 2021-06-21 PROCEDURE — 80053 COMPREHEN METABOLIC PANEL: CPT

## 2021-06-21 PROCEDURE — 82465 ASSAY BLD/SERUM CHOLESTEROL: CPT

## 2021-06-21 PROCEDURE — 82172 ASSAY OF APOLIPOPROTEIN: CPT

## 2021-06-21 PROCEDURE — 83010 ASSAY OF HAPTOGLOBIN QUANT: CPT

## 2021-06-21 PROCEDURE — 36415 COLL VENOUS BLD VENIPUNCTURE: CPT

## 2021-06-21 PROCEDURE — 82977 ASSAY OF GGT: CPT

## 2021-06-21 PROCEDURE — 85610 PROTHROMBIN TIME: CPT

## 2021-06-22 LAB
ALBUMIN SERPL-MCNC: 4.2 G/DL (ref 3.5–5.2)
ALBUMIN/GLOB SERPL: 1.6 G/DL
ALP SERPL-CCNC: 88 U/L (ref 39–117)
ALT SERPL W P-5'-P-CCNC: 29 U/L (ref 1–41)
ANION GAP SERPL CALCULATED.3IONS-SCNC: 12 MMOL/L (ref 5–15)
AST SERPL-CCNC: 35 U/L (ref 1–40)
BILIRUB SERPL-MCNC: 2 MG/DL (ref 0–1.2)
BUN SERPL-MCNC: 19 MG/DL (ref 6–20)
BUN/CREAT SERPL: 17.6 (ref 7–25)
CALCIUM SPEC-SCNC: 9.8 MG/DL (ref 8.6–10.5)
CHLORIDE SERPL-SCNC: 98 MMOL/L (ref 98–107)
CO2 SERPL-SCNC: 27 MMOL/L (ref 22–29)
CREAT SERPL-MCNC: 1.08 MG/DL (ref 0.76–1.27)
GFR SERPL CREATININE-BSD FRML MDRD: 76 ML/MIN/1.73
GLOBULIN UR ELPH-MCNC: 2.6 GM/DL
GLUCOSE SERPL-MCNC: 101 MG/DL (ref 65–99)
POTASSIUM SERPL-SCNC: 4.8 MMOL/L (ref 3.5–5.2)
PROT SERPL-MCNC: 6.8 G/DL (ref 6–8.5)
SODIUM SERPL-SCNC: 137 MMOL/L (ref 136–145)

## 2021-06-23 LAB
A2 MACROGLOB SERPL-MCNC: 224 MG/DL (ref 110–276)
ALT SERPL W P-5'-P-CCNC: 32 IU/L (ref 0–55)
APO A-I SERPL-MCNC: 107 MG/DL (ref 101–178)
AST SERPL W P-5'-P-CCNC: 38 IU/L (ref 0–40)
BILIRUB SERPL-MCNC: 1.9 MG/DL (ref 0–1.2)
CHOLEST SERPL-MCNC: 127 MG/DL (ref 100–199)
FIBROSIS SCORING:: ABNORMAL
FIBROSIS STAGE SERPL QL: ABNORMAL
GGT SERPL-CCNC: 233 IU/L (ref 0–65)
GLUCOSE SERPL-MCNC: 101 MG/DL (ref 65–99)
HAPTOGLOB SERPL-MCNC: 128 MG/DL (ref 17–317)
INTERPRETATIONS: (REFERENCE): ABNORMAL
LABORATORY COMMENT REPORT: ABNORMAL
LIVER FIBR SCORE SERPL CALC.FIBROSURE: 0.74 (ref 0–0.21)
NASH SCORING (REFERENCE): ABNORMAL
NECROINFLAMMATORY ACT GRADE SERPL QL: ABNORMAL
NECROINFLAMMATORY ACT SCORE SERPL: 0.25
SERVICE CMNT-IMP: ABNORMAL
STEATOSIS GRADE (REFERENCE): ABNORMAL
STEATOSIS GRADING (REFERENCE): ABNORMAL
STEATOSIS SCORE (REFERENCE): 0.55 (ref 0–0.3)
TRIGL SERPL-MCNC: 78 MG/DL (ref 0–149)

## 2021-06-24 ENCOUNTER — OFFICE VISIT (OUTPATIENT)
Dept: GASTROENTEROLOGY | Facility: CLINIC | Age: 41
End: 2021-06-24

## 2021-06-24 VITALS
DIASTOLIC BLOOD PRESSURE: 81 MMHG | HEIGHT: 71 IN | WEIGHT: 179 LBS | HEART RATE: 109 BPM | BODY MASS INDEX: 25.06 KG/M2 | SYSTOLIC BLOOD PRESSURE: 111 MMHG

## 2021-06-24 DIAGNOSIS — I85.00 IDIOPATHIC ESOPHAGEAL VARICES WITHOUT BLEEDING (HCC): Primary | ICD-10-CM

## 2021-06-24 PROCEDURE — 99214 OFFICE O/P EST MOD 30 MIN: CPT | Performed by: INTERNAL MEDICINE

## 2021-06-24 RX ORDER — FUROSEMIDE 80 MG
1 TABLET ORAL 2 TIMES DAILY
COMMUNITY
Start: 2021-05-14 | End: 2021-08-02 | Stop reason: HOSPADM

## 2021-06-24 RX ORDER — DEXTROSE AND SODIUM CHLORIDE 5; .45 G/100ML; G/100ML
30 INJECTION, SOLUTION INTRAVENOUS CONTINUOUS PRN
Status: CANCELLED | OUTPATIENT
Start: 2021-06-24

## 2021-06-24 RX ORDER — LIDOCAINE AND PRILOCAINE 25; 25 MG/G; MG/G
CREAM TOPICAL AS NEEDED
COMMUNITY

## 2021-06-24 NOTE — PROGRESS NOTES
[facility]      Chief Complaint:   Chief Complaint   Patient presents with   • 5 Month Clinical Appointment     Hepatosis FIbrosis.  Elevated Liver Enzymes.  Gastroesophageal Reflux Disease With Esophagitis.  Iron Deficiency Anemia.       Subjective     HPI:   Patient usually follows up with Alejandro Hughes but missed his appointment secondary to an appointment he had in [place].  Patient has fatty liver and has not had a repeat Gonzalez fiber sure which has worsened to levels consistent with cirrhosis.  Discussed with patient importance of continued weight loss.  We will schedule patient for EGD to evaluate for varices.  Will have patient follow-up with Alejandro Hughes for his continued care of his liver disease.    Plan; we will schedule patient for EGD to evaluate for varices.  Will have patient follow-up with Alejandro Hughes l will have patient try to lose weight    Past Medical History:   Past Medical History:   Diagnosis Date   • Anxiety    • Elevated liver enzymes    • GERD (gastroesophageal reflux disease)    • Heart trouble     zoll vest defibrillator   • PE (pulmonary thromboembolism) (CMS/HCC)    • Pneumonia        Past Surgical History:  Past Surgical History:   Procedure Laterality Date   • CARDIAC ELECTROPHYSIOLOGY PROCEDURE N/A 7/28/2020    Procedure: ICD SC new;  Surgeon: Ivis Gorman MD;  Location: [facility] INVASIVE LOCATION;  Service: Cardiology;  Laterality: N/A;   • VASCULAR SURGERY         Family History:  Family History   Problem Relation Age of Onset   • Diabetes Other    • Heart disease Other    • Hypertension Other    • Anxiety disorder Other    • Allergies Other        Social History:   reports that he quit smoking about 15 months ago. His smoking use included cigarettes. He has a 5.00 pack-year smoking history. He has never used smokeless tobacco. He reports current alcohol use. He reports that he does not use drugs.    Medications:   Prior to Admission medications     Medication Sig Start Date End Date Taking? Authorizing Provider   albuterol sulfate  (90 Base) MCG/ACT inhaler Inhale 2 puffs Every 4 (Four) Hours As Needed for Wheezing. 12/4/19  Yes Naun Herbert MD   apixaban (ELIQUIS) 5 MG tablet tablet Take 5 mg by mouth.   Yes Lena Cabrera MD   Calcium Carbonate Antacid 1250 MG/5ML Take  by mouth.   Yes Lena Cabrera MD   digoxin (LANOXIN) 250 MCG tablet Take 1 tablet by mouth Daily. 2/9/20  Yes Ricardo Grubbs MD   furosemide (LASIX) 80 MG tablet Take 1 tablet by mouth 2 (Two) Times a Day. 5/14/21  Yes Lena Cabrera MD   lidocaine-prilocaine (EMLA) 2.5-2.5 % cream As Needed.   Yes Lena Cabrera MD   lisdexamfetamine (VYVANSE) 70 MG capsule Take 70 mg by mouth Every Morning Pt takes 1/2 in the morning and 1/2 in the afternoon    Yes Lena Cabrera MD   Magnesium Oxide 400 (240 Mg) MG tablet Take 1 tablet by mouth 2 (Two) Times a Day. 2/8/20  Yes Ricardo Grubbs MD   metoprolol tartrate (LOPRESSOR) 25 MG tablet Take 0.5 tablets by mouth Every 12 (Twelve) Hours. 2/8/20  Yes Ricardo Grubbs MD   pantoprazole (Protonix) 40 MG EC tablet Take 1 tablet by mouth Daily. 10/12/20  Yes Alejandro Hughes PA-C   sacubitril-valsartan (ENTRESTO) 24-26 MG tablet Take 1 tablet by mouth Every 12 (Twelve) Hours. 2/8/20  Yes Ricardo Grubbs MD   spironolactone (ALDACTONE) 25 MG tablet Take 0.5 tablets by mouth Daily. 2/9/20  Yes Ricardo Grubbs MD   vitamin D (ERGOCALCIFEROL) 1.25 MG (32228 UT) capsule capsule Take 50,000 Units by mouth Every 7 (Seven) Days.   Yes Lena Cabrera MD   zolpidem (AMBIEN) 10 MG tablet Take 10 mg by mouth At Night As Needed for Sleep.   Yes Lena Cabrera MD   warfarin (COUMADIN) 5 MG tablet Take 10 mg by mouth Every Night. Take 2 tablets (10 mg) by mouth nightly on MoFr    Provider, MD Lena   warfarin (COUMADIN) 5 MG tablet Take 7.5 mg by mouth Every Night. Take one and  "one-half tablets (7.5 mg) by mouth nightly on TuWeThSaSu    ProviderLena MD   furosemide (LASIX) 40 MG tablet Take 1 tablet by mouth 2 (Two) Times a Day. 2/8/20 6/24/21  Ricardo Grubbs MD       Allergies:  Chantix [varenicline]    ROS:    Review of Systems   Constitutional: Negative for activity change, appetite change and unexpected weight change.   HENT: Negative for congestion, sore throat and trouble swallowing.    Respiratory: Negative for cough, choking and shortness of breath.    Cardiovascular: Negative for chest pain.   Gastrointestinal: Negative for abdominal distention, abdominal pain, anal bleeding, blood in stool, constipation, diarrhea, nausea, rectal pain and vomiting.   Endocrine: Negative for heat intolerance, polydipsia and polyphagia.   Genitourinary: Negative for difficulty urinating.   Musculoskeletal: Negative for arthralgias.   Skin: Negative for color change, pallor, rash and wound.   Allergic/Immunologic: Negative for food allergies.   Neurological: Negative for dizziness, syncope, weakness and headaches.   Psychiatric/Behavioral: Negative for agitation, behavioral problems, confusion and decreased concentration.     Objective     Blood pressure 111/81, pulse 109, height 180.3 cm (71\"), weight 81.2 kg (179 lb).    Physical Exam  Constitutional:       General: He is not in acute distress.     Appearance: He is well-developed. He is not diaphoretic.   HENT:      Head: Normocephalic and atraumatic.   Cardiovascular:      Rate and Rhythm: Normal rate and regular rhythm.      Heart sounds: Normal heart sounds. No murmur heard.   No friction rub. No gallop.    Pulmonary:      Effort: No respiratory distress.      Breath sounds: Normal breath sounds. No wheezing or rales.   Chest:      Chest wall: No tenderness.   Abdominal:      General: Bowel sounds are normal. There is no distension.      Palpations: Abdomen is soft. There is no mass.      Tenderness: There is no abdominal " tenderness. There is no guarding or rebound.      Hernia: No hernia is present.   Musculoskeletal:         General: Normal range of motion.   Skin:     General: Skin is warm and dry.      Coloration: Skin is not pale.      Findings: No erythema or rash.   Neurological:      Mental Status: He is alert and oriented to person, place, and time.   Psychiatric:         Behavior: Behavior normal.         Thought Content: Thought content normal.         Judgment: Judgment normal.          Assessment/Plan   Diagnoses and all orders for this visit:    1. Idiopathic esophageal varices without bleeding (CMS/HCC) (Primary)  -     Case Request; Standing  -     dextrose 5 % and sodium chloride 0.45 % infusion  -     Case Request    Other orders  -     Follow Anesthesia Guidelines / Standing Orders; Future  -     Obtain Informed Consent; Future  -     Implement Anesthesia Orders Day of Procedure; Standing  -     Obtain Informed Consent; Standing  -     POC Glucose Once; Standing  -     Insert Peripheral IV; Standing        ESOPHAGOGASTRODUODENOSCOPY (N/A)     Diagnosis Plan   1. Idiopathic esophageal varices without bleeding (CMS/HCC)  Case Request    dextrose 5 % and sodium chloride 0.45 % infusion    Case Request       Anticipated Surgical Procedure:  Orders Placed This Encounter   Procedures   • Follow Anesthesia Guidelines / Standing Orders     Standing Status:   Future   • Obtain Informed Consent     Standing Status:   Future     Order Specific Question:   Informed Consent Given For     Answer:   ESOPHAGOGASTRODUODENOSCOPY       The risks, benefits, and alternatives of this procedure have been discussed with the patient or the responsible party- the patient understands and agrees to proceed.

## 2021-06-24 NOTE — PATIENT INSTRUCTIONS
MyPlate from USDA    MyPlate is an outline of a general healthy diet based on the 2010 Dietary Guidelines for Americans, from the U[place] Department of Agriculture (USDA). It sets guidelines for how much food you should eat from each food group based on your age, sex, and level of physical activity.  What are tips for following MyPlate?  To follow MyPlate recommendations:  · Eat a wide variety of fruits and vegetables, grains, and protein foods.  · Serve smaller portions and eat less food throughout the day.  · Limit portion sizes to avoid overeating.  · Enjoy your food.  · Get at least 150 minutes of exercise every week. This is about 30 minutes each day, 5 or more days per week.  It can be difficult to have every meal look like MyPlate. Think about MyPlate as eating guidelines for an entire day, rather than each individual meal.  Fruits and vegetables  · Make half of your plate fruits and vegetables.  · Eat many different colors of fruits and vegetables each day.  · For a 2,000 calorie daily food plan, eat:  ? 2½ cups of vegetables every day.  ? 2 cups of fruit every day.  · 1 cup is equal to:  ? 1 cup raw or cooked vegetables.  ? 1 cup raw fruit.  ? 1 medium-sized orange, apple, or banana.  ? 1 cup 100% fruit or vegetable juice.  ? 2 cups raw leafy greens, such as lettuce, spinach, or kale.  ? ½ cup dried fruit.  Grains  · One fourth of your plate should be grains.  · Make at least half of the grains you eat each day whole grains.  · For a 2,000 calorie daily food plan, eat 6 oz of grains every day.  · 1 oz is equal to:  ? 1 slice bread.  ? 1 cup cereal.  ? ½ cup cooked rice, cereal, or pasta.  Protein  · One fourth of your plate should be protein.  · Eat a wide variety of protein foods, including meat, poultry, fish, eggs, beans, nuts, and tofu.  · For a 2,000 calorie daily food plan, eat 5½ oz of protein every day.  · 1 oz is equal to:  ? 1 oz meat, poultry, or fish.  ? ¼ cup cooked beans.  ? 1 egg.  ? ½ oz nuts  or seeds.  ? 1 Tbsp peanut butter.  Dairy  · Drink fat-free or low-fat (1%) milk.  · Eat or drink dairy as a side to meals.  · For a 2,000 calorie daily food plan, eat or drink 3 cups of dairy every day.  · 1 cup is equal to:  ? 1 cup milk, yogurt, cottage cheese, or soy milk (soy beverage).  ? 2 oz processed cheese.  ? 1½ oz natural cheese.  Fats, oils, salt, and sugars  · Only small amounts of oils are recommended.  · Avoid foods that are high in calories and low in nutritional value (empty calories), like foods high in fat or added sugars.  · Choose foods that are low in salt (sodium). Choose foods that have less than 140 milligrams (mg) of sodium per serving.  · Drink water instead of sugary drinks. Drink enough water each day to keep your urine pale yellow.  Where to find support  · Work with your health care provider or a nutrition specialist (dietitian) to develop a customized eating plan that is right for you.  · Download an nanci (mobile application) to help you track your daily food intake.  Where to find more information  · Go to ChooseMyPlate.gov for more information.  Summary  · MyPlate is a general guideline for healthy eating from the USDA. It is based on the 2010 Dietary Guidelines for Americans.  · In general, fruits and vegetables should take up ½ of your plate, grains should take up ¼ of your plate, and protein should take up ¼ of your plate.  This information is not intended to replace advice given to you by your health care provider. Make sure you discuss any questions you have with your health care provider.  Document Revised: 05/21/2020 Document Reviewed: 03/19/2018  Elsevier Patient Education © 2021 [employer]

## 2021-06-25 ENCOUNTER — HOSPITAL ENCOUNTER (OUTPATIENT)
Facility: HOSPITAL | Age: 41
Setting detail: HOSPITAL OUTPATIENT SURGERY
End: 2021-06-25
Attending: INTERNAL MEDICINE | Admitting: INTERNAL MEDICINE

## 2021-06-25 PROBLEM — I85.00 IDIOPATHIC ESOPHAGEAL VARICES WITHOUT BLEEDING (HCC): Status: ACTIVE | Noted: 2021-06-25

## 2021-06-30 ENCOUNTER — OFFICE VISIT (OUTPATIENT)
Dept: PODIATRY | Facility: CLINIC | Age: 41
End: 2021-06-30

## 2021-06-30 VITALS
HEIGHT: 71 IN | BODY MASS INDEX: 25.06 KG/M2 | OXYGEN SATURATION: 100 % | DIASTOLIC BLOOD PRESSURE: 78 MMHG | HEART RATE: 94 BPM | SYSTOLIC BLOOD PRESSURE: 114 MMHG | WEIGHT: 179 LBS

## 2021-06-30 DIAGNOSIS — L60.0 INGROWN TOENAIL: ICD-10-CM

## 2021-06-30 DIAGNOSIS — G89.29 CHRONIC PAIN OF TOE OF RIGHT FOOT: Primary | ICD-10-CM

## 2021-06-30 DIAGNOSIS — M79.674 CHRONIC PAIN OF TOE OF RIGHT FOOT: Primary | ICD-10-CM

## 2021-06-30 PROCEDURE — 11750 EXCISION NAIL&NAIL MATRIX: CPT | Performed by: PODIATRIST

## 2021-06-30 PROCEDURE — 99203 OFFICE O/P NEW LOW 30 MIN: CPT | Performed by: PODIATRIST

## 2021-07-14 ENCOUNTER — OFFICE VISIT (OUTPATIENT)
Dept: PODIATRY | Facility: CLINIC | Age: 41
End: 2021-07-14

## 2021-07-14 VITALS
WEIGHT: 179 LBS | SYSTOLIC BLOOD PRESSURE: 111 MMHG | HEART RATE: 98 BPM | DIASTOLIC BLOOD PRESSURE: 79 MMHG | BODY MASS INDEX: 25.06 KG/M2 | HEIGHT: 71 IN | OXYGEN SATURATION: 99 %

## 2021-07-14 DIAGNOSIS — L60.0 INGROWN TOENAIL: Primary | ICD-10-CM

## 2021-07-14 PROCEDURE — 99212 OFFICE O/P EST SF 10 MIN: CPT | Performed by: PODIATRIST

## 2021-07-14 RX ORDER — WARFARIN SODIUM 10 MG/1
TABLET ORAL
COMMUNITY
Start: 2021-05-21 | End: 2021-07-14

## 2021-07-14 RX ORDER — AMIODARONE HYDROCHLORIDE 200 MG/1
200 TABLET ORAL 2 TIMES DAILY
COMMUNITY
Start: 2021-06-09 | End: 2021-07-14

## 2021-07-14 RX ORDER — METOPROLOL SUCCINATE 25 MG/1
TABLET, EXTENDED RELEASE ORAL
COMMUNITY
Start: 2021-07-13 | End: 2021-07-24 | Stop reason: HOSPADM

## 2021-07-14 RX ORDER — BUMETANIDE 1 MG/1
TABLET ORAL
Status: ON HOLD | COMMUNITY
Start: 2021-05-21 | End: 2021-07-31

## 2021-07-14 NOTE — PROGRESS NOTES
Didier Dahl  1980  41 y.o. male    2021     Chief Complaint   Patient presents with   • Right Foot - Follow-up       History of Present Illness    Didier Dahl is a 41 y.o.male who presents to clinic today for follow-up of his right great toenail procedure.    Past Medical History:   Diagnosis Date   • Anxiety    • Callus    • Clotting disorder (CMS/HCC)    • Elevated liver enzymes    • GERD (gastroesophageal reflux disease)    • Heart trouble     zoll vest defibrillator   • Hypertension    • PE (pulmonary thromboembolism) (CMS/HCC)    • Pneumonia          Past Surgical History:   Procedure Laterality Date   • CARDIAC ELECTROPHYSIOLOGY PROCEDURE N/A 2020    Procedure: ICD SC new;  Surgeon: Ivis Gorman MD;  Location: [facility] INVASIVE LOCATION;  Service: Cardiology;  Laterality: N/A;   • VASCULAR SURGERY           Family History   Problem Relation Age of Onset   • Diabetes Other    • Heart disease Other    • Hypertension Other    • Anxiety disorder Other    • Allergies Other        Allergies   Allergen Reactions   • Chantix [Varenicline] Anaphylaxis       Social History     Socioeconomic History   • Marital status: Single     Spouse name: Not on file   • Number of children: Not on file   • Years of education: Not on file   • Highest education level: Not on file   Tobacco Use   • Smoking status: Former Smoker     Packs/day: 0.50     Years: 10.00     Pack years: 5.00     Types: Cigarettes     Quit date: 3/1/2020     Years since quittin.3   • Smokeless tobacco: Never Used   Vaping Use   • Vaping Use: Former   • Substances: Flavoring   • Devices: Refillable tank   Substance and Sexual Activity   • Alcohol use: Yes     Comment: 2021 - Patient states he consumes alcoholic beverages occasionally.   • Drug use: No   • Sexual activity: Defer         Current Outpatient Medications   Medication Sig Dispense Refill   • albuterol sulfate  (90 Base) MCG/ACT inhaler Inhale  "2 puffs Every 4 (Four) Hours As Needed for Wheezing. 1 inhaler 0   • apixaban (ELIQUIS) 5 MG tablet tablet Take 5 mg by mouth.     • bumetanide (BUMEX) 1 MG tablet      • Calcium Carbonate Antacid 1250 MG/5ML Take  by mouth.     • digoxin (LANOXIN) 250 MCG tablet Take 1 tablet by mouth Daily. 30 tablet 1   • furosemide (LASIX) 80 MG tablet Take 1 tablet by mouth 2 (Two) Times a Day.     • lidocaine-prilocaine (EMLA) 2.5-2.5 % cream As Needed.     • lisdexamfetamine (VYVANSE) 70 MG capsule Take 70 mg by mouth Every Morning Pt takes 1/2 in the morning and 1/2 in the afternoon      • Magnesium Oxide 400 (240 Mg) MG tablet Take 1 tablet by mouth 2 (Two) Times a Day. 60 tablet 1   • MAGnesium-Oxide 400 (241.3 Mg) MG tablet tablet Take 400 mg by mouth 2 (Two) Times a Day.     • metoprolol succinate XL (TOPROL-XL) 25 MG 24 hr tablet      • metoprolol tartrate (LOPRESSOR) 25 MG tablet Take 0.5 tablets by mouth Every 12 (Twelve) Hours. 15 tablet 1   • pantoprazole (Protonix) 40 MG EC tablet Take 1 tablet by mouth Daily. 30 tablet 5   • sacubitril-valsartan (ENTRESTO) 24-26 MG tablet Take 1 tablet by mouth Every 12 (Twelve) Hours. 60 tablet 1   • spironolactone (ALDACTONE) 25 MG tablet Take 0.5 tablets by mouth Daily. 30 tablet 1   • vitamin D (ERGOCALCIFEROL) 1.25 MG (02899 UT) capsule capsule Take 50,000 Units by mouth Every 7 (Seven) Days.     • zolpidem (AMBIEN) 10 MG tablet Take 10 mg by mouth At Night As Needed for Sleep.       No current facility-administered medications for this visit.       Review of Systems   Constitutional: Negative.    HENT: Negative.    Respiratory: Negative.    Cardiovascular: Negative.    Gastrointestinal: Negative.    Musculoskeletal: Negative.    Neurological: Negative.    Psychiatric/Behavioral: Negative.          OBJECTIVE    /79   Pulse 98   Ht 180.3 cm (71\")   Wt 81.2 kg (179 lb)   SpO2 99%   BMI 24.97 kg/m²     Physical Exam  Vitals reviewed.   Constitutional:       General: " He is not in acute distress.     Appearance: He is well-developed.   HENT:      Head: Normocephalic and atraumatic.      Nose: Nose normal.   Eyes:      Conjunctiva/sclera: Conjunctivae normal.      Pupils: Pupils are equal, round, and reactive to light.   Pulmonary:      Effort: Pulmonary effort is normal. No respiratory distress.      Breath sounds: No wheezing.   Musculoskeletal:         General: No deformity. Normal range of motion.   Skin:     General: Skin is warm and dry.      Capillary Refill: Capillary refill takes less than 2 seconds.   Neurological:      Mental Status: He is alert and oriented to person, place, and time.   Psychiatric:         Behavior: Behavior normal.         Thought Content: Thought content normal.          Right Lower Extremity Exam:     Cardiovascular:    DP/PT pulses palpable    CFT brisk  to all digits    Skin temp is warm to warm from proximal tibia to distal digits    Pedal hair growth present    No erythema or edema noted    Musculoskeletal:  Muscle strength is 5/5 for all muscle groups tested    ROM of the 1st MTP is WNL    ROM of the ankle joint is  WNL    Dermatological:   Webspaces 1-4  are clean, dry and intact.   No subcutaneous nodules or masses noted     No open wounds noted    Medial border right hallux nail is absent.  No signs of infection.  Neurological:   Sensation intact to light touch        Foot/Ankle Exam        Procedures        ASSESSMENT AND PLAN    Diagnoses and all orders for this visit:    1. Ingrown toenail (Primary)        - Patient doing well following nail procedure.  Advised on site care going forward.  All of her questions were answered.  Recheck as needed.           This document has been electronically signed by Brain Taylor DPM on July 14, 2021 14:47 CDT     7/14/2021  14:47 CDT

## 2021-07-20 ENCOUNTER — APPOINTMENT (OUTPATIENT)
Dept: GENERAL RADIOLOGY | Facility: HOSPITAL | Age: 41
End: 2021-07-20

## 2021-07-20 ENCOUNTER — HOSPITAL ENCOUNTER (INPATIENT)
Facility: HOSPITAL | Age: 41
LOS: 4 days | Discharge: HOME | End: 2021-07-24
Attending: EMERGENCY MEDICINE | Admitting: FAMILY MEDICINE

## 2021-07-20 DIAGNOSIS — I50.20 SYSTOLIC CONGESTIVE HEART FAILURE, UNSPECIFIED HF CHRONICITY (HCC): Primary | ICD-10-CM

## 2021-07-20 DIAGNOSIS — R07.9 CHEST PAIN, UNSPECIFIED TYPE: ICD-10-CM

## 2021-07-20 LAB
ALBUMIN SERPL-MCNC: 4.1 G/DL (ref 3.5–5.2)
ALBUMIN/GLOB SERPL: 1.5 G/DL
ALP SERPL-CCNC: 110 U/L (ref 39–117)
ALT SERPL W P-5'-P-CCNC: 21 U/L (ref 1–41)
AMPHET+METHAMPHET UR QL: POSITIVE
AMPHETAMINES UR QL: NEGATIVE
ANION GAP SERPL CALCULATED.3IONS-SCNC: 12 MMOL/L (ref 5–15)
APTT PPP: 31.5 SECONDS (ref 20–40.3)
AST SERPL-CCNC: 30 U/L (ref 1–40)
BARBITURATES UR QL SCN: NEGATIVE
BASOPHILS # BLD AUTO: 0.05 10*3/MM3 (ref 0–0.2)
BASOPHILS NFR BLD AUTO: 1 % (ref 0–1.5)
BENZODIAZ UR QL SCN: NEGATIVE
BILIRUB SERPL-MCNC: 2 MG/DL (ref 0–1.2)
BILIRUB UR QL STRIP: ABNORMAL
BUN SERPL-MCNC: 24 MG/DL (ref 6–20)
BUN/CREAT SERPL: 22 (ref 7–25)
BUPRENORPHINE SERPL-MCNC: NEGATIVE NG/ML
CALCIUM SPEC-SCNC: 9.4 MG/DL (ref 8.6–10.5)
CANNABINOIDS SERPL QL: NEGATIVE
CHLORIDE SERPL-SCNC: 98 MMOL/L (ref 98–107)
CLARITY UR: CLEAR
CO2 SERPL-SCNC: 25 MMOL/L (ref 22–29)
COCAINE UR QL: NEGATIVE
COLOR UR: ABNORMAL
CREAT SERPL-MCNC: 1.09 MG/DL (ref 0.76–1.27)
DEPRECATED RDW RBC AUTO: 58.7 FL (ref 37–54)
DIGOXIN SERPL-MCNC: 0.9 NG/ML (ref 0.6–1.2)
EOSINOPHIL # BLD AUTO: 0.05 10*3/MM3 (ref 0–0.4)
EOSINOPHIL NFR BLD AUTO: 1 % (ref 0.3–6.2)
ERYTHROCYTE [DISTWIDTH] IN BLOOD BY AUTOMATED COUNT: 18.4 % (ref 12.3–15.4)
FLUAV RNA RESP QL NAA+PROBE: NOT DETECTED
FLUBV RNA RESP QL NAA+PROBE: NOT DETECTED
GFR SERPL CREATININE-BSD FRML MDRD: 75 ML/MIN/1.73
GLOBULIN UR ELPH-MCNC: 2.8 GM/DL
GLUCOSE SERPL-MCNC: 102 MG/DL (ref 65–99)
GLUCOSE UR STRIP-MCNC: NEGATIVE MG/DL
HCT VFR BLD AUTO: 39 % (ref 37.5–51)
HGB BLD-MCNC: 12.2 G/DL (ref 13–17.7)
HGB UR QL STRIP.AUTO: NEGATIVE
HOLD SPECIMEN: NORMAL
HOLD SPECIMEN: NORMAL
IMM GRANULOCYTES # BLD AUTO: 0.01 10*3/MM3 (ref 0–0.05)
IMM GRANULOCYTES NFR BLD AUTO: 0.2 % (ref 0–0.5)
INR PPP: 1.39 (ref 0.8–1.2)
KETONES UR QL STRIP: NEGATIVE
LEUKOCYTE ESTERASE UR QL STRIP.AUTO: NEGATIVE
LYMPHOCYTES # BLD AUTO: 1.31 10*3/MM3 (ref 0.7–3.1)
LYMPHOCYTES NFR BLD AUTO: 27.1 % (ref 19.6–45.3)
MCH RBC QN AUTO: 27.5 PG (ref 26.6–33)
MCHC RBC AUTO-ENTMCNC: 31.3 G/DL (ref 31.5–35.7)
MCV RBC AUTO: 87.8 FL (ref 79–97)
METHADONE UR QL SCN: NEGATIVE
MONOCYTES # BLD AUTO: 0.56 10*3/MM3 (ref 0.1–0.9)
MONOCYTES NFR BLD AUTO: 11.6 % (ref 5–12)
NEUTROPHILS NFR BLD AUTO: 2.85 10*3/MM3 (ref 1.7–7)
NEUTROPHILS NFR BLD AUTO: 59.1 % (ref 42.7–76)
NITRITE UR QL STRIP: NEGATIVE
NRBC BLD AUTO-RTO: 0 /100 WBC (ref 0–0.2)
NT-PROBNP SERPL-MCNC: 8369 PG/ML (ref 0–450)
OPIATES UR QL: NEGATIVE
OXYCODONE UR QL SCN: NEGATIVE
PCP UR QL SCN: NEGATIVE
PH UR STRIP.AUTO: 6 [PH] (ref 5–9)
PLATELET # BLD AUTO: 206 10*3/MM3 (ref 140–450)
PMV BLD AUTO: 10.4 FL (ref 6–12)
POTASSIUM SERPL-SCNC: 4.2 MMOL/L (ref 3.5–5.2)
PROPOXYPH UR QL: NEGATIVE
PROT SERPL-MCNC: 6.9 G/DL (ref 6–8.5)
PROT UR QL STRIP: NEGATIVE
PROTHROMBIN TIME: 16.8 SECONDS (ref 11.1–15.3)
QT INTERVAL: 352 MS
QTC INTERVAL: 413 MS
RBC # BLD AUTO: 4.44 10*6/MM3 (ref 4.14–5.8)
SARS-COV-2 RNA RESP QL NAA+PROBE: NOT DETECTED
SODIUM SERPL-SCNC: 135 MMOL/L (ref 136–145)
SP GR UR STRIP: 1.02 (ref 1–1.03)
TRICYCLICS UR QL SCN: NEGATIVE
TROPONIN T SERPL-MCNC: 0.01 NG/ML (ref 0–0.03)
TROPONIN T SERPL-MCNC: 0.01 NG/ML (ref 0–0.03)
UROBILINOGEN UR QL STRIP: ABNORMAL
WBC # BLD AUTO: 4.83 10*3/MM3 (ref 3.4–10.8)
WHOLE BLOOD HOLD SPECIMEN: NORMAL

## 2021-07-20 PROCEDURE — 84484 ASSAY OF TROPONIN QUANT: CPT | Performed by: NURSE PRACTITIONER

## 2021-07-20 PROCEDURE — 80053 COMPREHEN METABOLIC PANEL: CPT | Performed by: EMERGENCY MEDICINE

## 2021-07-20 PROCEDURE — 85610 PROTHROMBIN TIME: CPT | Performed by: NURSE PRACTITIONER

## 2021-07-20 PROCEDURE — 71046 X-RAY EXAM CHEST 2 VIEWS: CPT

## 2021-07-20 PROCEDURE — 80306 DRUG TEST PRSMV INSTRMNT: CPT | Performed by: NURSE PRACTITIONER

## 2021-07-20 PROCEDURE — 99285 EMERGENCY DEPT VISIT HI MDM: CPT

## 2021-07-20 PROCEDURE — 25010000002 FUROSEMIDE PER 20 MG: Performed by: NURSE PRACTITIONER

## 2021-07-20 PROCEDURE — 93010 ELECTROCARDIOGRAM REPORT: CPT | Performed by: INTERNAL MEDICINE

## 2021-07-20 PROCEDURE — 93005 ELECTROCARDIOGRAM TRACING: CPT | Performed by: EMERGENCY MEDICINE

## 2021-07-20 PROCEDURE — G0378 HOSPITAL OBSERVATION PER HR: HCPCS

## 2021-07-20 PROCEDURE — 87636 SARSCOV2 & INF A&B AMP PRB: CPT | Performed by: NURSE PRACTITIONER

## 2021-07-20 PROCEDURE — 83880 ASSAY OF NATRIURETIC PEPTIDE: CPT | Performed by: NURSE PRACTITIONER

## 2021-07-20 PROCEDURE — 93005 ELECTROCARDIOGRAM TRACING: CPT

## 2021-07-20 PROCEDURE — 85730 THROMBOPLASTIN TIME PARTIAL: CPT | Performed by: NURSE PRACTITIONER

## 2021-07-20 PROCEDURE — 25010000002 DOBUTAMINE PER 250 MG: Performed by: FAMILY MEDICINE

## 2021-07-20 PROCEDURE — 85025 COMPLETE CBC W/AUTO DIFF WBC: CPT | Performed by: NURSE PRACTITIONER

## 2021-07-20 PROCEDURE — 80162 ASSAY OF DIGOXIN TOTAL: CPT | Performed by: NURSE PRACTITIONER

## 2021-07-20 PROCEDURE — 81003 URINALYSIS AUTO W/O SCOPE: CPT | Performed by: NURSE PRACTITIONER

## 2021-07-20 RX ORDER — PANTOPRAZOLE SODIUM 40 MG/1
40 TABLET, DELAYED RELEASE ORAL
Status: DISCONTINUED | OUTPATIENT
Start: 2021-07-21 | End: 2021-07-24 | Stop reason: HOSPADM

## 2021-07-20 RX ORDER — ONDANSETRON 2 MG/ML
4 INJECTION INTRAMUSCULAR; INTRAVENOUS EVERY 6 HOURS PRN
Status: DISCONTINUED | OUTPATIENT
Start: 2021-07-20 | End: 2021-07-24 | Stop reason: HOSPADM

## 2021-07-20 RX ORDER — FUROSEMIDE 80 MG
80 TABLET ORAL
Status: DISCONTINUED | OUTPATIENT
Start: 2021-07-21 | End: 2021-07-24 | Stop reason: HOSPADM

## 2021-07-20 RX ORDER — SODIUM CHLORIDE 0.9 % (FLUSH) 0.9 %
10 SYRINGE (ML) INJECTION AS NEEDED
Status: DISCONTINUED | OUTPATIENT
Start: 2021-07-20 | End: 2021-07-24 | Stop reason: HOSPADM

## 2021-07-20 RX ORDER — ACETAMINOPHEN 650 MG/1
650 SUPPOSITORY RECTAL EVERY 4 HOURS PRN
Status: DISCONTINUED | OUTPATIENT
Start: 2021-07-20 | End: 2021-07-24 | Stop reason: HOSPADM

## 2021-07-20 RX ORDER — ALBUTEROL SULFATE 90 UG/1
2 AEROSOL, METERED RESPIRATORY (INHALATION) EVERY 4 HOURS PRN
Status: DISCONTINUED | OUTPATIENT
Start: 2021-07-20 | End: 2021-07-20 | Stop reason: CLARIF

## 2021-07-20 RX ORDER — DIGOXIN 250 MCG
250 TABLET ORAL
Status: DISCONTINUED | OUTPATIENT
Start: 2021-07-21 | End: 2021-07-24 | Stop reason: HOSPADM

## 2021-07-20 RX ORDER — DOBUTAMINE HYDROCHLORIDE 100 MG/100ML
8 INJECTION INTRAVENOUS CONTINUOUS
Status: DISCONTINUED | OUTPATIENT
Start: 2021-07-20 | End: 2021-07-20

## 2021-07-20 RX ORDER — ACETAMINOPHEN 325 MG/1
650 TABLET ORAL EVERY 4 HOURS PRN
Status: DISCONTINUED | OUTPATIENT
Start: 2021-07-20 | End: 2021-07-24 | Stop reason: HOSPADM

## 2021-07-20 RX ORDER — ALBUTEROL SULFATE 2.5 MG/3ML
2.5 SOLUTION RESPIRATORY (INHALATION) EVERY 4 HOURS PRN
Status: DISCONTINUED | OUTPATIENT
Start: 2021-07-20 | End: 2021-07-24 | Stop reason: HOSPADM

## 2021-07-20 RX ORDER — ONDANSETRON 4 MG/1
4 TABLET, FILM COATED ORAL EVERY 6 HOURS PRN
Status: DISCONTINUED | OUTPATIENT
Start: 2021-07-20 | End: 2021-07-24 | Stop reason: HOSPADM

## 2021-07-20 RX ORDER — POTASSIUM CHLORIDE 750 MG/1
40 CAPSULE, EXTENDED RELEASE ORAL AS NEEDED
Status: DISCONTINUED | OUTPATIENT
Start: 2021-07-20 | End: 2021-07-24 | Stop reason: HOSPADM

## 2021-07-20 RX ORDER — DOBUTAMINE HYDROCHLORIDE 100 MG/100ML
8 INJECTION INTRAVENOUS
Status: DISCONTINUED | OUTPATIENT
Start: 2021-07-20 | End: 2021-07-20

## 2021-07-20 RX ORDER — CALCIUM CARBONATE 200(500)MG
2 TABLET,CHEWABLE ORAL 2 TIMES DAILY PRN
Status: DISCONTINUED | OUTPATIENT
Start: 2021-07-20 | End: 2021-07-24 | Stop reason: HOSPADM

## 2021-07-20 RX ORDER — POTASSIUM CHLORIDE 1.5 G/1.77G
40 POWDER, FOR SOLUTION ORAL AS NEEDED
Status: DISCONTINUED | OUTPATIENT
Start: 2021-07-20 | End: 2021-07-24 | Stop reason: HOSPADM

## 2021-07-20 RX ORDER — DOBUTAMINE HYDROCHLORIDE 100 MG/100ML
8 INJECTION INTRAVENOUS CONTINUOUS
Status: DISCONTINUED | OUTPATIENT
Start: 2021-07-20 | End: 2021-07-23

## 2021-07-20 RX ORDER — ACETAMINOPHEN 160 MG/5ML
650 SOLUTION ORAL EVERY 4 HOURS PRN
Status: DISCONTINUED | OUTPATIENT
Start: 2021-07-20 | End: 2021-07-20 | Stop reason: SDUPTHER

## 2021-07-20 RX ORDER — ZOLPIDEM TARTRATE 5 MG/1
10 TABLET ORAL NIGHTLY PRN
Status: DISCONTINUED | OUTPATIENT
Start: 2021-07-20 | End: 2021-07-24 | Stop reason: HOSPADM

## 2021-07-20 RX ORDER — MAGNESIUM SULFATE HEPTAHYDRATE 40 MG/ML
2 INJECTION, SOLUTION INTRAVENOUS AS NEEDED
Status: DISCONTINUED | OUTPATIENT
Start: 2021-07-20 | End: 2021-07-24 | Stop reason: HOSPADM

## 2021-07-20 RX ORDER — SODIUM CHLORIDE 0.9 % (FLUSH) 0.9 %
10 SYRINGE (ML) INJECTION EVERY 12 HOURS SCHEDULED
Status: DISCONTINUED | OUTPATIENT
Start: 2021-07-20 | End: 2021-07-24 | Stop reason: HOSPADM

## 2021-07-20 RX ORDER — FUROSEMIDE 10 MG/ML
40 INJECTION INTRAMUSCULAR; INTRAVENOUS ONCE
Status: COMPLETED | OUTPATIENT
Start: 2021-07-20 | End: 2021-07-20

## 2021-07-20 RX ORDER — MAGNESIUM SULFATE HEPTAHYDRATE 40 MG/ML
4 INJECTION, SOLUTION INTRAVENOUS AS NEEDED
Status: DISCONTINUED | OUTPATIENT
Start: 2021-07-20 | End: 2021-07-24 | Stop reason: HOSPADM

## 2021-07-20 RX ADMIN — DOBUTAMINE HYDROCHLORIDE 8 MCG/KG/MIN: 100 INJECTION INTRAVENOUS at 18:41

## 2021-07-20 RX ADMIN — SACUBITRIL AND VALSARTAN 1 TABLET: 24; 26 TABLET, FILM COATED ORAL at 22:03

## 2021-07-20 RX ADMIN — Medication 400 MG: at 22:03

## 2021-07-20 RX ADMIN — METOPROLOL TARTRATE 12.5 MG: 25 TABLET, FILM COATED ORAL at 22:03

## 2021-07-20 RX ADMIN — APIXABAN 5 MG: 5 TABLET, FILM COATED ORAL at 22:03

## 2021-07-20 RX ADMIN — FUROSEMIDE 40 MG: 10 INJECTION, SOLUTION INTRAMUSCULAR; INTRAVENOUS at 15:03

## 2021-07-20 NOTE — CONSULTS
Cardiology Consultation Note.        Patient Name: Didier Dahl  Age/Sex: 41 y.o. male  : 1980  MRN: [id number]    Date of consultation: 2021  Consulting Physician: Owen Perez MD  Primary care Physician: Joslyn Rousseau APRN  Requesting Physician:   Alejandro Brunson MD  Reason for consultation: Shortness of breath      Subjective:       Chief Complaint: Shortness of breath and chest pain    History of Present Illness:  Didier Dahl is a 41 y.o. male     Body mass index is 25.05 kg/m².  With a past medical history significant for nonischemic cardiomyopathy with severe left ventricular systolic dysfunction with an ejection fraction of 10 to 15% s/p  single-chamber VVI [employer] [place] model number [id number] and serial number [id number] 2020, coronary angiogram done in 2019 which had not reveal of any evidence of any obstructive epicardial coronary artery disease, CTA of the chest done at [facility] which had not reveal of any evidence of any pulmonary embolism with a right heart catheterization which had revealed elevated pulmonary capillary wedge pressure, arterial hypertension, left ventricular thrombus on anticoagulation with Eliquis, moderate tricuspid regurgitation and mild to moderate mitral regurgitation, atrial septal defect with right-to-left shunting with positive bubble study on chronic anticoagulation with Eliquis, attention deficit hyperactivity disorder, history of scoliosis, gastroesophageal reflux disease, history of asthmatic bronchitis, evaluation at [facility] for cardiac transplant in [place].    Patient was evaluated at the cardiac transplant center.  Patient does have a follow-up appointment.    Patient presented to the hospital with symptoms of chest pain and shortness of breath.  Patient complains of having leg pain.  Patient questioning complains of having chest pain with left arm pain.  Patient has increasing shortness of  breath.  Patient has been compliant with his medication.  Patient had not been compliant with his low-salt diet.    Patient denies any delivery of shock.    Patient denies any fever chill patient denies any hemoptysis hematuria bright red blood per rectum.    Patient denies any symptoms of lightheaded dizziness.    Patient 10 point review of system except for what stated in the history of present is negative.    Concurrent  Medical History:  1.  Chest pain shortness of breath.  2.  Nonischemic cardiomyopathy with an ejection fraction of 10 to 15%.  3.  S/p  single-chamber VVI [employer] model number [id number] and serial number [id number] July 2020.  4.  Coronary angiogram done at [facility] in December 2019 which had not reveal of any evidence of any obstructive epicardial coronary artery disease.  5.  Left ventricular thrombus on chronic anticoagulation with Eliquis.  6.  Arterial hypertension.  7.  Hypertensive heart disease.  8.  Moderate tricuspid regurgitation and mild to moderate mitral regurgitation.  9.  Atrial septal defect with positive bubble study for right-to-left shunting.  10.  Evaluation for cardiac transplant at [facility] in [place]..  11.  Chronic back pain.  12.  Attention deficit hyperactivity disorder.  13.  History of asthmatic bronchitis.  14.  Gastroesophageal reflux disease.  15.  Prostate enlargement.  16.  History of scoliosis.  17.  Chronic anticoagulation with Eliquis  18.  Obstructive sleep apnea.  19.  Vitamin D deficiency.  20.  Anxiety  21.  Abnormal liver function tests        Past Surgical History:  1.  Left lung surgical intervention with removal of the nail.  2.  Varicose vein stripping by Dr. Barbosa.  3.  Coronary angiogram.  4.  Right heart catheterization.  5.  Electrophysiology study with subsequent single-chamber VVI [employer] model number [id number] and serial number [id number] July 2020.      Family History: Family history significant for  congestive heart failure      Social History: Smokes up to half pack per day remote history of drug abuse denies any current drug or alcohol abuse         Cardiac Risk Factors:   1.  Male.  2.  Arterial hypertension.  3.  Tobacco abuse.    Allergies:  Allergies   Allergen Reactions   • Chantix [Varenicline] Anaphylaxis       Medication:  Medications Prior to Admission   Medication Sig Dispense Refill Last Dose   • apixaban (ELIQUIS) 5 MG tablet tablet Take 5 mg by mouth.   7/19/2021 at Unknown time   • Calcium Carbonate Antacid 1250 MG/5ML Take  by mouth.   7/19/2021 at Unknown time   • digoxin (LANOXIN) 250 MCG tablet Take 1 tablet by mouth Daily. 30 tablet 1 7/20/2021 at Unknown time   • furosemide (LASIX) 80 MG tablet Take 1 tablet by mouth 2 (Two) Times a Day.   7/20/2021 at Unknown time   • lisdexamfetamine (VYVANSE) 70 MG capsule Take 70 mg by mouth Every Morning Pt takes 1/2 in the morning and 1/2 in the afternoon    7/20/2021 at Unknown time   • Magnesium Oxide 400 (240 Mg) MG tablet Take 1 tablet by mouth 2 (Two) Times a Day. 60 tablet 1 7/20/2021 at Unknown time   • metoprolol tartrate (LOPRESSOR) 25 MG tablet Take 0.5 tablets by mouth Every 12 (Twelve) Hours. 15 tablet 1 Past Week at Unknown time   • pantoprazole (Protonix) 40 MG EC tablet Take 1 tablet by mouth Daily. 30 tablet 5 7/20/2021 at Unknown time   • sacubitril-valsartan (ENTRESTO) 24-26 MG tablet Take 1 tablet by mouth Every 12 (Twelve) Hours. 60 tablet 1 7/20/2021 at Unknown time   • vitamin D (ERGOCALCIFEROL) 1.25 MG (06464 UT) capsule capsule Take 50,000 Units by mouth Every 7 (Seven) Days.   Past Week at Unknown time   • zolpidem (AMBIEN) 10 MG tablet Take 10 mg by mouth At Night As Needed for Sleep.   7/19/2021 at Unknown time   • albuterol sulfate  (90 Base) MCG/ACT inhaler Inhale 2 puffs Every 4 (Four) Hours As Needed for Wheezing. 1 inhaler 0    • bumetanide (BUMEX) 1 MG tablet    Unknown at Unknown time   • lidocaine-prilocaine  (EMLA) 2.5-2.5 % cream As Needed.   Unknown at Unknown time   • MAGnesium-Oxide 400 (241.3 Mg) MG tablet tablet Take 400 mg by mouth 2 (Two) Times a Day.      • metoprolol succinate XL (TOPROL-XL) 25 MG 24 hr tablet       • spironolactone (ALDACTONE) 25 MG tablet Take 0.5 tablets by mouth Daily. 30 tablet 1 Unknown at Unknown time           Review of Systems:       Constitutional:  Denies recent weight loss, weight gain, fever or chills, no change in exercise tolerance.     HENT:  Denies any hearing loss, epistaxis, hoarseness, or difficulty speaking.     Eyes: Wears eyeglasses or contact lenses     Respiratory:  Denies dyspnea with exertion,no cough, wheezing, or hemoptysis.     Cardiovascular: Positive for chest pain and shortness of breath.  Negative for palpitations,  orthopnea, PND, peripheral edema, syncope, or claudication.     Gastrointestinal:  Denies change in bowel habits, dyspepsia, ulcer disease, hematochezia, or melena.  No nausea, no vomiting, no hematemesis, no diarrhea or constipation.    Endocrine: Negative for cold intolerance, heat intolerance, polydipsia, polyphagia or polyuria. Denies any history of weight change or unintended weight loss.    Genitourinary: Negative for hematuria.  No frequent urination or nocturia.      Musculoskeletal: Denies any history of arthritic symptoms or back problems .  No joint pain, joint stiffness, joint swelling, muscle pain, muscle weakness or neck pain.    Skin:  Denies any change in hair or nails, rashes, or skin lesions.     Allergic/Immunologic: Negative.  Negative for environmental allergies, food allergies or immunocompromised state.     Neurological:  Denies any history of recurrent headaches, strokes, TIA, or seizure disorder.     Hematological: Denies excessive bleeding, easy bruising, fatigue, lymphadenopathy or petechiae or any bleeding disorders.     Psychiatric/Behavioral: Denies any history of depression, substance abuse, or change in cognitive  function. Denies any psychomotor reaction or tangential thought.  No depression, homicidal ideations or suicidal ideations.          Objective:     Objective:  Temp:  [97.6 °F (36.4 °C)-98 °F (36.7 °C)] 97.6 °F (36.4 °C)  Heart Rate:  [78-95] 89  Resp:  [18-20] 20  BP: (107-119)/(68-86) 112/71      Body mass index is 25.05 kg/m².           Physical Exam:   General Appearance:    Alert, oriented, cooperative, in no acute distress.   Head:    Normocephalic, atraumatic, without obvious abnormality.   Eyes:           SHRUTI.  Lids and lashes normal, conjunctivae and sclerae normal, no icterus, no pallor.   Ears:    Ears appear intact with no abnormalities noted.   Throat:   Mucous membranes pink and moist.   Neck:   Supple, trachea midline, no carotid bruit, no organomegaly or JVD.   Lungs:     Clear to auscultation and percussion, respirations regular, even and unlabored. No wheezes, rales or rhonchi.    Heart:    Regular rhythm and normal rate, normal S1 and S2, no murmur, no gallop, no rub, no click.   Abdomen:     Soft, nontender, nondistended, no guarding, no rebound tenderness, normal bowel sounds in all four quadrants, no masses, liver and spleen nonpalpable.   Genitalia:    Deferred.   Extremities:   Moves all extremities well, no edema, no cyanosis, no  redness, no clubbing.   Pulses:   Pulses palpable and equal bilaterally.   Skin:   Moist and warm. No bleeding, bruising or rash.   Neurologic/Psychiatric:   Alert and oriented to person, place, and time.  Motor, power and tone in upper and lower extremities are grossly intact. No focal neurological deficits. Normal cognitive function. No psychomotor reaction or tangential thought. No depression, homicidal ideations and suicidal ideations.       Medication Review:   Current Facility-Administered Medications   Medication Dose Route Frequency Provider Last Rate Last Admin   • acetaminophen (TYLENOL) tablet 650 mg  650 mg Oral Q4H PRN Alejandro Brunson MD        Or    • acetaminophen (TYLENOL) suppository 650 mg  650 mg Rectal Q4H PRN Alejandro Brunson MD       • albuterol (PROVENTIL) nebulizer solution 0.083% 2.5 mg/3mL  2.5 mg Nebulization Q4H PRN Alejandor Brunson MD       • apixaban (ELIQUIS) tablet 5 mg  5 mg Oral Q12H Alejandro Brunson MD       • calcium carbonate (TUMS) chewable tablet 500 mg (200 mg elemental)  2 tablet Oral BID PRN Alejandro Brunson MD       • [START ON 7/21/2021] digoxin (LANOXIN) tablet 250 mcg  250 mcg Oral Daily Alejandro Brunson MD       • DOBUTamine (DOBUTREX) 1 mg/mL infusion  8 mcg/kg/min Intravenous Continuous Alejandro Brunson MD       • [START ON 7/21/2021] furosemide (LASIX) tablet 80 mg  80 mg Oral BID Alejandro Brunson MD       • magnesium oxide (MAG-OX) tablet 400 mg  400 mg Oral BID Alejandro Brunson MD       • Magnesium Sulfate 2 gram Bolus, followed by 8 gram infusion (total Mg dose 10 grams)- Mg less than or equal to 1mg/dL  2 g Intravenous PRN Alejandro Brunson MD        Or   • Magnesium Sulfate 2 gram / 50mL Infusion (GIVE X 3 BAGS TO EQUAL 6GM TOTAL DOSE) - Mg 1.1 - 1.5 mg/dl  2 g Intravenous PRN Alejandro Brunson MD        Or   • Magnesium Sulfate 4 gram infusion- Mg 1.6-1.9 mg/dL  4 g Intravenous PRN Alejandro Brunson MD       • metoprolol tartrate (LOPRESSOR) half tablet 12.5 mg  12.5 mg Oral Nightly Alejandro Brunson MD       • ondansetron (ZOFRAN) tablet 4 mg  4 mg Oral Q6H PRN Alejandro Brunson MD        Or   • ondansetron (ZOFRAN) injection 4 mg  4 mg Intravenous Q6H PRN Alejandro Brunson MD       • [START ON 7/21/2021] pantoprazole (PROTONIX) EC tablet 40 mg  40 mg Oral Q AM Alejandro Brunson MD       • potassium chloride (KLOR-CON) packet 40 mEq  40 mEq Oral PRN Alejandro Brunson MD       • potassium chloride (MICRO-K) CR capsule 40 mEq  40 mEq Oral PRN Alejandro Brunson MD       • sacubitril-valsartan (ENTRESTO) 24-26 MG tablet 1 tablet  1 tablet Oral Q12H Alejandro Brunson MD       • sodium chloride 0.9 % flush 10 mL  10 mL Intravenous PRN Brenna Parrish  MARCIE Beaver       • sodium chloride 0.9 % flush 10 mL  10 mL Intravenous Q12H Alejandro Brunson MD       • sodium chloride 0.9 % flush 10 mL  10 mL Intravenous PRN Alejandro Brunson MD       • [START ON 7/21/2021] spironolactone (ALDACTONE) half tablet 12.5 mg  12.5 mg Oral Daily Alejandro Brunson MD       • zolpidem (AMBIEN) tablet 10 mg  10 mg Oral Nightly PRN Alejandro Brunson MD           Lab Review:     Results from last 7 days   Lab Units 07/20/21  1233   SODIUM mmol/L 135*   POTASSIUM mmol/L 4.2   CHLORIDE mmol/L 98   CO2 mmol/L 25.0   BUN mg/dL 24*   CREATININE mg/dL 1.09   CALCIUM mg/dL 9.4   BILIRUBIN mg/dL 2.0*   ALK PHOS U/L 110   ALT (SGPT) U/L 21   AST (SGOT) U/L 30   GLUCOSE mg/dL 102*     Results from last 7 days   Lab Units 07/20/21  1625 07/20/21  1233   TROPONIN T ng/mL 0.010 0.013         Results from last 7 days   Lab Units 07/20/21  1233   WBC 10*3/mm3 4.83   HEMOGLOBIN g/dL 12.2*   HEMATOCRIT % 39.0   PLATELETS 10*3/mm3 206     Results from last 7 days   Lab Units 07/20/21  1306   INR  1.39*   APTT seconds 31.5                       EKG:   ECG/EMG Results (last 24 hours)     Procedure Component Value Units Date/Time    ECG 12 Lead [[id number]] Collected: 07/20/21 1221     Updated: 07/20/21 1336     QT Interval 352 ms      QTC Interval 413 ms     Narrative:      Test Reason : SOA  Blood Pressure :   */*   mmHG  Vent. Rate :  83 BPM     Atrial Rate :  83 BPM     P-R Int : 180 ms          QRS Dur : 116 ms      QT Int : 352 ms       P-R-T Axes :  40 -38 114 degrees     QTc Int : 413 ms    Normal sinus rhythm  Nonspecific intraventricular conduction delay  Possible Left atrial enlargement  Left axis deviation  Left ventricular hypertrophy with QRS widening and repolarization abnormality  Cannot rule out Septal infarct , age undetermined  Abnormal ECG  When compared with ECG of 30-DEC-2020 20:15,  Minimal criteria for Septal infarct are now Present    Referred By: ERDR           Confirmed By: REENA RYAN           ECHO:  Results for orders placed during the hospital encounter of 07/23/20    Adult Transthoracic Echo Limited W/ Cont if Necessary Per Protocol    Interpretation Summary  · Left atrial cavity size is moderate-to-severely dilated.  · Mild mitral valve regurgitation is present  · Mild to moderate tricuspid valve regurgitation is present.  · The left ventricular cavity is mildly dilated.  · Mildly reduced right ventricular systolic function noted.  · The following left ventricular wall segments are hypokinetic: mid anterior, apical anterior, basal anterolateral, mid anterolateral, apical lateral, basal inferolateral, mid inferolateral, apical inferior, mid inferior, apical septal, basal inferoseptal, mid inferoseptal, apex hypokinetic, mid anteroseptal, basal anterior, basal inferior and basal inferoseptal.       Imaging:  Imaging Results (Last 24 Hours)     Procedure Component Value Units Date/Time    XR Chest 2 View [[id number]] Collected: 07/20/21 1308     Updated: 07/20/21 1336    Narrative:      Chest x-ray PA and lateral           CLINICAL INDICATION: Shortness of breath.     COMPARISON: Chest x-ray December 30, 2020. None    FINDINGS: Cardiac silhouette is enlarged in size. Pulmonary  vascularity is unremarkable.   Pacemaker, AICD lead with tip in right ventricle.  No focal infiltrate or consolidation.  No pleural effusion.  No  pneumothorax.  Minimal discoid fibrotic changes right lung base.    Impression:      Cardiomegaly and pacemaker, AICD lead with tip in  right ventricle. Minimal discoid fibrotic changes right lung  base. Otherwise unremarkable chest.    Electronically signed by:  Yamil Bennett MD  7/20/2021 1:35 PM CDT  Workstation: [id number]          I personally viewed and interpreted the patient's EKG/Telemetry data.    Assessment:   1.  Chest pain shortness of breath.  2.  Nonischemic cardiomyopathy with left ventricular systolic dysfunction with an ejection fraction of 15 to 20%.  3.  S/p  single-chamber AICD placement [employer] [facility].  4.  Left ventricular thrombus on chronic anticoagulation with Eliquis.          Plan:   1.  Chest pain.  Patient troponin was negative.  Patient had undergone previous coronary angiogram which had not reveal of any evidence of any obstructive epicardial coronary artery disease and patient had nonischemic cardiomyopathy.  Patient at the present time would not be subjected to any invasive evaluation for symptoms of chest pain.  Patient symptoms of chest pain is atypical.    2.  Shortness of breath.  Patient has nonischemic cardiomyopathy with chronic systolic heart failure.  Patient is on Entresto metoprolol.  Patient has a single-chamber [employer] AICD placement.  Patient was evaluated at the cardiac transplant center at [facility] in [place].  Patient was not a candidate for LVAD secondary to the patient LV thrombus.  Patient would be started on IV dobutamine infusion and would continue with the metoprolol and Entresto and the Lasix.  Patient has been counseled extensively to be compliant with a low-salt diet.  Would follow the renal function and would continue gentle diuresis.    3.  [employer] single-chamber AICD.  Patient would undergo AICD interrogation to evaluate the AICD function.  Patient has not had any delivery of shock.    4.  Chronic anticoagulation with Eliquis.  Patient has not complained of having any episodes of any bleeding diathesis of hemoptysis hematuria bright red blood per rectum.    5.  Valvular insufficiency with moderate tricuspid regurgitation and mild to moderate mitral regurgitation.  Patient would be continued on the Entresto and would increase the dose as tolerated the patient blood pressure has been on the low side.  Patient had not complained of having any symptoms of dizziness.    6.  Attention deficit hyperactivity disorder is noted.    7.  Obstructive sleep apnea.  Patient has been recommended to be compliant with the  CPAP.    Thank you for the consultation.    The above plan of management were discussed with the patient      Time: Time spent in face-to-face evaluation of greater than 55 minutes interacting, formulating, examining and discussing the plan with the patient with 50% of greater time spent in face-to-face interaction.    Electronically signed by Owen Perez MD, 07/20/21, 6:24 PM CDT.    Dictated utilizing Dragon dictation.

## 2021-07-20 NOTE — ED PROVIDER NOTES
Subjective   Patient presents to the ER with c/o shortness of breath, 8-10 lb weight gain, cough and generalized body aches since Tuesday last week. He reports intermittent chest pain that has been more persistent today described as pressure midchest, sharp to left chest that radiates down his left arm. Patient has a strong cardiac history and currently has a defibrilator. He states he was discharged from  in May and there was discussion at that time about him going on [facility] but he ultimately decided he was not ready for that. He is in the process of being placed on the heart transplant list. Patient has a history of CHF and states he feels like he is in fluid overload. He report his normal weight is 176 but today he was 182 at home and 186 here. He reports some generalized abdominal pain with N/V/D. Similar to his chronic abdominal pain.           Review of Systems   Constitutional: Positive for fatigue. Negative for chills and fever.   HENT: Negative.    Respiratory: Positive for cough, chest tightness and shortness of breath. Negative for wheezing.    Cardiovascular: Positive for chest pain. Negative for palpitations.   Gastrointestinal: Positive for abdominal pain. Negative for diarrhea, nausea and vomiting.   Genitourinary: Negative.    Musculoskeletal: Positive for back pain and myalgias.   Skin: Negative.    Neurological: Positive for weakness. Negative for dizziness, speech difficulty, light-headedness, numbness and headaches.   Hematological: Bruises/bleeds easily.   Psychiatric/Behavioral: Negative.        Past Medical History:   Diagnosis Date   • Anxiety    • Callus    • Clotting disorder (CMS/HCC)    • Elevated liver enzymes    • GERD (gastroesophageal reflux disease)    • Heart trouble     zoll vest defibrillator   • Hypertension    • PE (pulmonary thromboembolism) (CMS/HCC)    • Pneumonia        Allergies   Allergen Reactions   • Chantix [Varenicline] Anaphylaxis       Past Surgical History:  "  Procedure Laterality Date   • CARDIAC ELECTROPHYSIOLOGY PROCEDURE N/A 2020    Procedure: ICD SC new;  Surgeon: Ivis Gorman MD;  Location: [facility] INVASIVE LOCATION;  Service: Cardiology;  Laterality: N/A;   • VASCULAR SURGERY         Family History   Problem Relation Age of Onset   • Diabetes Other    • Heart disease Other    • Hypertension Other    • Anxiety disorder Other    • Allergies Other        Social History     Socioeconomic History   • Marital status: Single     Spouse name: Not on file   • Number of children: Not on file   • Years of education: Not on file   • Highest education level: Not on file   Tobacco Use   • Smoking status: Former Smoker     Packs/day: 0.50     Years: 10.00     Pack years: 5.00     Types: Cigarettes     Quit date: 3/1/2020     Years since quittin.3   • Smokeless tobacco: Never Used   Vaping Use   • Vaping Use: Former   • Substances: Flavoring   • Devices: Refillable tank   Substance and Sexual Activity   • Alcohol use: Yes     Comment: 2021 - Patient states he consumes alcoholic beverages occasionally.   • Drug use: No   • Sexual activity: Defer           Objective    /79 (Patient Position: Lying)   Pulse 82   Temp 98 °F (36.7 °C) (Infrared)   Resp 20   Ht 180.3 cm (71\")   Wt 84.8 kg (186 lb 14.4 oz)   SpO2 97%   BMI 26.07 kg/m²     Physical Exam  Vitals and nursing note reviewed.   Constitutional:       General: He is not in acute distress.     Appearance: He is well-developed. He is ill-appearing.      Comments: Appears fatigued   HENT:      Head: Normocephalic and atraumatic.   Cardiovascular:      Rate and Rhythm: Normal rate and regular rhythm.      Heart sounds: Normal heart sounds. No murmur heard.     Pulmonary:      Effort: Pulmonary effort is normal. No respiratory distress.      Breath sounds: Normal breath sounds. No wheezing.   Abdominal:      General: Bowel sounds are normal. There is no distension.      Palpations: Abdomen is " soft.      Tenderness: There is abdominal tenderness (generalized ).   Musculoskeletal:         General: Normal range of motion.      Cervical back: Normal range of motion and neck supple.   Skin:     General: Skin is warm and dry.      Capillary Refill: Capillary refill takes less than 2 seconds.   Neurological:      Mental Status: He is alert and oriented to person, place, and time.      Coordination: Coordination normal.   Psychiatric:         Behavior: Behavior normal.         Thought Content: Thought content normal.         Judgment: Judgment normal.         Procedures  Results for orders placed or performed during the hospital encounter of 07/20/21   COVID-19 and FLU A/B PCR - Swab, Nasopharynx    Specimen: Nasopharynx; Swab   Result Value Ref Range    COVID19 Not Detected Not Detected - Ref. Range    Influenza A PCR Not Detected Not Detected    Influenza B PCR Not Detected Not Detected   Comprehensive Metabolic Panel    Specimen: Blood   Result Value Ref Range    Glucose 102 (H) 65 - 99 mg/dL    BUN 24 (H) 6 - 20 mg/dL    Creatinine 1.09 0.76 - 1.27 mg/dL    Sodium 135 (L) 136 - 145 mmol/L    Potassium 4.2 3.5 - 5.2 mmol/L    Chloride 98 98 - 107 mmol/L    CO2 25.0 22.0 - 29.0 mmol/L    Calcium 9.4 8.6 - 10.5 mg/dL    Total Protein 6.9 6.0 - 8.5 g/dL    Albumin 4.10 3.50 - 5.20 g/dL    ALT (SGPT) 21 1 - 41 U/L    AST (SGOT) 30 1 - 40 U/L    Alkaline Phosphatase 110 39 - 117 U/L    Total Bilirubin 2.0 (H) 0.0 - 1.2 mg/dL    eGFR Non African Amer 75 >60 mL/min/1.73    Globulin 2.8 gm/dL    A/G Ratio 1.5 g/dL    BUN/Creatinine Ratio 22.0 7.0 - 25.0    Anion Gap 12.0 5.0 - 15.0 mmol/L   Protime-INR    Specimen: Blood   Result Value Ref Range    Protime 16.8 (H) 11.1 - 15.3 Seconds    INR 1.39 (H) 0.80 - 1.20   aPTT    Specimen: Blood   Result Value Ref Range    PTT 31.5 20.0 - 40.3 seconds   Urinalysis With Microscopic If Indicated (No Culture) - Urine, Clean Catch    Specimen: Urine, Clean Catch   Result Value  Ref Range    Color, UA Dark Yellow Yellow, Straw, Dark Yellow, Manasa    Appearance, UA Clear Clear    pH, UA 6.0 5.0 - 9.0    Specific Gravity, UA 1.020 1.003 - 1.030    Glucose, UA Negative Negative    Ketones, UA Negative Negative    Bilirubin, UA Small (1+) (A) Negative    Blood, UA Negative Negative    Protein, UA Negative Negative    Leuk Esterase, UA Negative Negative    Nitrite, UA Negative Negative    Urobilinogen, UA 1.0 E.U./dL 0.2 - 1.0 E.U./dL   Troponin    Specimen: Blood   Result Value Ref Range    Troponin T 0.013 0.000 - 0.030 ng/mL   BNP    Specimen: Blood   Result Value Ref Range    proBNP 8,369.0 (H) 0.0 - 450.0 pg/mL   Urine Drug Screen - Urine, Clean Catch    Specimen: Urine, Clean Catch   Result Value Ref Range    THC, Screen, Urine Negative Negative    Phencyclidine (PCP), Urine Negative Negative    Cocaine Screen, Urine Negative Negative    Methamphetamine, Ur Negative Negative    Opiate Screen Negative Negative    Amphetamine Screen, Urine Positive (A) Negative    Benzodiazepine Screen, Urine Negative Negative    Tricyclic Antidepressants Screen Negative Negative    Methadone Screen, Urine Negative Negative    Barbiturates Screen, Urine Negative Negative    Oxycodone Screen, Urine Negative Negative    Propoxyphene Screen Negative Negative    Buprenorphine, Screen, Urine Negative Negative   Digoxin Level    Specimen: Blood   Result Value Ref Range    Digoxin 0.90 0.60 - 1.20 ng/mL   CBC Auto Differential    Specimen: Blood   Result Value Ref Range    WBC 4.83 3.40 - 10.80 10*3/mm3    RBC 4.44 4.14 - 5.80 10*6/mm3    Hemoglobin 12.2 (L) 13.0 - 17.7 g/dL    Hematocrit 39.0 37.5 - 51.0 %    MCV 87.8 79.0 - 97.0 fL    MCH 27.5 26.6 - 33.0 pg    MCHC 31.3 (L) 31.5 - 35.7 g/dL    RDW 18.4 (H) 12.3 - 15.4 %    RDW-SD 58.7 (H) 37.0 - 54.0 fl    MPV 10.4 6.0 - 12.0 fL    Platelets 206 140 - 450 10*3/mm3    Neutrophil % 59.1 42.7 - 76.0 %    Lymphocyte % 27.1 19.6 - 45.3 %    Monocyte % 11.6 5.0 - 12.0  %    Eosinophil % 1.0 0.3 - 6.2 %    Basophil % 1.0 0.0 - 1.5 %    Immature Grans % 0.2 0.0 - 0.5 %    Neutrophils, Absolute 2.85 1.70 - 7.00 10*3/mm3    Lymphocytes, Absolute 1.31 0.70 - 3.10 10*3/mm3    Monocytes, Absolute 0.56 0.10 - 0.90 10*3/mm3    Eosinophils, Absolute 0.05 0.00 - 0.40 10*3/mm3    Basophils, Absolute 0.05 0.00 - 0.20 10*3/mm3    Immature Grans, Absolute 0.01 0.00 - 0.05 10*3/mm3    nRBC 0.0 0.0 - 0.2 /100 WBC   ECG 12 Lead   Result Value Ref Range    QT Interval 352 ms    QTC Interval 413 ms   Green Top (Gel)   Result Value Ref Range    Extra Tube Hold for add-ons.    Lavender Top   Result Value Ref Range    Extra Tube hold for add-on    Gold Top - SST   Result Value Ref Range    Extra Tube Hold for add-ons.      XR Chest 2 View    Result Date: 7/20/2021  Narrative: Chest x-ray PA and lateral CLINICAL INDICATION: Shortness of breath. COMPARISON: Chest x-ray December 30, 2020. None FINDINGS: Cardiac silhouette is enlarged in size. Pulmonary vascularity is unremarkable. Pacemaker, AICD lead with tip in right ventricle. No focal infiltrate or consolidation.  No pleural effusion.  No pneumothorax. Minimal discoid fibrotic changes right lung base.    Impression: Cardiomegaly and pacemaker, AICD lead with tip in right ventricle. Minimal discoid fibrotic changes right lung base. Otherwise unremarkable chest. Electronically signed by:  Yamil Bennett MD  7/20/2021 1:35 PM CDT Workstation: [id number]             ED Course  ED Course as of Jul 20 1554   Tue Jul 20, 2021   1547 Dr. Brunson paged.     [SH]   1546 Spoke with Dr. Brunson who agrees to admission.     [SH]      ED Course User Index  [SH] Brenna Parrish APRN                                         HEART Score (for prediction of 6-week risk of major adverse cardiac event) reviewed and/or performed as part of the patient evaluation and treatment planning process.  The result associated with this review/performance is: 4       MDM    Final  diagnoses:   Systolic congestive heart failure, unspecified HF chronicity (CMS/[facility])   Chest pain, unspecified type       ED Disposition  ED Disposition     ED Disposition Condition Comment    Decision to Admit  Level of Care: Telemetry [5]   Diagnosis: Systolic congestive heart failure, unspecified HF chronicity (CMS/[facility]) [[id number]]   Admitting Physician: NAFISA FARRIS [[id number]]   Attending Physician: NAFISA FARRIS [[id number]]            No follow-up provider specified.       Medication List      No changes were made to your prescriptions during this visit.          Brenna Parrish, APRN  07/25/21 [id number]

## 2021-07-20 NOTE — ED NOTES
Assisted pt to use urinal at bedside X2 assist. Pt gait steady     Isabela Zepeda RN  07/20/21 5139

## 2021-07-20 NOTE — H&P
[facility] Medicine Admission      Date of Admission: 7/20/2021      Primary Care Physician: Joslyn Rousseau APRN      Chief Complaint: Shortness of breath/chest discomfort    HPI:Patient is a 41 year old male with medical hx notable for Systolic CHF, GERD, HTN, and hx of PE who presented to the ED due to worsening chest discomfort, shortness of breath, and weight gain for the past one week.  He notes he has been to  and evaluated for heart transplant in the past and follows with Dr. Perez here.  He denies any fever or chills but notes issues recently with cough.  He states that he has been taking his diuretics as prescribed and attempts to follow a fluid restriction at home.  He denies any vomiting but notes occasional nausea.  He also reports some left arm discomfort.     Evaluation in the ED was notable for CBC which showed Hgb of 12.2 but was otherwise unremarkable.  Digoxin level and troponin T were both normal.  ProBNP was elevated at 8,369.  CMP showed glucose of 102, BUN 24, sodium 135, and total bilirubin 2.0.  PTT was normal.  PT/INR was 16.8 and 1.39.  UA was negative.  UDS showed Amphetamines but was otherwise unremarkable.  COVID and flu screening was normal.      Concurrent Medical History:  has a past medical history of Anxiety, Callus, Clotting disorder (CMS/HCC), Elevated liver enzymes, GERD (gastroesophageal reflux disease), Heart trouble, Hypertension, PE (pulmonary thromboembolism) (CMS/HCC), and Pneumonia.    Past Surgical History:  has a past surgical history that includes Vascular surgery and Cardiac electrophysiology procedure (N/A, 7/28/2020).    Family History: family history includes Allergies in an other family member; Anxiety disorder in an other family member; Diabetes in an other family member; Heart disease in an other family member; Hypertension in an other family member. No changes    Social History:  reports that he quit smoking  about 16 months ago. His smoking use included cigarettes. He has a 5.00 pack-year smoking history. He has never used smokeless tobacco. He reports current alcohol use. He reports that he does not use drugs.    Allergies:   Allergies   Allergen Reactions   • Chantix [Varenicline] Anaphylaxis       Medications:   Prior to Admission medications    Medication Sig Start Date End Date Taking? Authorizing Provider   albuterol sulfate  (90 Base) MCG/ACT inhaler Inhale 2 puffs Every 4 (Four) Hours As Needed for Wheezing. 12/4/19   Naun Herbert MD   apixaban (ELIQUIS) 5 MG tablet tablet Take 5 mg by mouth.    Lena Cabrera MD   bumetanide (BUMEX) 1 MG tablet  5/21/21   Lena Cabrera MD   Calcium Carbonate Antacid 1250 MG/5ML Take  by mouth.    Lena Cabrera MD   digoxin (LANOXIN) 250 MCG tablet Take 1 tablet by mouth Daily. 2/9/20   Ricardo Grubbs MD   furosemide (LASIX) 80 MG tablet Take 1 tablet by mouth 2 (Two) Times a Day. 5/14/21   Lena Cabrera MD   lidocaine-prilocaine (EMLA) 2.5-2.5 % cream As Needed.    Lena Cabrera MD   lisdexamfetamine (VYVANSE) 70 MG capsule Take 70 mg by mouth Every Morning Pt takes 1/2 in the morning and 1/2 in the afternoon     Lena Cabrera MD   Magnesium Oxide 400 (240 Mg) MG tablet Take 1 tablet by mouth 2 (Two) Times a Day. 2/8/20   Ricardo Grubbs MD   MAGnesium-Oxide 400 (241.3 Mg) MG tablet tablet Take 400 mg by mouth 2 (Two) Times a Day. 4/21/21   Lena Cabrera MD   metoprolol succinate XL (TOPROL-XL) 25 MG 24 hr tablet  7/13/21   Lena Cabrera MD   metoprolol tartrate (LOPRESSOR) 25 MG tablet Take 0.5 tablets by mouth Every 12 (Twelve) Hours. 2/8/20   Ricardo Grubbs MD   pantoprazole (Protonix) 40 MG EC tablet Take 1 tablet by mouth Daily. 10/12/20   Alejandro Hughes PA-C   sacubitril-valsartan (ENTRESTO) 24-26 MG tablet Take 1 tablet by mouth Every 12 (Twelve) Hours. 2/8/20   Kalynu,  Ricardo AGOSTO MD   spironolactone (ALDACTONE) 25 MG tablet Take 0.5 tablets by mouth Daily. 2/9/20   [facility] MD   vitamin D (ERGOCALCIFEROL) 1.25 MG (88071 UT) capsule capsule Take 50,000 Units by mouth Every 7 (Seven) Days.    Provider, MD Lena   zolpidem (AMBIEN) 10 MG tablet Take 10 mg by mouth At Night As Needed for Sleep.    Provider, MD Lena       Review of Systems:  Review of Systems   Constitutional: Positive for unexpected weight change. Negative for chills and fever.   HENT: Negative for congestion.    Respiratory: Positive for cough and shortness of breath. Negative for wheezing.    Cardiovascular: Positive for chest pain. Negative for palpitations and leg swelling.   Gastrointestinal: Positive for abdominal distention. Negative for abdominal pain, constipation, diarrhea, nausea and vomiting.   Genitourinary: Negative.    Musculoskeletal: Negative.    Skin: Negative.    Neurological: Negative.    Psychiatric/Behavioral: Negative.    All other systems reviewed and are negative.     Otherwise complete ROS is negative except as mentioned above.    Physical Exam:   Temp:  [98 °F (36.7 °C)] 98 °F (36.7 °C)  Heart Rate:  [78-89] 82  Resp:  [18-20] 20  BP: (107-116)/(69-84) 111/79  Physical Exam  Constitutional:       General: He is not in acute distress.     Appearance: He is not toxic-appearing.   HENT:      Head: Normocephalic and atraumatic.      Right Ear: External ear normal.      Left Ear: External ear normal.      Nose: Nose normal.      Mouth/Throat:      Mouth: Mucous membranes are moist.      Pharynx: Oropharynx is clear.   Eyes:      Conjunctiva/sclera: Conjunctivae normal.   Cardiovascular:      Rate and Rhythm: Normal rate and regular rhythm.      Pulses: Normal pulses.      Heart sounds: Normal heart sounds.   Pulmonary:      Effort: Pulmonary effort is normal. No respiratory distress.      Breath sounds: Normal breath sounds.   Abdominal:      General: Bowel sounds are  normal.      Palpations: Abdomen is soft.      Tenderness: There is no abdominal tenderness.   Musculoskeletal:         General: No swelling.      Cervical back: Neck supple.   Skin:     General: Skin is warm and dry.      Capillary Refill: Capillary refill takes less than 2 seconds.   Neurological:      General: No focal deficit present.      Mental Status: He is alert and oriented to person, place, and time. Mental status is at baseline.      Coordination: Coordination normal.   Psychiatric:         Mood and Affect: Mood normal.         Behavior: Behavior normal.           Results Reviewed:  I have personally reviewed current lab, radiology, and data and agree with results.  Lab Results (last 24 hours)     Procedure Component Value Units Date/Time    COVID-19 and FLU A/B PCR - Swab, Nasopharynx [[id number]]  (Normal) Collected: 07/20/21 1509    Specimen: Swab from Nasopharynx Updated: 07/20/21 1544     COVID19 Not Detected     Influenza A PCR Not Detected     Influenza B PCR Not Detected    Narrative:      Fact sheet for providers: https://www.fda.gov/media/256986/download    Fact sheet for patients: https://www.fda.gov/media/019094/download    Test performed by PCR.    Urinalysis With Microscopic If Indicated (No Culture) - Urine, Clean Catch [[id number]]  (Abnormal) Collected: 07/20/21 1441    Specimen: Urine, Clean Catch Updated: 07/20/21 1459     Color, UA Dark Yellow     Appearance, UA Clear     pH, UA 6.0     Specific Gravity, UA 1.020     Glucose, UA Negative     Ketones, UA Negative     Bilirubin, UA Small (1+)     Blood, UA Negative     Protein, UA Negative     Leuk Esterase, UA Negative     Nitrite, UA Negative     Urobilinogen, UA 1.0 E.U./dL    Narrative:      Urine microscopic not indicated.    Urine Drug Screen - Urine, Clean Catch [[id number]]  (Abnormal) Collected: 07/20/21 1441    Specimen: Urine, Clean Catch Updated: 07/20/21 1458     THC, Screen, Urine Negative     Phencyclidine (PCP), Urine  Negative     Cocaine Screen, Urine Negative     Methamphetamine, Ur Negative     Opiate Screen Negative     Amphetamine Screen, Urine Positive     Benzodiazepine Screen, Urine Negative     Tricyclic Antidepressants Screen Negative     Methadone Screen, Urine Negative     Barbiturates Screen, Urine Negative     Oxycodone Screen, Urine Negative     Propoxyphene Screen Negative     Buprenorphine, Screen, Urine Negative    Narrative:      Cutoff For Drugs Screened:    Amphetamines               500 ng/ml  Barbiturates               200 ng/ml  Benzodiazepines            150 ng/ml  Cocaine                    150 ng/ml  Methadone                  200 ng/ml  Opiates                    100 ng/ml  Phencyclidine               25 ng/ml  THC                            50 ng/ml  Methamphetamine            500 ng/ml  Tricyclic Antidepressants  300 ng/ml  Oxycodone                  100 ng/ml  Propoxyphene               300 ng/ml  Buprenorphine               10 ng/ml    The normal value for all drugs tested is negative. This report includes unconfirmed screening results, with the cutoff values listed, to be used for medical treatment purposes only.  Unconfirmed results must not be used for non-medical purposes such as employment or legal testing.  Clinical consideration should be applied to any drug of abuse test, particularly when unconfirmed results are used.      [place] Draw [[id number]] Collected: 07/20/21 1233    Specimen: Blood Updated: 07/20/21 1345    Narrative:      The following orders were created for panel order [place] Draw.  Procedure                               Abnormality         Status                     ---------                               -----------         ------                     Green Top (Gel)[[id number]]                                  Final result               Lavender Top[[id number]]                                     Final result               Gold Top - SST[[id number]]                                    Final result                 Please view results for these tests on the individual orders.    Green Top (Gel) [[id number]] Collected: 07/20/21 1233    Specimen: Blood Updated: 07/20/21 1345     Extra Tube Hold for add-ons.     Comment: Auto resulted.       Lavender Top [[id number]] Collected: 07/20/21 1233    Specimen: Blood Updated: 07/20/21 1345     Extra Tube hold for add-on     Comment: Auto resulted       Gold Top - SST [[id number]] Collected: 07/20/21 1233    Specimen: Blood Updated: 07/20/21 1345     Extra Tube Hold for add-ons.     Comment: Auto resulted.       Troponin [[id number]]  (Normal) Collected: 07/20/21 1233    Specimen: Blood Updated: 07/20/21 1342     Troponin T 0.013 ng/mL     Narrative:      Troponin T Reference Range:  <= 0.03 ng/mL-   Negative for AMI  >0.03 ng/mL-     Abnormal for myocardial necrosis.  Clinicians would have to utilize clinical acumen, EKG, Troponin and serial changes to determine if it is an Acute Myocardial Infarction or myocardial injury due to an underlying chronic condition.       Results may be falsely decreased if patient taking Biotin.      aPTT [[id number]]  (Normal) Collected: 07/20/21 1306    Specimen: Blood Updated: 07/20/21 1333     PTT 31.5 seconds     Narrative:      The recommended Heparin therapeutic range is 68-97 seconds.    Protime-INR [[id number]]  (Abnormal) Collected: 07/20/21 1306    Specimen: Blood Updated: 07/20/21 1333     Protime 16.8 Seconds      INR 1.39    Narrative:      Therapeutic range for most indications is 2.0-3.0 INR,  or 2.5-3.5 for mechanical heart valves.    Digoxin Level [[id number]]  (Normal) Collected: 07/20/21 1233    Specimen: Blood Updated: 07/20/21 1331     Digoxin 0.90 ng/mL     BNP [[id number]]  (Abnormal) Collected: 07/20/21 1233    Specimen: Blood Updated: 07/20/21 1330     proBNP 8,369.0 pg/mL     Narrative:      Among patients with dyspnea, NT-proBNP is highly sensitive for the detection of acute congestive heart failure. In  addition NT-proBNP of <300 pg/ml effectively rules out acute congestive heart failure with 99% negative predictive value.    Results may be falsely decreased if patient taking Biotin.      CBC & Differential [[id number]]  (Abnormal) Collected: 07/20/21 1233    Specimen: Blood Updated: 07/20/21 1318    Narrative:      The following orders were created for panel order CBC & Differential.  Procedure                               Abnormality         Status                     ---------                               -----------         ------                     CBC Auto Differential[[id number]]        Abnormal            Final result                 Please view results for these tests on the individual orders.    CBC Auto Differential [[id number]]  (Abnormal) Collected: 07/20/21 1233    Specimen: Blood Updated: 07/20/21 1318     WBC 4.83 10*3/mm3      RBC 4.44 10*6/mm3      Hemoglobin 12.2 g/dL      Hematocrit 39.0 %      MCV 87.8 fL      MCH 27.5 pg      MCHC 31.3 g/dL      RDW 18.4 %      RDW-SD 58.7 fl      MPV 10.4 fL      Platelets 206 10*3/mm3      Neutrophil % 59.1 %      Lymphocyte % 27.1 %      Monocyte % 11.6 %      Eosinophil % 1.0 %      Basophil % 1.0 %      Immature Grans % 0.2 %      Neutrophils, Absolute 2.85 10*3/mm3      Lymphocytes, Absolute 1.31 10*3/mm3      Monocytes, Absolute 0.56 10*3/mm3      Eosinophils, Absolute 0.05 10*3/mm3      Basophils, Absolute 0.05 10*3/mm3      Immature Grans, Absolute 0.01 10*3/mm3      nRBC 0.0 /100 WBC     Comprehensive Metabolic Panel [[id number]]  (Abnormal) Collected: 07/20/21 1233    Specimen: Blood Updated: 07/20/21 1254     Glucose 102 mg/dL      BUN 24 mg/dL      Creatinine 1.09 mg/dL      Sodium 135 mmol/L      Potassium 4.2 mmol/L      Chloride 98 mmol/L      CO2 25.0 mmol/L      Calcium 9.4 mg/dL      Total Protein 6.9 g/dL      Albumin 4.10 g/dL      ALT (SGPT) 21 U/L      AST (SGOT) 30 U/L      Alkaline Phosphatase 110 U/L      Total Bilirubin 2.0 mg/dL       eGFR Non African Amer 75 mL/min/1.73      Globulin 2.8 gm/dL      A/G Ratio 1.5 g/dL      BUN/Creatinine Ratio 22.0     Anion Gap 12.0 mmol/L     Narrative:      GFR Normal >60  Chronic Kidney Disease <60  Kidney Failure <15          Imaging Results (Last 24 Hours)     Procedure Component Value Units Date/Time    XR Chest 2 View [[id number]] Collected: 07/20/21 1308     Updated: 07/20/21 1336    Narrative:      Chest x-ray PA and lateral           CLINICAL INDICATION: Shortness of breath.     COMPARISON: Chest x-ray December 30, 2020. None    FINDINGS: Cardiac silhouette is enlarged in size. Pulmonary  vascularity is unremarkable.   Pacemaker, AICD lead with tip in right ventricle.  No focal infiltrate or consolidation.  No pleural effusion.  No  pneumothorax.  Minimal discoid fibrotic changes right lung base.    Impression:      Cardiomegaly and pacemaker, AICD lead with tip in  right ventricle. Minimal discoid fibrotic changes right lung  base. Otherwise unremarkable chest.    Electronically signed by:  Yamil Bennett MD  7/20/2021 1:35 PM CDT  Workstation: [id number]            Assessment:    Active Hospital Problems    Diagnosis    • Systolic congestive heart failure (CMS/[facility])              Plan:  -Patient will be admitted for acute on chronic systolic heart failure, his cardiologist Dr. Perez has been consulted already.  -We will continue his home medications as appropriate  -We will start him on Dobutrex infusion to help alleviate his fluid overload  -We will monitor I's and O's as well as place him on a fluid and sodium restriction  -Daily weights will be ordered as well  -DVT prophylaxis with Eliquis  -CODE STATUS: Full    I confirmed that the patient's Advance Care Plan is present, code status is documented, or surrogate decision maker is listed in the patient's medical record.     I have utilized all available immediate resources to obtain, update, or review the patient's current medications.     I  discussed the patient's findings and my recommendations with: The patient and cardiology.    Alejandro Brunson MD

## 2021-07-20 NOTE — ED NOTES
Reminded pt of need for urine specimen and to use call light when he can use the restroom due to fall risk.     Isabela Zepeda RN  07/20/21 2983

## 2021-07-21 LAB
ALBUMIN SERPL-MCNC: 3.8 G/DL (ref 3.5–5.2)
ALBUMIN/GLOB SERPL: 1.4 G/DL
ALP SERPL-CCNC: 110 U/L (ref 39–117)
ALT SERPL W P-5'-P-CCNC: 19 U/L (ref 1–41)
ANION GAP SERPL CALCULATED.3IONS-SCNC: 10 MMOL/L (ref 5–15)
AST SERPL-CCNC: 27 U/L (ref 1–40)
BASOPHILS # BLD AUTO: 0.04 10*3/MM3 (ref 0–0.2)
BASOPHILS NFR BLD AUTO: 1 % (ref 0–1.5)
BILIRUB SERPL-MCNC: 1.4 MG/DL (ref 0–1.2)
BUN SERPL-MCNC: 20 MG/DL (ref 6–20)
BUN/CREAT SERPL: 21.3 (ref 7–25)
CALCIUM SPEC-SCNC: 9 MG/DL (ref 8.6–10.5)
CHLORIDE SERPL-SCNC: 97 MMOL/L (ref 98–107)
CO2 SERPL-SCNC: 27 MMOL/L (ref 22–29)
CREAT SERPL-MCNC: 0.94 MG/DL (ref 0.76–1.27)
DEPRECATED RDW RBC AUTO: 58.1 FL (ref 37–54)
EOSINOPHIL # BLD AUTO: 0.05 10*3/MM3 (ref 0–0.4)
EOSINOPHIL NFR BLD AUTO: 1.3 % (ref 0.3–6.2)
ERYTHROCYTE [DISTWIDTH] IN BLOOD BY AUTOMATED COUNT: 18.4 % (ref 12.3–15.4)
GFR SERPL CREATININE-BSD FRML MDRD: 89 ML/MIN/1.73
GLOBULIN UR ELPH-MCNC: 2.8 GM/DL
GLUCOSE SERPL-MCNC: 112 MG/DL (ref 65–99)
HCT VFR BLD AUTO: 37.2 % (ref 37.5–51)
HGB BLD-MCNC: 11.8 G/DL (ref 13–17.7)
IMM GRANULOCYTES # BLD AUTO: 0 10*3/MM3 (ref 0–0.05)
IMM GRANULOCYTES NFR BLD AUTO: 0 % (ref 0–0.5)
LYMPHOCYTES # BLD AUTO: 1.19 10*3/MM3 (ref 0.7–3.1)
LYMPHOCYTES NFR BLD AUTO: 30.2 % (ref 19.6–45.3)
MCH RBC QN AUTO: 27.4 PG (ref 26.6–33)
MCHC RBC AUTO-ENTMCNC: 31.7 G/DL (ref 31.5–35.7)
MCV RBC AUTO: 86.5 FL (ref 79–97)
MONOCYTES # BLD AUTO: 0.49 10*3/MM3 (ref 0.1–0.9)
MONOCYTES NFR BLD AUTO: 12.4 % (ref 5–12)
NEUTROPHILS NFR BLD AUTO: 2.17 10*3/MM3 (ref 1.7–7)
NEUTROPHILS NFR BLD AUTO: 55.1 % (ref 42.7–76)
NRBC BLD AUTO-RTO: 0 /100 WBC (ref 0–0.2)
PLATELET # BLD AUTO: 169 10*3/MM3 (ref 140–450)
PMV BLD AUTO: 10.2 FL (ref 6–12)
POTASSIUM SERPL-SCNC: 3.8 MMOL/L (ref 3.5–5.2)
PROT SERPL-MCNC: 6.6 G/DL (ref 6–8.5)
RBC # BLD AUTO: 4.3 10*6/MM3 (ref 4.14–5.8)
SODIUM SERPL-SCNC: 134 MMOL/L (ref 136–145)
WBC # BLD AUTO: 3.94 10*3/MM3 (ref 3.4–10.8)

## 2021-07-21 PROCEDURE — 25010000002 DOBUTAMINE PER 250 MG: Performed by: FAMILY MEDICINE

## 2021-07-21 PROCEDURE — 85025 COMPLETE CBC W/AUTO DIFF WBC: CPT | Performed by: FAMILY MEDICINE

## 2021-07-21 PROCEDURE — 80053 COMPREHEN METABOLIC PANEL: CPT | Performed by: FAMILY MEDICINE

## 2021-07-21 RX ORDER — SODIUM CHLORIDE 9 MG/ML
INJECTION, SOLUTION INTRAVENOUS
Status: COMPLETED
Start: 2021-07-21 | End: 2021-07-21

## 2021-07-21 RX ADMIN — APIXABAN 5 MG: 5 TABLET, FILM COATED ORAL at 20:17

## 2021-07-21 RX ADMIN — SACUBITRIL AND VALSARTAN 0.5 TABLET: 24; 26 TABLET, FILM COATED ORAL at 20:17

## 2021-07-21 RX ADMIN — PANTOPRAZOLE SODIUM 40 MG: 40 TABLET, DELAYED RELEASE ORAL at 06:47

## 2021-07-21 RX ADMIN — APIXABAN 5 MG: 5 TABLET, FILM COATED ORAL at 08:05

## 2021-07-21 RX ADMIN — SACUBITRIL AND VALSARTAN 1 TABLET: 24; 26 TABLET, FILM COATED ORAL at 08:05

## 2021-07-21 RX ADMIN — FUROSEMIDE 80 MG: 80 TABLET ORAL at 06:47

## 2021-07-21 RX ADMIN — SODIUM CHLORIDE, PRESERVATIVE FREE 10 ML: 5 INJECTION INTRAVENOUS at 08:05

## 2021-07-21 RX ADMIN — DOBUTAMINE HYDROCHLORIDE 8 MCG/KG/MIN: 100 INJECTION INTRAVENOUS at 01:45

## 2021-07-21 RX ADMIN — Medication 400 MG: at 08:05

## 2021-07-21 RX ADMIN — DOBUTAMINE HYDROCHLORIDE 8 MCG/KG/MIN: 100 INJECTION INTRAVENOUS at 10:17

## 2021-07-21 RX ADMIN — DIGOXIN 250 MCG: 250 TABLET ORAL at 11:31

## 2021-07-21 RX ADMIN — Medication 12.5 MG: at 08:05

## 2021-07-21 RX ADMIN — Medication 400 MG: at 20:17

## 2021-07-21 RX ADMIN — SODIUM CHLORIDE 250 ML: 9 INJECTION, SOLUTION INTRAVENOUS at 16:34

## 2021-07-21 RX ADMIN — SODIUM CHLORIDE, PRESERVATIVE FREE 10 ML: 5 INJECTION INTRAVENOUS at 20:20

## 2021-07-21 NOTE — NURSING NOTE
Hospitalist paged to be notified that MD Chris not returning calls, and indicating next step to take.

## 2021-07-21 NOTE — PAYOR COMM NOTE
"Melia Orellanamore  Case Management Extender  [facility]  [phone] phone  [phone] fax    Sanya Dahl (41 y.o. Male)     Date of Birth Social Security Number Address Home Phone MRN    1980  [address]   [place] [address] [phone] [id number]    Judaism Marital Status          [place] Single       Admission Date Admission Type Admitting Provider Attending Provider Department, Room/Bed    7/20/21 Emergency Alejandro Brunson MD Craig, David A, MD [address], 327/1    Discharge Date Discharge Disposition Discharge Destination                       Attending Provider: Alejandro Brunson MD    Allergies: Chantix [Varenicline]    Isolation: None   Infection: None   Code Status: CPR    Ht: 180.3 cm (71\")   Wt: 82.5 kg (181 lb 12.8 oz)    Admission Cmt: None   Principal Problem: None                Active Insurance as of 7/20/2021     Primary Coverage     Payor Plan Insurance Group Employer/Plan Group    [employer] [place] [employer] [id number]     Payor Plan Address Payor Plan Phone Number Payor Plan Fax Number Effective Dates    [address] [address] [phone]  10/28/2017 - None Entered    [facility] [id number]       Subscriber Name Subscriber Birth Date Member ID       SANYA DAHL 1980 [id number]                 Emergency Contacts      (Rel.) Home Phone Work Phone Mobile Phone    doug holland (Father) [phone] -- [phone]               History & Physical      Alejandro Brunson MD at 07/20/21 [facility]                [facility] Medicine Admission      Date of Admission: 7/20/2021      Primary Care Physician: Joslyn Rousseau APRN      Chief Complaint: Shortness of breath/chest discomfort    HPI:Patient is a 41 year old male with medical hx notable for Systolic CHF, GERD, HTN, and hx of PE who presented to the ED due to worsening chest discomfort, shortness of breath, and weight gain for the " past one week.  He notes he has been to  and evaluated for heart transplant in the past and follows with Dr. Perez here.  He denies any fever or chills but notes issues recently with cough.  He states that he has been taking his diuretics as prescribed and attempts to follow a fluid restriction at home.  He denies any vomiting but notes occasional nausea.  He also reports some left arm discomfort.     Evaluation in the ED was notable for CBC which showed Hgb of 12.2 but was otherwise unremarkable.  Digoxin level and troponin T were both normal.  ProBNP was elevated at 8,369.  CMP showed glucose of 102, BUN 24, sodium 135, and total bilirubin 2.0.  PTT was normal.  PT/INR was 16.8 and 1.39.  UA was negative.  UDS showed Amphetamines but was otherwise unremarkable.  COVID and flu screening was normal.      Concurrent Medical History:  has a past medical history of Anxiety, Callus, Clotting disorder (CMS/HCC), Elevated liver enzymes, GERD (gastroesophageal reflux disease), Heart trouble, Hypertension, PE (pulmonary thromboembolism) (CMS/HCC), and Pneumonia.    Past Surgical History:  has a past surgical history that includes Vascular surgery and Cardiac electrophysiology procedure (N/A, 7/28/2020).    Family History: family history includes Allergies in an other family member; Anxiety disorder in an other family member; Diabetes in an other family member; Heart disease in an other family member; Hypertension in an other family member. No changes    Social History:  reports that he quit smoking about 16 months ago. His smoking use included cigarettes. He has a 5.00 pack-year smoking history. He has never used smokeless tobacco. He reports current alcohol use. He reports that he does not use drugs.    Allergies:   Allergies   Allergen Reactions   • Chantix [Varenicline] Anaphylaxis       Medications:   Prior to Admission medications    Medication Sig Start Date End Date Taking? Authorizing Provider   albuterol  sulfate  (90 Base) MCG/ACT inhaler Inhale 2 puffs Every 4 (Four) Hours As Needed for Wheezing. 12/4/19   Naun Herbert MD   apixaban (ELIQUIS) 5 MG tablet tablet Take 5 mg by mouth.    Lena Cabrera MD   bumetanide (BUMEX) 1 MG tablet  5/21/21   Lena Cabrera MD   Calcium Carbonate Antacid 1250 MG/5ML Take  by mouth.    Lena Cabrera MD   digoxin (LANOXIN) 250 MCG tablet Take 1 tablet by mouth Daily. 2/9/20   Ricardo Grubbs MD   furosemide (LASIX) 80 MG tablet Take 1 tablet by mouth 2 (Two) Times a Day. 5/14/21   Lena Cabrera MD   lidocaine-prilocaine (EMLA) 2.5-2.5 % cream As Needed.    Lena Cabrera MD   lisdexamfetamine (VYVANSE) 70 MG capsule Take 70 mg by mouth Every Morning Pt takes 1/2 in the morning and 1/2 in the afternoon     Lena Cabrera MD   Magnesium Oxide 400 (240 Mg) MG tablet Take 1 tablet by mouth 2 (Two) Times a Day. 2/8/20   Ricardo Grubbs MD   MAGnesium-Oxide 400 (241.3 Mg) MG tablet tablet Take 400 mg by mouth 2 (Two) Times a Day. 4/21/21   Lena Cabrera MD   metoprolol succinate XL (TOPROL-XL) 25 MG 24 hr tablet  7/13/21   Lena Cabrera MD   metoprolol tartrate (LOPRESSOR) 25 MG tablet Take 0.5 tablets by mouth Every 12 (Twelve) Hours. 2/8/20   Ricardo Grubbs MD   pantoprazole (Protonix) 40 MG EC tablet Take 1 tablet by mouth Daily. 10/12/20   Alejandro Hughes PA-C   sacubitril-valsartan (ENTRESTO) 24-26 MG tablet Take 1 tablet by mouth Every 12 (Twelve) Hours. 2/8/20   Ricardo Grubbs MD   spironolactone (ALDACTONE) 25 MG tablet Take 0.5 tablets by mouth Daily. 2/9/20   Ricardo Grubbs MD   vitamin D (ERGOCALCIFEROL) 1.25 MG (98383 UT) capsule capsule Take 50,000 Units by mouth Every 7 (Seven) Days.    Provider, MD Lena   zolpidem (AMBIEN) 10 MG tablet Take 10 mg by mouth At Night As Needed for Sleep.    Provider, MD Lena       Review of Systems:  Review of Systems    Constitutional: Positive for unexpected weight change. Negative for chills and fever.   HENT: Negative for congestion.    Respiratory: Positive for cough and shortness of breath. Negative for wheezing.    Cardiovascular: Positive for chest pain. Negative for palpitations and leg swelling.   Gastrointestinal: Positive for abdominal distention. Negative for abdominal pain, constipation, diarrhea, nausea and vomiting.   Genitourinary: Negative.    Musculoskeletal: Negative.    Skin: Negative.    Neurological: Negative.    Psychiatric/Behavioral: Negative.    All other systems reviewed and are negative.     Otherwise complete ROS is negative except as mentioned above.    Physical Exam:   Temp:  [98 °F (36.7 °C)] 98 °F (36.7 °C)  Heart Rate:  [78-89] 82  Resp:  [18-20] 20  BP: (107-116)/(69-84) 111/79  Physical Exam  Constitutional:       General: He is not in acute distress.     Appearance: He is not toxic-appearing.   HENT:      Head: Normocephalic and atraumatic.      Right Ear: External ear normal.      Left Ear: External ear normal.      Nose: Nose normal.      Mouth/Throat:      Mouth: Mucous membranes are moist.      Pharynx: Oropharynx is clear.   Eyes:      Conjunctiva/sclera: Conjunctivae normal.   Cardiovascular:      Rate and Rhythm: Normal rate and regular rhythm.      Pulses: Normal pulses.      Heart sounds: Normal heart sounds.   Pulmonary:      Effort: Pulmonary effort is normal. No respiratory distress.      Breath sounds: Normal breath sounds.   Abdominal:      General: Bowel sounds are normal.      Palpations: Abdomen is soft.      Tenderness: There is no abdominal tenderness.   Musculoskeletal:         General: No swelling.      Cervical back: Neck supple.   Skin:     General: Skin is warm and dry.      Capillary Refill: Capillary refill takes less than 2 seconds.   Neurological:      General: No focal deficit present.      Mental Status: He is alert and oriented to person, place, and time. Mental  status is at baseline.      Coordination: Coordination normal.   Psychiatric:         Mood and Affect: Mood normal.         Behavior: Behavior normal.           Results Reviewed:  I have personally reviewed current lab, radiology, and data and agree with results.  Lab Results (last 24 hours)     Procedure Component Value Units Date/Time    COVID-19 and FLU A/B PCR - Swab, Nasopharynx [[id number]]  (Normal) Collected: 07/20/21 1509    Specimen: Swab from Nasopharynx Updated: 07/20/21 1544     COVID19 Not Detected     Influenza A PCR Not Detected     Influenza B PCR Not Detected    Narrative:      Fact sheet for providers: https://www.fda.gov/media/172870/download    Fact sheet for patients: https://www.fda.gov/media/423725/download    Test performed by PCR.    Urinalysis With Microscopic If Indicated (No Culture) - Urine, Clean Catch [[id number]]  (Abnormal) Collected: 07/20/21 1441    Specimen: Urine, Clean Catch Updated: 07/20/21 1459     Color, UA Dark Yellow     Appearance, UA Clear     pH, UA 6.0     Specific Gravity, UA 1.020     Glucose, UA Negative     Ketones, UA Negative     Bilirubin, UA Small (1+)     Blood, UA Negative     Protein, UA Negative     Leuk Esterase, UA Negative     Nitrite, UA Negative     Urobilinogen, UA 1.0 E.U./dL    Narrative:      Urine microscopic not indicated.    Urine Drug Screen - Urine, Clean Catch [[id number]]  (Abnormal) Collected: 07/20/21 1441    Specimen: Urine, Clean Catch Updated: 07/20/21 1458     THC, Screen, Urine Negative     Phencyclidine (PCP), Urine Negative     Cocaine Screen, Urine Negative     Methamphetamine, Ur Negative     Opiate Screen Negative     Amphetamine Screen, Urine Positive     Benzodiazepine Screen, Urine Negative     Tricyclic Antidepressants Screen Negative     Methadone Screen, Urine Negative     Barbiturates Screen, Urine Negative     Oxycodone Screen, Urine Negative     Propoxyphene Screen Negative     Buprenorphine, Screen, Urine Negative     Narrative:      Cutoff For Drugs Screened:    Amphetamines               500 ng/ml  Barbiturates               200 ng/ml  Benzodiazepines            150 ng/ml  Cocaine                    150 ng/ml  Methadone                  200 ng/ml  Opiates                    100 ng/ml  Phencyclidine               25 ng/ml  THC                            50 ng/ml  Methamphetamine            500 ng/ml  Tricyclic Antidepressants  300 ng/ml  Oxycodone                  100 ng/ml  Propoxyphene               300 ng/ml  Buprenorphine               10 ng/ml    The normal value for all drugs tested is negative. This report includes unconfirmed screening results, with the cutoff values listed, to be used for medical treatment purposes only.  Unconfirmed results must not be used for non-medical purposes such as employment or legal testing.  Clinical consideration should be applied to any drug of abuse test, particularly when unconfirmed results are used.      [place] Draw [[id number]] Collected: 07/20/21 1233    Specimen: Blood Updated: 07/20/21 1345    Narrative:      The following orders were created for panel order [place] Draw.  Procedure                               Abnormality         Status                     ---------                               -----------         ------                     Green Top (Gel)[[id number]]                                  Final result               Lavender Top[[id number]]                                     Final result               Gold Top - SST[[id number]]                                   Final result                 Please view results for these tests on the individual orders.    Green Top (Gel) [[id number]] Collected: 07/20/21 1233    Specimen: Blood Updated: 07/20/21 1345     Extra Tube Hold for add-ons.     Comment: Auto resulted.       Lavender Top [[id number]] Collected: 07/20/21 1233    Specimen: Blood Updated: 07/20/21 1345     Extra Tube hold for add-on     Comment: Auto resulted       Gold  Top - SST [[id number]] Collected: 07/20/21 1233    Specimen: Blood Updated: 07/20/21 1345     Extra Tube Hold for add-ons.     Comment: Auto resulted.       Troponin [[id number]]  (Normal) Collected: 07/20/21 1233    Specimen: Blood Updated: 07/20/21 1342     Troponin T 0.013 ng/mL     Narrative:      Troponin T Reference Range:  <= 0.03 ng/mL-   Negative for AMI  >0.03 ng/mL-     Abnormal for myocardial necrosis.  Clinicians would have to utilize clinical acumen, EKG, Troponin and serial changes to determine if it is an Acute Myocardial Infarction or myocardial injury due to an underlying chronic condition.       Results may be falsely decreased if patient taking Biotin.      aPTT [[id number]]  (Normal) Collected: 07/20/21 1306    Specimen: Blood Updated: 07/20/21 1333     PTT 31.5 seconds     Narrative:      The recommended Heparin therapeutic range is 68-97 seconds.    Protime-INR [[id number]]  (Abnormal) Collected: 07/20/21 1306    Specimen: Blood Updated: 07/20/21 1333     Protime 16.8 Seconds      INR 1.39    Narrative:      Therapeutic range for most indications is 2.0-3.0 INR,  or 2.5-3.5 for mechanical heart valves.    Digoxin Level [[id number]]  (Normal) Collected: 07/20/21 1233    Specimen: Blood Updated: 07/20/21 1331     Digoxin 0.90 ng/mL     BNP [[id number]]  (Abnormal) Collected: 07/20/21 1233    Specimen: Blood Updated: 07/20/21 1330     proBNP 8,369.0 pg/mL     Narrative:      Among patients with dyspnea, NT-proBNP is highly sensitive for the detection of acute congestive heart failure. In addition NT-proBNP of <300 pg/ml effectively rules out acute congestive heart failure with 99% negative predictive value.    Results may be falsely decreased if patient taking Biotin.      CBC & Differential [[id number]]  (Abnormal) Collected: 07/20/21 1233    Specimen: Blood Updated: 07/20/21 1318    Narrative:      The following orders were created for panel order CBC & Differential.  Procedure                                Abnormality         Status                     ---------                               -----------         ------                     CBC Auto Differential[[id number]]        Abnormal            Final result                 Please view results for these tests on the individual orders.    CBC Auto Differential [[id number]]  (Abnormal) Collected: 07/20/21 1233    Specimen: Blood Updated: 07/20/21 1318     WBC 4.83 10*3/mm3      RBC 4.44 10*6/mm3      Hemoglobin 12.2 g/dL      Hematocrit 39.0 %      MCV 87.8 fL      MCH 27.5 pg      MCHC 31.3 g/dL      RDW 18.4 %      RDW-SD 58.7 fl      MPV 10.4 fL      Platelets 206 10*3/mm3      Neutrophil % 59.1 %      Lymphocyte % 27.1 %      Monocyte % 11.6 %      Eosinophil % 1.0 %      Basophil % 1.0 %      Immature Grans % 0.2 %      Neutrophils, Absolute 2.85 10*3/mm3      Lymphocytes, Absolute 1.31 10*3/mm3      Monocytes, Absolute 0.56 10*3/mm3      Eosinophils, Absolute 0.05 10*3/mm3      Basophils, Absolute 0.05 10*3/mm3      Immature Grans, Absolute 0.01 10*3/mm3      nRBC 0.0 /100 WBC     Comprehensive Metabolic Panel [[id number]]  (Abnormal) Collected: 07/20/21 1233    Specimen: Blood Updated: 07/20/21 1254     Glucose 102 mg/dL      BUN 24 mg/dL      Creatinine 1.09 mg/dL      Sodium 135 mmol/L      Potassium 4.2 mmol/L      Chloride 98 mmol/L      CO2 25.0 mmol/L      Calcium 9.4 mg/dL      Total Protein 6.9 g/dL      Albumin 4.10 g/dL      ALT (SGPT) 21 U/L      AST (SGOT) 30 U/L      Alkaline Phosphatase 110 U/L      Total Bilirubin 2.0 mg/dL      eGFR Non African Amer 75 mL/min/1.73      Globulin 2.8 gm/dL      A/G Ratio 1.5 g/dL      BUN/Creatinine Ratio 22.0     Anion Gap 12.0 mmol/L     Narrative:      GFR Normal >60  Chronic Kidney Disease <60  Kidney Failure <15          Imaging Results (Last 24 Hours)     Procedure Component Value Units Date/Time    XR Chest 2 View [[id number]] Collected: 07/20/21 1308     Updated: 07/20/21 1336    Narrative:      Chest  x-ray PA and lateral           CLINICAL INDICATION: Shortness of breath.     COMPARISON: Chest x-ray December 30, 2020. None    FINDINGS: Cardiac silhouette is enlarged in size. Pulmonary  vascularity is unremarkable.   Pacemaker, AICD lead with tip in right ventricle.  No focal infiltrate or consolidation.  No pleural effusion.  No  pneumothorax.  Minimal discoid fibrotic changes right lung base.    Impression:      Cardiomegaly and pacemaker, AICD lead with tip in  right ventricle. Minimal discoid fibrotic changes right lung  base. Otherwise unremarkable chest.    Electronically signed by:  Yamil Bennett MD  7/20/2021 1:35 PM CDT  Workstation: [id number]            Assessment:    Active Hospital Problems    Diagnosis    • Systolic congestive heart failure (CMS/[facility])              Plan:  -Patient will be admitted for acute on chronic systolic heart failure, his cardiologist Dr. Perez has been consulted already.  -We will continue his home medications as appropriate  -We will start him on Dobutrex infusion to help alleviate his fluid overload  -We will monitor I's and O's as well as place him on a fluid and sodium restriction  -Daily weights will be ordered as well  -DVT prophylaxis with Eliquis  -CODE STATUS: Full    I confirmed that the patient's Advance Care Plan is present, code status is documented, or surrogate decision maker is listed in the patient's medical record.     I have utilized all available immediate resources to obtain, update, or review the patient's current medications.     I discussed the patient's findings and my recommendations with: The patient and cardiology.    Alejandro Brunson MD            Electronically signed by Alejandro Brunson MD at 07/20/21 1711          Emergency Department Notes      Amanda Levy RN at 07/20/21 1308        Pt unable to pee at this time.      Amanda Levy RN  07/20/21 1308      Electronically signed by Amanda Levy RN at 07/20/21 1308     Duane  HILL Mar at 07/20/21 1435        Reminded pt of need for urine specimen and to use call light when he can use the restroom due to fall risk.     Isabela Zepeda RN  07/20/21 1435      Electronically signed by Isabela Zepdea RN at 07/20/21 1435     Isabela Zepeda RN at 07/20/21 1441        Assisted pt to use urinal at bedside X2 assist. Pt gait steady     Isabela Zepeda RN  07/20/21 1452      Electronically signed by Isabela Zepeda RN at 07/20/21 1452     Isabela Zepeda RN at 07/20/21 1629        Called and gave report to 3W     Isabela Zepeda RN  07/20/21 1630      Electronically signed by Isabela Zepeda RN at 07/20/21 1630     Kalie Mcnulty RN at 07/20/21 1640        Telemetry box applied and verified.     Kalie Mcnulty RN  07/20/21 1640      Electronically signed by Kalie Mcnulty RN at 07/20/21 1640       Lab Results (last 48 hours)     Procedure Component Value Units Date/Time    Comprehensive Metabolic Panel [[id number]]  (Abnormal) Collected: 07/21/21 0705    Specimen: Blood Updated: 07/21/21 0729     Glucose 112 mg/dL      BUN 20 mg/dL      Creatinine 0.94 mg/dL      Sodium 134 mmol/L      Potassium 3.8 mmol/L      Chloride 97 mmol/L      CO2 27.0 mmol/L      Calcium 9.0 mg/dL      Total Protein 6.6 g/dL      Albumin 3.80 g/dL      ALT (SGPT) 19 U/L      AST (SGOT) 27 U/L      Alkaline Phosphatase 110 U/L      Total Bilirubin 1.4 mg/dL      eGFR Non African Amer 89 mL/min/1.73      Globulin 2.8 gm/dL      A/G Ratio 1.4 g/dL      BUN/Creatinine Ratio 21.3     Anion Gap 10.0 mmol/L     Narrative:      GFR Normal >60  Chronic Kidney Disease <60  Kidney Failure <15      CBC & Differential [[id number]]  (Abnormal) Collected: 07/21/21 0705    Specimen: Blood Updated: 07/21/21 0711    Narrative:      The following orders were created for panel order CBC & Differential.  Procedure                               Abnormality         Status                     ---------                                -----------         ------                     CBC Auto Differential[[id number]]        Abnormal            Final result                 Please view results for these tests on the individual orders.    CBC Auto Differential [[id number]]  (Abnormal) Collected: 07/21/21 0705    Specimen: Blood Updated: 07/21/21 0711     WBC 3.94 10*3/mm3      RBC 4.30 10*6/mm3      Hemoglobin 11.8 g/dL      Hematocrit 37.2 %      MCV 86.5 fL      MCH 27.4 pg      MCHC 31.7 g/dL      RDW 18.4 %      RDW-SD 58.1 fl      MPV 10.2 fL      Platelets 169 10*3/mm3      Neutrophil % 55.1 %      Lymphocyte % 30.2 %      Monocyte % 12.4 %      Eosinophil % 1.3 %      Basophil % 1.0 %      Immature Grans % 0.0 %      Neutrophils, Absolute 2.17 10*3/mm3      Lymphocytes, Absolute 1.19 10*3/mm3      Monocytes, Absolute 0.49 10*3/mm3      Eosinophils, Absolute 0.05 10*3/mm3      Basophils, Absolute 0.04 10*3/mm3      Immature Grans, Absolute 0.00 10*3/mm3      nRBC 0.0 /100 WBC     Troponin [[id number]]  (Normal) Collected: 07/20/21 1625    Specimen: Blood Updated: 07/20/21 1647     Troponin T 0.010 ng/mL     Narrative:      Troponin T Reference Range:  <= 0.03 ng/mL-   Negative for AMI  >0.03 ng/mL-     Abnormal for myocardial necrosis.  Clinicians would have to utilize clinical acumen, EKG, Troponin and serial changes to determine if it is an Acute Myocardial Infarction or myocardial injury due to an underlying chronic condition.       Results may be falsely decreased if patient taking Biotin.      COVID-19 and FLU A/B PCR - Swab, Nasopharynx [[id number]]  (Normal) Collected: 07/20/21 1509    Specimen: Swab from Nasopharynx Updated: 07/20/21 1544     COVID19 Not Detected     Influenza A PCR Not Detected     Influenza B PCR Not Detected    Narrative:      Fact sheet for providers: https://www.fda.gov/media/265652/download    Fact sheet for patients: https://www.fda.gov/media/192287/download    Test performed by PCR.    Urinalysis With  Microscopic If Indicated (No Culture) - Urine, Clean Catch [[id number]]  (Abnormal) Collected: 07/20/21 1441    Specimen: Urine, Clean Catch Updated: 07/20/21 1459     Color, UA Dark Yellow     Appearance, UA Clear     pH, UA 6.0     Specific Gravity, UA 1.020     Glucose, UA Negative     Ketones, UA Negative     Bilirubin, UA Small (1+)     Blood, UA Negative     Protein, UA Negative     Leuk Esterase, UA Negative     Nitrite, UA Negative     Urobilinogen, UA 1.0 E.U./dL    Narrative:      Urine microscopic not indicated.    Urine Drug Screen - Urine, Clean Catch [[id number]]  (Abnormal) Collected: 07/20/21 1441    Specimen: Urine, Clean Catch Updated: 07/20/21 1458     THC, Screen, Urine Negative     Phencyclidine (PCP), Urine Negative     Cocaine Screen, Urine Negative     Methamphetamine, Ur Negative     Opiate Screen Negative     Amphetamine Screen, Urine Positive     Benzodiazepine Screen, Urine Negative     Tricyclic Antidepressants Screen Negative     Methadone Screen, Urine Negative     Barbiturates Screen, Urine Negative     Oxycodone Screen, Urine Negative     Propoxyphene Screen Negative     Buprenorphine, Screen, Urine Negative    Narrative:      Cutoff For Drugs Screened:    Amphetamines               500 ng/ml  Barbiturates               200 ng/ml  Benzodiazepines            150 ng/ml  Cocaine                    150 ng/ml  Methadone                  200 ng/ml  Opiates                    100 ng/ml  Phencyclidine               25 ng/ml  THC                            50 ng/ml  Methamphetamine            500 ng/ml  Tricyclic Antidepressants  300 ng/ml  Oxycodone                  100 ng/ml  Propoxyphene               300 ng/ml  Buprenorphine               10 ng/ml    The normal value for all drugs tested is negative. This report includes unconfirmed screening results, with the cutoff values listed, to be used for medical treatment purposes only.  Unconfirmed results must not be used for non-medical purposes  such as employment or legal testing.  Clinical consideration should be applied to any drug of abuse test, particularly when unconfirmed results are used.      [place] Draw [[id number]] Collected: 07/20/21 1233    Specimen: Blood Updated: 07/20/21 1345    Narrative:      The following orders were created for panel order [place] Draw.  Procedure                               Abnormality         Status                     ---------                               -----------         ------                     Green Top (Gel)[[id number]]                                  Final result               Lavender Top[[id number]]                                     Final result               Gold Top - SST[[id number]]                                   Final result                 Please view results for these tests on the individual orders.    Green Top (Gel) [[id number]] Collected: 07/20/21 1233    Specimen: Blood Updated: 07/20/21 1345     Extra Tube Hold for add-ons.     Comment: Auto resulted.       Lavender Top [[id number]] Collected: 07/20/21 1233    Specimen: Blood Updated: 07/20/21 1345     Extra Tube hold for add-on     Comment: Auto resulted       Gold Top - SST [[id number]] Collected: 07/20/21 1233    Specimen: Blood Updated: 07/20/21 1345     Extra Tube Hold for add-ons.     Comment: Auto resulted.       Troponin [[id number]]  (Normal) Collected: 07/20/21 1233    Specimen: Blood Updated: 07/20/21 1342     Troponin T 0.013 ng/mL     Narrative:      Troponin T Reference Range:  <= 0.03 ng/mL-   Negative for AMI  >0.03 ng/mL-     Abnormal for myocardial necrosis.  Clinicians would have to utilize clinical acumen, EKG, Troponin and serial changes to determine if it is an Acute Myocardial Infarction or myocardial injury due to an underlying chronic condition.       Results may be falsely decreased if patient taking Biotin.      aPTT [[id number]]  (Normal) Collected: 07/20/21 1306    Specimen: Blood Updated: 07/20/21 1333     PTT  31.5 seconds     Narrative:      The recommended Heparin therapeutic range is 68-97 seconds.    Protime-INR [[id number]]  (Abnormal) Collected: 07/20/21 1306    Specimen: Blood Updated: 07/20/21 1333     Protime 16.8 Seconds      INR 1.39    Narrative:      Therapeutic range for most indications is 2.0-3.0 INR,  or 2.5-3.5 for mechanical heart valves.    Digoxin Level [[id number]]  (Normal) Collected: 07/20/21 1233    Specimen: Blood Updated: 07/20/21 1331     Digoxin 0.90 ng/mL     BNP [[id number]]  (Abnormal) Collected: 07/20/21 1233    Specimen: Blood Updated: 07/20/21 1330     proBNP 8,369.0 pg/mL     Narrative:      Among patients with dyspnea, NT-proBNP is highly sensitive for the detection of acute congestive heart failure. In addition NT-proBNP of <300 pg/ml effectively rules out acute congestive heart failure with 99% negative predictive value.    Results may be falsely decreased if patient taking Biotin.      CBC & Differential [[id number]]  (Abnormal) Collected: 07/20/21 1233    Specimen: Blood Updated: 07/20/21 1318    Narrative:      The following orders were created for panel order CBC & Differential.  Procedure                               Abnormality         Status                     ---------                               -----------         ------                     CBC Auto Differential[[id number]]        Abnormal            Final result                 Please view results for these tests on the individual orders.    CBC Auto Differential [[id number]]  (Abnormal) Collected: 07/20/21 1233    Specimen: Blood Updated: 07/20/21 1318     WBC 4.83 10*3/mm3      RBC 4.44 10*6/mm3      Hemoglobin 12.2 g/dL      Hematocrit 39.0 %      MCV 87.8 fL      MCH 27.5 pg      MCHC 31.3 g/dL      RDW 18.4 %      RDW-SD 58.7 fl      MPV 10.4 fL      Platelets 206 10*3/mm3      Neutrophil % 59.1 %      Lymphocyte % 27.1 %      Monocyte % 11.6 %      Eosinophil % 1.0 %      Basophil % 1.0 %      Immature Grans % 0.2 %       Neutrophils, Absolute 2.85 10*3/mm3      Lymphocytes, Absolute 1.31 10*3/mm3      Monocytes, Absolute 0.56 10*3/mm3      Eosinophils, Absolute 0.05 10*3/mm3      Basophils, Absolute 0.05 10*3/mm3      Immature Grans, Absolute 0.01 10*3/mm3      nRBC 0.0 /100 WBC     Comprehensive Metabolic Panel [[id number]]  (Abnormal) Collected: 07/20/21 1233    Specimen: Blood Updated: 07/20/21 1254     Glucose 102 mg/dL      BUN 24 mg/dL      Creatinine 1.09 mg/dL      Sodium 135 mmol/L      Potassium 4.2 mmol/L      Chloride 98 mmol/L      CO2 25.0 mmol/L      Calcium 9.4 mg/dL      Total Protein 6.9 g/dL      Albumin 4.10 g/dL      ALT (SGPT) 21 U/L      AST (SGOT) 30 U/L      Alkaline Phosphatase 110 U/L      Total Bilirubin 2.0 mg/dL      eGFR Non African Amer 75 mL/min/1.73      Globulin 2.8 gm/dL      A/G Ratio 1.5 g/dL      BUN/Creatinine Ratio 22.0     Anion Gap 12.0 mmol/L     Narrative:      GFR Normal >60  Chronic Kidney Disease <60  Kidney Failure <15            Imaging Results (Last 48 Hours)     Procedure Component Value Units Date/Time    XR Chest 2 View [[id number]] Collected: 07/20/21 1308     Updated: 07/20/21 1336    Narrative:      Chest x-ray PA and lateral           CLINICAL INDICATION: Shortness of breath.     COMPARISON: Chest x-ray December 30, 2020. None    FINDINGS: Cardiac silhouette is enlarged in size. Pulmonary  vascularity is unremarkable.   Pacemaker, AICD lead with tip in right ventricle.  No focal infiltrate or consolidation.  No pleural effusion.  No  pneumothorax.  Minimal discoid fibrotic changes right lung base.    Impression:      Cardiomegaly and pacemaker, AICD lead with tip in  right ventricle. Minimal discoid fibrotic changes right lung  base. Otherwise unremarkable chest.    Electronically signed by:  Yamil Bennett MD  7/20/2021 1:35 PM CDT  Workstation: [id number]        ECG/EMG Results (last 48 hours)     Procedure Component Value Units Date/Time    ECG 12 Lead [[id number]]  Collected: 21 1221     Updated: 21 1336     QT Interval 352 ms      QTC Interval 413 ms     Narrative:      Test Reason : SOA  Blood Pressure :   */*   mmHG  Vent. Rate :  83 BPM     Atrial Rate :  83 BPM     P-R Int : 180 ms          QRS Dur : 116 ms      QT Int : 352 ms       P-R-T Axes :  40 -38 114 degrees     QTc Int : 413 ms    Normal sinus rhythm  Nonspecific intraventricular conduction delay  Possible Left atrial enlargement  Left axis deviation  Left ventricular hypertrophy with QRS widening and repolarization abnormality  Cannot rule out Septal infarct , age undetermined  Abnormal ECG  When compared with ECG of 30-DEC-2020 20:15,  Minimal criteria for Septal infarct are now Present    Referred By: ERDR           Confirmed By: REENA RYAN    SCANNED EKG [[id number]] Resulted: 21     Updated: 21 1925          Physician Progress Notes (last 48 hours) (Notes from 21 1118 through 21 1118)    No notes of this type exist for this encounter.          Consult Notes (last 48 hours) (Notes from 21 1118 through 21 1118)      Owen Perez MD at 21 1823      Consult Orders    1. Inpatient Cardiology Consult [[id number]] ordered by Alejandro Brunson MD at 21 1617               Cardiology Consultation Note.        Patient Name: Didier Dahl  Age/Sex: 41 y.o. male  : 1980  MRN: [id number]    Date of consultation: 2021  Consulting Physician: Owen Perez MD  Primary care Physician: Joslyn Rousseau, APRN  Requesting Physician:   Alejandro Brunson MD  Reason for consultation: Shortness of breath      Subjective:       Chief Complaint: Shortness of breath and chest pain    History of Present Illness:  Didier Dahl is a 41 y.o. male     Body mass index is 25.05 kg/m².  With a past medical history significant for nonischemic cardiomyopathy with severe left ventricular systolic dysfunction with an ejection fraction of 10 to 15% s/p   single-chamber VVI [employer] model number [id number] and serial number [id number] July 2020, coronary angiogram done in December 2019 which had not reveal of any evidence of any obstructive epicardial coronary artery disease, CTA of the chest done at [facility] which had not reveal of any evidence of any pulmonary embolism with a right heart catheterization which had revealed elevated pulmonary capillary wedge pressure, arterial hypertension, left ventricular thrombus on anticoagulation with Eliquis, moderate tricuspid regurgitation and mild to moderate mitral regurgitation, atrial septal defect with right-to-left shunting with positive bubble study on chronic anticoagulation with Eliquis, attention deficit hyperactivity disorder, history of scoliosis, gastroesophageal reflux disease, history of asthmatic bronchitis, evaluation at [facility] for cardiac transplant in [place].    Patient was evaluated at the cardiac transplant center.  Patient does have a follow-up appointment.    Patient presented to the hospital with symptoms of chest pain and shortness of breath.  Patient complains of having leg pain.  Patient questioning complains of having chest pain with left arm pain.  Patient has increasing shortness of breath.  Patient has been compliant with his medication.  Patient had not been compliant with his low-salt diet.    Patient denies any delivery of shock.    Patient denies any fever chill patient denies any hemoptysis hematuria bright red blood per rectum.    Patient denies any symptoms of lightheaded dizziness.    Patient 10 point review of system except for what stated in the history of present is negative.    Concurrent  Medical History:  1.  Chest pain shortness of breath.  2.  Nonischemic cardiomyopathy with an ejection fraction of 10 to 15%.  3.  S/p  single-chamber VVI [employer] model number [id number] and serial number [id number] July 2020.  4.  Coronary angiogram done at  [facility] in December 2019 which had not reveal of any evidence of any obstructive epicardial coronary artery disease.  5.  Left ventricular thrombus on chronic anticoagulation with Eliquis.  6.  Arterial hypertension.  7.  Hypertensive heart disease.  8.  Moderate tricuspid regurgitation and mild to moderate mitral regurgitation.  9.  Atrial septal defect with positive bubble study for right-to-left shunting.  10.  Evaluation for cardiac transplant at [facility] in [place]..  11.  Chronic back pain.  12.  Attention deficit hyperactivity disorder.  13.  History of asthmatic bronchitis.  14.  Gastroesophageal reflux disease.  15.  Prostate enlargement.  16.  History of scoliosis.  17.  Chronic anticoagulation with Eliquis  18.  Obstructive sleep apnea.  19.  Vitamin D deficiency.  20.  Anxiety  21.  Abnormal liver function tests        Past Surgical History:  1.  Left lung surgical intervention with removal of the nail.  2.  Varicose vein stripping by Dr. Barbosa.  3.  Coronary angiogram.  4.  Right heart catheterization.  5.  Electrophysiology study with subsequent single-chamber VVI [employer] model number [id number] and serial number [id number] July 2020.      Family History: Family history significant for congestive heart failure      Social History: Smokes up to half pack per day remote history of drug abuse denies any current drug or alcohol abuse         Cardiac Risk Factors:   1.  Male.  2.  Arterial hypertension.  3.  Tobacco abuse.    Allergies:  Allergies   Allergen Reactions   • Chantix [Varenicline] Anaphylaxis       Medication:  Medications Prior to Admission   Medication Sig Dispense Refill Last Dose   • apixaban (ELIQUIS) 5 MG tablet tablet Take 5 mg by mouth.   7/19/2021 at Unknown time   • Calcium Carbonate Antacid 1250 MG/5ML Take  by mouth.   7/19/2021 at Unknown time   • digoxin (LANOXIN) 250 MCG tablet Take 1 tablet by mouth Daily. 30 tablet 1 7/20/2021 at Unknown time    • furosemide (LASIX) 80 MG tablet Take 1 tablet by mouth 2 (Two) Times a Day.   7/20/2021 at Unknown time   • lisdexamfetamine (VYVANSE) 70 MG capsule Take 70 mg by mouth Every Morning Pt takes 1/2 in the morning and 1/2 in the afternoon    7/20/2021 at Unknown time   • Magnesium Oxide 400 (240 Mg) MG tablet Take 1 tablet by mouth 2 (Two) Times a Day. 60 tablet 1 7/20/2021 at Unknown time   • metoprolol tartrate (LOPRESSOR) 25 MG tablet Take 0.5 tablets by mouth Every 12 (Twelve) Hours. 15 tablet 1 Past Week at Unknown time   • pantoprazole (Protonix) 40 MG EC tablet Take 1 tablet by mouth Daily. 30 tablet 5 7/20/2021 at Unknown time   • sacubitril-valsartan (ENTRESTO) 24-26 MG tablet Take 1 tablet by mouth Every 12 (Twelve) Hours. 60 tablet 1 7/20/2021 at Unknown time   • vitamin D (ERGOCALCIFEROL) 1.25 MG (12724 UT) capsule capsule Take 50,000 Units by mouth Every 7 (Seven) Days.   Past Week at Unknown time   • zolpidem (AMBIEN) 10 MG tablet Take 10 mg by mouth At Night As Needed for Sleep.   7/19/2021 at Unknown time   • albuterol sulfate  (90 Base) MCG/ACT inhaler Inhale 2 puffs Every 4 (Four) Hours As Needed for Wheezing. 1 inhaler 0    • bumetanide (BUMEX) 1 MG tablet    Unknown at Unknown time   • lidocaine-prilocaine (EMLA) 2.5-2.5 % cream As Needed.   Unknown at Unknown time   • MAGnesium-Oxide 400 (241.3 Mg) MG tablet tablet Take 400 mg by mouth 2 (Two) Times a Day.      • metoprolol succinate XL (TOPROL-XL) 25 MG 24 hr tablet       • spironolactone (ALDACTONE) 25 MG tablet Take 0.5 tablets by mouth Daily. 30 tablet 1 Unknown at Unknown time           Review of Systems:       Constitutional:  Denies recent weight loss, weight gain, fever or chills, no change in exercise tolerance.     HENT:  Denies any hearing loss, epistaxis, hoarseness, or difficulty speaking.     Eyes: Wears eyeglasses or contact lenses     Respiratory:  Denies dyspnea with exertion,no cough, wheezing, or hemoptysis.      Cardiovascular: Positive for chest pain and shortness of breath.  Negative for palpitations,  orthopnea, PND, peripheral edema, syncope, or claudication.     Gastrointestinal:  Denies change in bowel habits, dyspepsia, ulcer disease, hematochezia, or melena.  No nausea, no vomiting, no hematemesis, no diarrhea or constipation.    Endocrine: Negative for cold intolerance, heat intolerance, polydipsia, polyphagia or polyuria. Denies any history of weight change or unintended weight loss.    Genitourinary: Negative for hematuria.  No frequent urination or nocturia.      Musculoskeletal: Denies any history of arthritic symptoms or back problems .  No joint pain, joint stiffness, joint swelling, muscle pain, muscle weakness or neck pain.    Skin:  Denies any change in hair or nails, rashes, or skin lesions.     Allergic/Immunologic: Negative.  Negative for environmental allergies, food allergies or immunocompromised state.     Neurological:  Denies any history of recurrent headaches, strokes, TIA, or seizure disorder.     Hematological: Denies excessive bleeding, easy bruising, fatigue, lymphadenopathy or petechiae or any bleeding disorders.     Psychiatric/Behavioral: Denies any history of depression, substance abuse, or change in cognitive function. Denies any psychomotor reaction or tangential thought.  No depression, homicidal ideations or suicidal ideations.          Objective:     Objective:  Temp:  [97.6 °F (36.4 °C)-98 °F (36.7 °C)] 97.6 °F (36.4 °C)  Heart Rate:  [78-95] 89  Resp:  [18-20] 20  BP: (107-119)/(68-86) 112/71      Body mass index is 25.05 kg/m².           Physical Exam:   General Appearance:    Alert, oriented, cooperative, in no acute distress.   Head:    Normocephalic, atraumatic, without obvious abnormality.   Eyes:           SHRUTI.  Lids and lashes normal, conjunctivae and sclerae normal, no icterus, no pallor.   Ears:    Ears appear intact with no abnormalities noted.   Throat:   Mucous  membranes pink and moist.   Neck:   Supple, trachea midline, no carotid bruit, no organomegaly or JVD.   Lungs:     Clear to auscultation and percussion, respirations regular, even and unlabored. No wheezes, rales or rhonchi.    Heart:    Regular rhythm and normal rate, normal S1 and S2, no murmur, no gallop, no rub, no click.   Abdomen:     Soft, nontender, nondistended, no guarding, no rebound tenderness, normal bowel sounds in all four quadrants, no masses, liver and spleen nonpalpable.   Genitalia:    Deferred.   Extremities:   Moves all extremities well, no edema, no cyanosis, no  redness, no clubbing.   Pulses:   Pulses palpable and equal bilaterally.   Skin:   Moist and warm. No bleeding, bruising or rash.   Neurologic/Psychiatric:   Alert and oriented to person, place, and time.  Motor, power and tone in upper and lower extremities are grossly intact. No focal neurological deficits. Normal cognitive function. No psychomotor reaction or tangential thought. No depression, homicidal ideations and suicidal ideations.       Medication Review:   Current Facility-Administered Medications   Medication Dose Route Frequency Provider Last Rate Last Admin   • acetaminophen (TYLENOL) tablet 650 mg  650 mg Oral Q4H PRN Alejandro Brunson MD        Or   • acetaminophen (TYLENOL) suppository 650 mg  650 mg Rectal Q4H PRN Alejandro Brunson MD       • albuterol (PROVENTIL) nebulizer solution 0.083% 2.5 mg/3mL  2.5 mg Nebulization Q4H PRN Alejandro Brunson MD       • apixaban (ELIQUIS) tablet 5 mg  5 mg Oral Q12H Alejandro Brunson MD       • calcium carbonate (TUMS) chewable tablet 500 mg (200 mg elemental)  2 tablet Oral BID PRN Alejandro Brunson MD       • [START ON 7/21/2021] digoxin (LANOXIN) tablet 250 mcg  250 mcg Oral Daily Alejandro Brunson MD       • DOBUTamine (DOBUTREX) 1 mg/mL infusion  8 mcg/kg/min Intravenous Continuous Alejandro Brunson MD       • [START ON 7/21/2021] furosemide (LASIX) tablet 80 mg  80 mg Oral BID Boy  Alejandro WILSON MD       • magnesium oxide (MAG-OX) tablet 400 mg  400 mg Oral BID Alejandro Brunson MD       • Magnesium Sulfate 2 gram Bolus, followed by 8 gram infusion (total Mg dose 10 grams)- Mg less than or equal to 1mg/dL  2 g Intravenous PRN Alejandro Brunson MD        Or   • Magnesium Sulfate 2 gram / 50mL Infusion (GIVE X 3 BAGS TO EQUAL 6GM TOTAL DOSE) - Mg 1.1 - 1.5 mg/dl  2 g Intravenous PRN Alejandro Brunson MD        Or   • Magnesium Sulfate 4 gram infusion- Mg 1.6-1.9 mg/dL  4 g Intravenous PRN Alejandro Brunson MD       • metoprolol tartrate (LOPRESSOR) half tablet 12.5 mg  12.5 mg Oral Nightly Alejandro Brunson MD       • ondansetron (ZOFRAN) tablet 4 mg  4 mg Oral Q6H PRN Alejandro Brunson MD        Or   • ondansetron (ZOFRAN) injection 4 mg  4 mg Intravenous Q6H PRN Alejandro Brunson MD       • [START ON 7/21/2021] pantoprazole (PROTONIX) EC tablet 40 mg  40 mg Oral Q AM Alejandro Brunson MD       • potassium chloride (KLOR-CON) packet 40 mEq  40 mEq Oral PRN Alejandro Brunson MD       • potassium chloride (MICRO-K) CR capsule 40 mEq  40 mEq Oral PRN Alejandro Brunson MD       • sacubitril-valsartan (ENTRESTO) 24-26 MG tablet 1 tablet  1 tablet Oral Q12H Alejandro Brunson MD       • sodium chloride 0.9 % flush 10 mL  10 mL Intravenous PRN Brenna Parrish APRN       • sodium chloride 0.9 % flush 10 mL  10 mL Intravenous Q12H Alejandro Brunson MD       • sodium chloride 0.9 % flush 10 mL  10 mL Intravenous PRN Alejandro Brunson MD       • [START ON 7/21/2021] spironolactone (ALDACTONE) half tablet 12.5 mg  12.5 mg Oral Daily Alejandro Brunson MD       • zolpidem (AMBIEN) tablet 10 mg  10 mg Oral Nightly PRN Alejandro Brunson MD           Lab Review:     Results from last 7 days   Lab Units 07/20/21  1233   SODIUM mmol/L 135*   POTASSIUM mmol/L 4.2   CHLORIDE mmol/L 98   CO2 mmol/L 25.0   BUN mg/dL 24*   CREATININE mg/dL 1.09   CALCIUM mg/dL 9.4   BILIRUBIN mg/dL 2.0*   ALK PHOS U/L 110   ALT (SGPT) U/L 21   AST (SGOT) U/L 30    GLUCOSE mg/dL 102*     Results from last 7 days   Lab Units 07/20/21  1625 07/20/21  1233   TROPONIN T ng/mL 0.010 0.013         Results from last 7 days   Lab Units 07/20/21  1233   WBC 10*3/mm3 4.83   HEMOGLOBIN g/dL 12.2*   HEMATOCRIT % 39.0   PLATELETS 10*3/mm3 206     Results from last 7 days   Lab Units 07/20/21  1306   INR  1.39*   APTT seconds 31.5                       EKG:   ECG/EMG Results (last 24 hours)     Procedure Component Value Units Date/Time    ECG 12 Lead [[id number]] Collected: 07/20/21 1221     Updated: 07/20/21 1336     QT Interval 352 ms      QTC Interval 413 ms     Narrative:      Test Reason : SOA  Blood Pressure :   */*   mmHG  Vent. Rate :  83 BPM     Atrial Rate :  83 BPM     P-R Int : 180 ms          QRS Dur : 116 ms      QT Int : 352 ms       P-R-T Axes :  40 -38 114 degrees     QTc Int : 413 ms    Normal sinus rhythm  Nonspecific intraventricular conduction delay  Possible Left atrial enlargement  Left axis deviation  Left ventricular hypertrophy with QRS widening and repolarization abnormality  Cannot rule out Septal infarct , age undetermined  Abnormal ECG  When compared with ECG of 30-DEC-2020 20:15,  Minimal criteria for Septal infarct are now Present    Referred By: ERDR           Confirmed By: REENA RYAN          ECHO:  Results for orders placed during the hospital encounter of 07/23/20    Adult Transthoracic Echo Limited W/ Cont if Necessary Per Protocol    Interpretation Summary  · Left atrial cavity size is moderate-to-severely dilated.  · Mild mitral valve regurgitation is present  · Mild to moderate tricuspid valve regurgitation is present.  · The left ventricular cavity is mildly dilated.  · Mildly reduced right ventricular systolic function noted.  · The following left ventricular wall segments are hypokinetic: mid anterior, apical anterior, basal anterolateral, mid anterolateral, apical lateral, basal inferolateral, mid inferolateral, apical inferior, mid inferior,  apical septal, basal inferoseptal, mid inferoseptal, apex hypokinetic, mid anteroseptal, basal anterior, basal inferior and basal inferoseptal.       Imaging:  Imaging Results (Last 24 Hours)     Procedure Component Value Units Date/Time    XR Chest 2 View [[id number]] Collected: 07/20/21 1308     Updated: 07/20/21 1336    Narrative:      Chest x-ray PA and lateral           CLINICAL INDICATION: Shortness of breath.     COMPARISON: Chest x-ray December 30, 2020. None    FINDINGS: Cardiac silhouette is enlarged in size. Pulmonary  vascularity is unremarkable.   Pacemaker, AICD lead with tip in right ventricle.  No focal infiltrate or consolidation.  No pleural effusion.  No  pneumothorax.  Minimal discoid fibrotic changes right lung base.    Impression:      Cardiomegaly and pacemaker, AICD lead with tip in  right ventricle. Minimal discoid fibrotic changes right lung  base. Otherwise unremarkable chest.    Electronically signed by:  Yamil Bennett MD  7/20/2021 1:35 PM CDT  Workstation: [id number]          I personally viewed and interpreted the patient's EKG/Telemetry data.    Assessment:   1.  Chest pain shortness of breath.  2.  Nonischemic cardiomyopathy with left ventricular systolic dysfunction with an ejection fraction of 15 to 20%.  3.  S/p single-chamber AICD placement [facility].  4.  Left ventricular thrombus on chronic anticoagulation with Eliquis.          Plan:   1.  Chest pain.  Patient troponin was negative.  Patient had undergone previous coronary angiogram which had not reveal of any evidence of any obstructive epicardial coronary artery disease and patient had nonischemic cardiomyopathy.  Patient at the present time would not be subjected to any invasive evaluation for symptoms of chest pain.  Patient symptoms of chest pain is atypical.    2.  Shortness of breath.  Patient has nonischemic cardiomyopathy with chronic systolic heart failure.  Patient is on Entresto metoprolol.  Patient has a  single-chamber [employer] AICD placement.  Patient was evaluated at the cardiac transplant center at [facility] in [place].  Patient was not a candidate for LVAD secondary to the patient LV thrombus.  Patient would be started on IV dobutamine infusion and would continue with the metoprolol and Entresto and the Lasix.  Patient has been counseled extensively to be compliant with a low-salt diet.  Would follow the renal function and would continue gentle diuresis.    3.  [employer] single-chamber AICD.  Patient would undergo AICD interrogation to evaluate the AICD function.  Patient has not had any delivery of shock.    4.  Chronic anticoagulation with Eliquis.  Patient has not complained of having any episodes of any bleeding diathesis of hemoptysis hematuria bright red blood per rectum.    5.  Valvular insufficiency with moderate tricuspid regurgitation and mild to moderate mitral regurgitation.  Patient would be continued on the Entresto and would increase the dose as tolerated the patient blood pressure has been on the low side.  Patient had not complained of having any symptoms of dizziness.    6.  Attention deficit hyperactivity disorder is noted.    7.  Obstructive sleep apnea.  Patient has been recommended to be compliant with the CPAP.    Thank you for the consultation.    The above plan of management were discussed with the patient      Time: Time spent in face-to-face evaluation of greater than 55 minutes interacting, formulating, examining and discussing the plan with the patient with 50% of greater time spent in face-to-face interaction.    Electronically signed by Owen ePrez MD, 07/20/21, 6:24 PM CDT.    Dictated utilizing Dragon dictation.       Electronically signed by Owen Perez MD at 07/21/21 4185

## 2021-07-21 NOTE — PLAN OF CARE
Goal Outcome Evaluation:  Plan of Care Reviewed With: patient        Progress: no change     Pt VSS.  Dobutamine gtt initiated.  No complaints at this time.  Will continue to monitor.

## 2021-07-21 NOTE — PLAN OF CARE
Goal Outcome Evaluation:  Plan of Care Reviewed With: patient           Outcome Summary: cont dobutamine gtt; vss; no complaints noted

## 2021-07-21 NOTE — CONSULTS
Adult Nutrition  Assessment    Patient Name:  Didier Dahl  YOB: 1980  MRN: [id number]  Admit Date:  7/20/2021    Assessment Date:  7/21/2021    Comments:  Pt with dx Heart Failure.  Pt reports 10  Lb fluid wt gain the past 1 1/2 mo.  Lasix, Aldactone prescribed.  Mild Sodium Restricted Diet info used to provide Low Sodium Diet ed and diet copy given.  Fluid restriction reviewed and info provided.    Reason for Assessment     Row Name 07/21/21 1535          Reason for Assessment    Reason For Assessment  per organizational policy Low Sodium Diet ed     Diagnosis  cardiac disease dx Heart Failure     Identified At Risk by Screening Criteria  need for education                             Electronically signed by:  Jess Gonzalez RD  07/21/21 15:36 CDT

## 2021-07-21 NOTE — PROGRESS NOTES
[facility] Medicine Services  INPATIENT PROGRESS NOTE    Length of Stay: 0  Date of Admission: 7/20/2021  Primary Care Physician: Joslyn Rousseau APRN    Subjective   Chief Complaint: No complaints    HPI:      7/21/2021:  Patient became hypotensive today with lowest BP recorded at 76/50.  Dobutamine drip held and 250 cc NS bolus given.      H&P by Dr. Brunson:  Patient is a 41 year old male with medical hx notable for Systolic CHF, GERD, HTN, and hx of PE who presented to the ED due to worsening chest discomfort, shortness of breath, and weight gain for the past one week.  He notes he has been to  and evaluated for heart transplant in the past and follows with Dr. Perez here.  He denies any fever or chills but notes issues recently with cough.  He states that he has been taking his diuretics as prescribed and attempts to follow a fluid restriction at home.  He denies any vomiting but notes occasional nausea.  He also reports some left arm discomfort.      Evaluation in the ED was notable for CBC which showed Hgb of 12.2 but was otherwise unremarkable.  Digoxin level and troponin T were both normal.  ProBNP was elevated at 8,369.  CMP showed glucose of 102, BUN 24, sodium 135, and total bilirubin 2.0.  PTT was normal.  PT/INR was 16.8 and 1.39.  UA was negative.  UDS showed Amphetamines but was otherwise unremarkable.  COVID and flu screening was normal.      Review of Systems   Constitutional: Negative for activity change and fatigue.   HENT: Negative for ear pain and sore throat.    Eyes: Negative for pain and discharge.   Respiratory: Positive for shortness of breath. Negative for cough.    Cardiovascular: Negative for chest pain and palpitations.   Gastrointestinal: Negative for abdominal pain and nausea.   Endocrine: Negative for cold intolerance and heat intolerance.   Genitourinary: Negative for difficulty urinating and dysuria.   Musculoskeletal: Negative for  arthralgias and gait problem.   Skin: Negative for color change and rash.   Neurological: Negative for dizziness and weakness.   Psychiatric/Behavioral: Negative for agitation and confusion.        Objective    Temp:  [97 °F (36.1 °C)-97.8 °F (36.6 °C)] 97.3 °F (36.3 °C)  Heart Rate:  [60-95] 76  Resp:  [18-22] 18  BP: ()/(50-80) 82/58    Physical Exam  Constitutional:       Appearance: He is well-developed.   HENT:      Head: Normocephalic and atraumatic.   Eyes:      Pupils: Pupils are equal, round, and reactive to light.   Cardiovascular:      Rate and Rhythm: Normal rate and regular rhythm.   Pulmonary:      Effort: Pulmonary effort is normal.      Breath sounds: Normal breath sounds.   Abdominal:      General: Bowel sounds are normal.      Palpations: Abdomen is soft.   Musculoskeletal:         General: Normal range of motion.      Cervical back: Normal range of motion and neck supple.   Skin:     General: Skin is warm and dry.   Neurological:      Mental Status: He is alert and oriented to person, place, and time.   Psychiatric:         Behavior: Behavior normal.       Results Review:  I have reviewed the labs, radiology results, and diagnostic studies.    Laboratory Data:   Results from last 7 days   Lab Units 07/21/21  0705 07/20/21  1233   SODIUM mmol/L 134* 135*   POTASSIUM mmol/L 3.8 4.2   CHLORIDE mmol/L 97* 98   CO2 mmol/L 27.0 25.0   BUN mg/dL 20 24*   CREATININE mg/dL 0.94 1.09   GLUCOSE mg/dL 112* 102*   CALCIUM mg/dL 9.0 9.4   BILIRUBIN mg/dL 1.4* 2.0*   ALK PHOS U/L 110 110   ALT (SGPT) U/L 19 21   AST (SGOT) U/L 27 30   ANION GAP mmol/L 10.0 12.0     Estimated Creatinine Clearance: 123.1 mL/min (by C-G formula based on SCr of 0.94 mg/dL).          Results from last 7 days   Lab Units 07/21/21  0705 07/20/21  1233   WBC 10*3/mm3 3.94 4.83   HEMOGLOBIN g/dL 11.8* 12.2*   HEMATOCRIT % 37.2* 39.0   PLATELETS 10*3/mm3 169 206     Results from last 7 days   Lab Units 07/20/21  1306   INR  1.39*        Culture Data:   No results found for: BLOODCX  No results found for: URINECX  No results found for: RESPCX  No results found for: WOUNDCX  No results found for: STOOLCX  No components found for: BODYFLD    Radiology Data:   Imaging Results (Last 24 Hours)     ** No results found for the last 24 hours. **          I have reviewed the patient's current medications.     Assessment/Plan     Active Hospital Problems    Diagnosis  POA   • Systolic congestive heart failure (CMS/[facility]) [I50.20]  Yes       Plan:    1.  Acute on chronic systolic heart failure:  Dr. Perez consult appreciated.  Dobutamine drip. Continue IV Lasix, strict I&O, daily weights and sodium/fluid restrictions.   2.  ADHD:  Continue Vyvanse.      Discharge Planning: I expect patient to be discharged to home in 2-3 days.    I confirmed that the patient's Advance Care Plan is present, code status is documented, or surrogate decision maker is listed in the patient's medical record.      I have utilized all available immediate resources to obtain, update, or review the patient's current medications.         This document has been electronically signed by MARCIE Gusman on July 21, 2021 16:34 CDT

## 2021-07-21 NOTE — NURSING NOTE
MD Chris's office called and notified for MD Chris to return page when able.      MARCIE Dey returned page and informed of patient status and inability to reach MD Chris. Informed this RN that she would attempt to contact MD Chris.

## 2021-07-21 NOTE — SIGNIFICANT NOTE
CNA called and notified this RN at 1521 that pt blood pressure was low. RN presented to room and manually assessed bp it was 80/50. Carlee charge nurse notified of low bp and instructed this Rn to notify hospitalist.    1538 hospitalist MARCIE Dey paged     hospitalist returned page at 1544 and notified of low BP and dobutamine stopped until further instruction. Yissel JACK indicated for this RN to contact Chris ZULUAGA as he is managing the dobutamine drip.    1552 Chris ZULUAGA paged. Waiting for return call with further instruction.

## 2021-07-22 LAB
ALBUMIN SERPL-MCNC: 3.5 G/DL (ref 3.5–5.2)
ALBUMIN/GLOB SERPL: 1.3 G/DL
ALP SERPL-CCNC: 108 U/L (ref 39–117)
ALT SERPL W P-5'-P-CCNC: 20 U/L (ref 1–41)
ANION GAP SERPL CALCULATED.3IONS-SCNC: 9 MMOL/L (ref 5–15)
AST SERPL-CCNC: 29 U/L (ref 1–40)
BILIRUB SERPL-MCNC: 1.1 MG/DL (ref 0–1.2)
BUN SERPL-MCNC: 16 MG/DL (ref 6–20)
BUN/CREAT SERPL: 18.4 (ref 7–25)
CALCIUM SPEC-SCNC: 8.5 MG/DL (ref 8.6–10.5)
CHLORIDE SERPL-SCNC: 98 MMOL/L (ref 98–107)
CO2 SERPL-SCNC: 26 MMOL/L (ref 22–29)
CREAT SERPL-MCNC: 0.87 MG/DL (ref 0.76–1.27)
GFR SERPL CREATININE-BSD FRML MDRD: 98 ML/MIN/1.73
GLOBULIN UR ELPH-MCNC: 2.6 GM/DL
GLUCOSE SERPL-MCNC: 111 MG/DL (ref 65–99)
POTASSIUM SERPL-SCNC: 3.9 MMOL/L (ref 3.5–5.2)
PROT SERPL-MCNC: 6.1 G/DL (ref 6–8.5)
SODIUM SERPL-SCNC: 133 MMOL/L (ref 136–145)

## 2021-07-22 PROCEDURE — 80053 COMPREHEN METABOLIC PANEL: CPT | Performed by: FAMILY MEDICINE

## 2021-07-22 PROCEDURE — 25010000002 DOBUTAMINE PER 250 MG: Performed by: FAMILY MEDICINE

## 2021-07-22 RX ORDER — CARVEDILOL 3.12 MG/1
3.12 TABLET ORAL 2 TIMES DAILY WITH MEALS
Status: DISCONTINUED | OUTPATIENT
Start: 2021-07-23 | End: 2021-07-24 | Stop reason: HOSPADM

## 2021-07-22 RX ADMIN — Medication 400 MG: at 08:54

## 2021-07-22 RX ADMIN — Medication 12.5 MG: at 08:54

## 2021-07-22 RX ADMIN — Medication 400 MG: at 20:15

## 2021-07-22 RX ADMIN — FUROSEMIDE 80 MG: 80 TABLET ORAL at 16:09

## 2021-07-22 RX ADMIN — PANTOPRAZOLE SODIUM 40 MG: 40 TABLET, DELAYED RELEASE ORAL at 06:06

## 2021-07-22 RX ADMIN — DOBUTAMINE HYDROCHLORIDE 8 MCG/KG/MIN: 100 INJECTION INTRAVENOUS at 06:16

## 2021-07-22 RX ADMIN — DIGOXIN 250 MCG: 250 TABLET ORAL at 11:54

## 2021-07-22 RX ADMIN — SACUBITRIL AND VALSARTAN 0.5 TABLET: 24; 26 TABLET, FILM COATED ORAL at 20:15

## 2021-07-22 RX ADMIN — ZOLPIDEM TARTRATE 10 MG: 5 TABLET ORAL at 00:36

## 2021-07-22 RX ADMIN — METOPROLOL TARTRATE 12.5 MG: 25 TABLET, FILM COATED ORAL at 20:15

## 2021-07-22 RX ADMIN — APIXABAN 5 MG: 5 TABLET, FILM COATED ORAL at 08:54

## 2021-07-22 RX ADMIN — FUROSEMIDE 80 MG: 80 TABLET ORAL at 06:06

## 2021-07-22 RX ADMIN — SACUBITRIL AND VALSARTAN 1 TABLET: 24; 26 TABLET, FILM COATED ORAL at 08:54

## 2021-07-22 RX ADMIN — APIXABAN 5 MG: 5 TABLET, FILM COATED ORAL at 20:15

## 2021-07-22 RX ADMIN — DOBUTAMINE HYDROCHLORIDE 8 MCG/KG/MIN: 100 INJECTION INTRAVENOUS at 12:26

## 2021-07-22 RX ADMIN — DOBUTAMINE HYDROCHLORIDE 8 MCG/KG/MIN: 100 INJECTION INTRAVENOUS at 21:05

## 2021-07-22 NOTE — PLAN OF CARE
Goal Outcome Evaluation:  Plan of Care Reviewed With: patient        Progress: no change  Outcome Summary: pt continues wt gain, no additional PO fluids given; pt reeducated on importance of using urinal or accurate output

## 2021-07-22 NOTE — PROGRESS NOTES
Cardiology Progress Note     LOS: 1 day   Patient Care Team:  Joslyn Rousseau APRN as PCP - General (Nurse Practitioner)    Subjective:  Chart reviewed. Patient seen and examined. Patient symptoms of shortness of breath is improved.  Patient has not had any recurrent symptoms of chest pain.  Patient is tolerating IV dobutamine.  Patient has had episodes of low blood pressure though patient has not complain of having any symptoms of lightheaded dizziness.  Patient would be continued on the present IV dobutamine until the bag empties.  Patient does have chronic low blood pressure and would consider decreasing the dose of the Entresto and a lower dose of a beta-blocker.      Objective:  Temp:  [96.1 °F (35.6 °C)-97.7 °F (36.5 °C)] 97.7 °F (36.5 °C)  Heart Rate:  [76-93] 76  Resp:  [18] 18  BP: ()/(57-72) 100/58    Intake/Output Summary (Last 24 hours) at 7/22/2021 1853  Last data filed at 7/22/2021 1800  Gross per 24 hour   Intake 1200 ml   Output 1400 ml   Net -200 ml       Physical Exam:   General Appearance:    Alert, oriented, cooperative, in no acute distress.   Head:    Normocephalic, atraumatic, without obvious abnormality   Eyes:             SHRUTI. Lids and lashes normal, conjunctivae and sclerae normal, no icterus, no pallor.   Ears:    Ears appear intact with no abnormalities noted.   Throat:   Mucous membranes pink and moist.   Neck:  Supple, trachea midline, no carotid bruit, no organomegaly or JVD.   Lungs:    Clear to auscultation and percussion.  Respirations regular, even and unlabored. No wheezes, rales, or rhonchi.    Heart:    Regular rhythm and normal rate, normal S1 and S2, no      murmur, no gallop, no rub, no click.   Abdomen:    Soft, nontender, nondistended, no guarding, no rebound tenderness. Normal bowel sounds in all four quadrants, no masses, liver and spleen nonpalpable.    Genitalia:    Deferred.   Extremities:   Moves all extremities well, no edema, no cyanosis, no        redness, no clubbing.   Pulses:   Pulses palpable and equal bilaterally.   Skin:   Moist and warm. No bleeding, bruising or rash.   Neurologic/Psychiatric:   Alert and oriented to person, place, and time.  Motor, power and tone in upper and lower extremities are grossly intact.  No focal neurological deficits. Normal cognitive function. No psychomotor reaction or tangential thought. No depression, homicidal ideations and suicidal ideations.          Results Review:    Results from last 7 days   Lab Units 07/22/21  0620   SODIUM mmol/L 133*   POTASSIUM mmol/L 3.9   CHLORIDE mmol/L 98   CO2 mmol/L 26.0   BUN mg/dL 16   CREATININE mg/dL 0.87   CALCIUM mg/dL 8.5*   BILIRUBIN mg/dL 1.1   ALK PHOS U/L 108   ALT (SGPT) U/L 20   AST (SGOT) U/L 29   GLUCOSE mg/dL 111*     Results from last 7 days   Lab Units 07/20/21  1625 07/20/21  1233   TROPONIN T ng/mL 0.010 0.013         Results from last 7 days   Lab Units 07/21/21  0705   WBC 10*3/mm3 3.94   HEMOGLOBIN g/dL 11.8*   HEMATOCRIT % 37.2*   PLATELETS 10*3/mm3 169     Results from last 7 days   Lab Units 07/20/21  1306   INR  1.39*   APTT seconds 31.5                       ECHO:  Results for orders placed during the hospital encounter of 07/23/20    Adult Transthoracic Echo Limited W/ Cont if Necessary Per Protocol    Interpretation Summary  · Left atrial cavity size is moderate-to-severely dilated.  · Mild mitral valve regurgitation is present  · Mild to moderate tricuspid valve regurgitation is present.  · The left ventricular cavity is mildly dilated.  · Mildly reduced right ventricular systolic function noted.  · The following left ventricular wall segments are hypokinetic: mid anterior, apical anterior, basal anterolateral, mid anterolateral, apical lateral, basal inferolateral, mid inferolateral, apical inferior, mid inferior, apical septal, basal inferoseptal, mid inferoseptal, apex hypokinetic, mid anteroseptal, basal anterior, basal inferior and basal  inferoseptal.      ECG 12 Lead   Final Result   Test Reason : SOA   Blood Pressure :   */*   mmHG   Vent. Rate :  83 BPM     Atrial Rate :  83 BPM      P-R Int : 180 ms          QRS Dur : 116 ms       QT Int : 352 ms       P-R-T Axes :  40 -38 114 degrees      QTc Int : 413 ms      Normal sinus rhythm   Nonspecific intraventricular conduction delay   Possible Left atrial enlargement   Left axis deviation   Left ventricular hypertrophy with QRS widening and repolarization    abnormality   Cannot rule out Septal infarct , age undetermined   Abnormal ECG   When compared with ECG of 30-DEC-2020 20:15,   Minimal criteria for Septal infarct are now Present      Referred By: ERDR           Confirmed By: REENA RYAN      SCANNED EKG   Final Result           Medication Review:   Current Facility-Administered Medications   Medication Dose Route Frequency Provider Last Rate Last Admin   • acetaminophen (TYLENOL) tablet 650 mg  650 mg Oral Q4H PRN Alejandro Brunson MD        Or   • acetaminophen (TYLENOL) suppository 650 mg  650 mg Rectal Q4H PRN Alejandro Brunson MD       • albuterol (PROVENTIL) nebulizer solution 0.083% 2.5 mg/3mL  2.5 mg Nebulization Q4H PRN Alejandro Brunson MD       • apixaban (ELIQUIS) tablet 5 mg  5 mg Oral Q12H Alejandro Brunson MD   5 mg at 07/22/21 0854   • calcium carbonate (TUMS) chewable tablet 500 mg (200 mg elemental)  2 tablet Oral BID PRN Alejandro Brunson MD       • digoxin (LANOXIN) tablet 250 mcg  250 mcg Oral Daily Alejandro Brunson MD   250 mcg at 07/22/21 1154   • DOBUTamine (DOBUTREX) 1 mg/mL infusion  8 mcg/kg/min Intravenous Continuous Alejandro Brunson MD 39.1 mL/hr at 07/22/21 1226 8 mcg/kg/min at 07/22/21 1226   • furosemide (LASIX) tablet 80 mg  80 mg Oral BID Alejandro Brunson MD   80 mg at 07/22/21 1609   • magnesium oxide (MAG-OX) tablet 400 mg  400 mg Oral BID Alejandro Brunson MD   400 mg at 07/22/21 0854   • Magnesium Sulfate 2 gram Bolus, followed by 8 gram infusion (total Mg dose 10 grams)-  Mg less than or equal to 1mg/dL  2 g Intravenous PRN Alejandro Brunson MD        Or   • Magnesium Sulfate 2 gram / 50mL Infusion (GIVE X 3 BAGS TO EQUAL 6GM TOTAL DOSE) - Mg 1.1 - 1.5 mg/dl  2 g Intravenous PRN Alejandro Brunson MD        Or   • Magnesium Sulfate 4 gram infusion- Mg 1.6-1.9 mg/dL  4 g Intravenous PRN Alejandro Brunson MD       • metoprolol tartrate (LOPRESSOR) half tablet 12.5 mg  12.5 mg Oral Nightly Alejandro Brunson MD   12.5 mg at 07/20/21 2203   • ondansetron (ZOFRAN) tablet 4 mg  4 mg Oral Q6H PRN Alejandro Brunson MD        Or   • ondansetron (ZOFRAN) injection 4 mg  4 mg Intravenous Q6H PRN Alejandro Brunson MD       • pantoprazole (PROTONIX) EC tablet 40 mg  40 mg Oral Q AM Alejandro Brunson MD   40 mg at 07/22/21 0606   • PATIENT SUPPLIED MEDICATION  70 mg Oral Daily Yissel Jay APRN       • potassium chloride (KLOR-CON) packet 40 mEq  40 mEq Oral PRN Alejandro Brunson MD       • potassium chloride (MICRO-K) CR capsule 40 mEq  40 mEq Oral PRN Alejandro Brunson MD       • sacubitril-valsartan (ENTRESTO) 24-26 MG tablet 1 tablet  1 tablet Oral Q12H Alejandro Brunson MD   1 tablet at 07/22/21 0854   • sodium chloride 0.9 % flush 10 mL  10 mL Intravenous PRN Brenna Parrish APRN       • sodium chloride 0.9 % flush 10 mL  10 mL Intravenous Q12H Alejandro Brunson MD   10 mL at 07/21/21 2020   • sodium chloride 0.9 % flush 10 mL  10 mL Intravenous PRN Alejandro Brunson MD       • spironolactone (ALDACTONE) half tablet 12.5 mg  12.5 mg Oral Daily Alejandro Brunson MD   12.5 mg at 07/22/21 0854   • zolpidem (AMBIEN) tablet 10 mg  10 mg Oral Nightly PRN Alejandro Brunson MD   10 mg at 07/22/21 0036       Assessment and Plan:      Systolic congestive heart failure (CMS/[facility])  1.  Nonischemic cardiomyopathy with severe left ventricular systolic dysfunction.  Clinically at the present time patient is not compensated chronic congestive heart failure.  Patient would be continued on IV dobutamine until the bag empties.   Patient would be continued on the present dose of the Lasix.  Patient Entresto dose has been decreased secondary to the patient low blood pressure.  Patient has been ambulating without any symptoms of shortness of breath or lightheaded dizziness.  Patient has a follow-up appointment with the transplant center at [facility].  And would be continued on the present dose of the Lasix and Aldactone.    2.  Chest pain.  Patient had undergone previous coronary angiogram which had not reveal of any evidence of any obstructive epicardial coronary artery disease.  Patient had negative troponin.  Patient at the present time would not be subjected to any invasive evaluation and would be treated medically.    3.  LV thrombus with atrial septal defect.  Patient is on anticoagulation with Eliquis.  Patient has not had any episodes of any bleeding diastasis.  Patient hemoglobin has remained stable.    4.  Valvular insufficiency.  Patient has moderate tricuspid regurgitation and mild to moderate mitral regurgitation.  Patient is on Entresto and Lasix and Aldactone.    5.  Obstructive sleep apnea.  Patient has been recommended to be compliant with the CPAP.    6.  S/p [place] single-chamber AICD placement for nonischemic cardiomyopathy.  Patient AICD interrogation had revealed episodes of ventricular tachycardia with appropriate delivery of shock.  Patient is currently on amiodarone.  Patient has not had any further recurrence of any arrhythmia.    The above plan of management were discussed with the patient.            Electronically signed by Owen Perez MD, 07/22/21, 6:53 PM CDT.      Time: Time spent on face-to-face interaction 20-minute    Dictated utilizing Dragon dictation.

## 2021-07-22 NOTE — PROGRESS NOTES
Cardiology Progress Note     LOS: 0 days   Patient Care Team:  Joslyn Rousseau APRN as PCP - General (Nurse Practitioner)    Subjective:    Chart reviewed. Patient seen and examined. Patient had episodes of hypotension.  Patient episodes of hypotension was secondary to combination of Entresto and metoprolol.  Patient dobutamine was stopped.  Patient would be started back on the dobutamine and would consider holding Entresto which could be contributing to the patient hypotension.  Patient AICD interrogation had revealed evidence of nonsustained ventricular tachycardia with 1 delivery of shock.  Patient had appropriate AICD function.      Objective:  Temp:  [97 °F (36.1 °C)-97.8 °F (36.6 °C)] 97.3 °F (36.3 °C)  Heart Rate:  [60-95] 78  Resp:  [18-22] 18  BP: ()/(50-74) 90/74    Intake/Output Summary (Last 24 hours) at 7/21/2021 1932  Last data filed at 7/21/2021 1634  Gross per 24 hour   Intake 1090 ml   Output 2500 ml   Net -1410 ml       Physical Exam:   General Appearance:    Alert, oriented, cooperative, in no acute distress.   Head:    Normocephalic, atraumatic, without obvious abnormality   Eyes:             SHRUTI. Lids and lashes normal, conjunctivae and sclerae normal, no icterus, no pallor.   Ears:    Ears appear intact with no abnormalities noted.   Throat:   Mucous membranes pink and moist.   Neck:  Supple, trachea midline, no carotid bruit, no organomegaly or JVD.   Lungs:    Clear to auscultation and percussion.  Respirations regular, even and unlabored. No wheezes, rales, or rhonchi.    Heart:    Regular rhythm and normal rate, normal S1 and S2, no      murmur, no gallop, no rub, no click.   Abdomen:    Soft, nontender, nondistended, no guarding, no rebound tenderness. Normal bowel sounds in all four quadrants, no masses, liver and spleen nonpalpable.    Genitalia:    Deferred.   Extremities:   Moves all extremities well, no edema, no cyanosis, no       redness, no clubbing.   Pulses:   Pulses  palpable and equal bilaterally.   Skin:   Moist and warm. No bleeding, bruising or rash.   Neurologic/Psychiatric:   Alert and oriented to person, place, and time.  Motor, power and tone in upper and lower extremities are grossly intact.  No focal neurological deficits. Normal cognitive function. No psychomotor reaction or tangential thought. No depression, homicidal ideations and suicidal ideations.          Results Review:    Results from last 7 days   Lab Units 07/21/21  0705   SODIUM mmol/L 134*   POTASSIUM mmol/L 3.8   CHLORIDE mmol/L 97*   CO2 mmol/L 27.0   BUN mg/dL 20   CREATININE mg/dL 0.94   CALCIUM mg/dL 9.0   BILIRUBIN mg/dL 1.4*   ALK PHOS U/L 110   ALT (SGPT) U/L 19   AST (SGOT) U/L 27   GLUCOSE mg/dL 112*     Results from last 7 days   Lab Units 07/20/21  1625 07/20/21  1233   TROPONIN T ng/mL 0.010 0.013         Results from last 7 days   Lab Units 07/21/21  0705   WBC 10*3/mm3 3.94   HEMOGLOBIN g/dL 11.8*   HEMATOCRIT % 37.2*   PLATELETS 10*3/mm3 169     Results from last 7 days   Lab Units 07/20/21  1306   INR  1.39*   APTT seconds 31.5                       ECHO:  Results for orders placed during the hospital encounter of 07/23/20    Adult Transthoracic Echo Limited W/ Cont if Necessary Per Protocol    Interpretation Summary  · Left atrial cavity size is moderate-to-severely dilated.  · Mild mitral valve regurgitation is present  · Mild to moderate tricuspid valve regurgitation is present.  · The left ventricular cavity is mildly dilated.  · Mildly reduced right ventricular systolic function noted.  · The following left ventricular wall segments are hypokinetic: mid anterior, apical anterior, basal anterolateral, mid anterolateral, apical lateral, basal inferolateral, mid inferolateral, apical inferior, mid inferior, apical septal, basal inferoseptal, mid inferoseptal, apex hypokinetic, mid anteroseptal, basal anterior, basal inferior and basal inferoseptal.      ECG 12 Lead   Final Result   Test  Reason : SOA   Blood Pressure :   */*   mmHG   Vent. Rate :  83 BPM     Atrial Rate :  83 BPM      P-R Int : 180 ms          QRS Dur : 116 ms       QT Int : 352 ms       P-R-T Axes :  40 -38 114 degrees      QTc Int : 413 ms      Normal sinus rhythm   Nonspecific intraventricular conduction delay   Possible Left atrial enlargement   Left axis deviation   Left ventricular hypertrophy with QRS widening and repolarization    abnormality   Cannot rule out Septal infarct , age undetermined   Abnormal ECG   When compared with ECG of 30-DEC-2020 20:15,   Minimal criteria for Septal infarct are now Present      Referred By: AIDANR           Confirmed By: REENA RYAN      SCANNED EKG   Final Result           Medication Review:   Current Facility-Administered Medications   Medication Dose Route Frequency Provider Last Rate Last Admin   • acetaminophen (TYLENOL) tablet 650 mg  650 mg Oral Q4H PRN Alejandro Brunson MD        Or   • acetaminophen (TYLENOL) suppository 650 mg  650 mg Rectal Q4H PRN Alejandro Brunson MD       • albuterol (PROVENTIL) nebulizer solution 0.083% 2.5 mg/3mL  2.5 mg Nebulization Q4H PRN Alejandro Brunson MD       • apixaban (ELIQUIS) tablet 5 mg  5 mg Oral Q12H Alejandro Brunson MD   5 mg at 07/21/21 0805   • calcium carbonate (TUMS) chewable tablet 500 mg (200 mg elemental)  2 tablet Oral BID PRN Alejandro Brunson MD       • digoxin (LANOXIN) tablet 250 mcg  250 mcg Oral Daily Alejandro Brunson MD   250 mcg at 07/21/21 1131   • DOBUTamine (DOBUTREX) 1 mg/mL infusion  8 mcg/kg/min Intravenous Continuous Alejandro Brunson MD 39.1 mL/hr at 07/21/21 1806 8 mcg/kg/min at 07/21/21 1806   • furosemide (LASIX) tablet 80 mg  80 mg Oral BID Alejandro Brunson MD   80 mg at 07/21/21 0647   • magnesium oxide (MAG-OX) tablet 400 mg  400 mg Oral BID Alejandro Brunson MD   400 mg at 07/21/21 0805   • Magnesium Sulfate 2 gram Bolus, followed by 8 gram infusion (total Mg dose 10 grams)- Mg less than or equal to 1mg/dL  2 g Intravenous PRN  Alejandro Brunson MD        Or   • Magnesium Sulfate 2 gram / 50mL Infusion (GIVE X 3 BAGS TO EQUAL 6GM TOTAL DOSE) - Mg 1.1 - 1.5 mg/dl  2 g Intravenous PRN Alejandro Brunson MD        Or   • Magnesium Sulfate 4 gram infusion- Mg 1.6-1.9 mg/dL  4 g Intravenous PRN Alejandro Brunson MD       • metoprolol tartrate (LOPRESSOR) half tablet 12.5 mg  12.5 mg Oral Nightly Alejandro Brunson MD   12.5 mg at 07/20/21 2203   • ondansetron (ZOFRAN) tablet 4 mg  4 mg Oral Q6H PRN Alejandro Brunson MD        Or   • ondansetron (ZOFRAN) injection 4 mg  4 mg Intravenous Q6H PRN Alejandro Brunson MD       • pantoprazole (PROTONIX) EC tablet 40 mg  40 mg Oral Q AM Alejandro Brunson MD   40 mg at 07/21/21 0647   • PATIENT SUPPLIED MEDICATION  70 mg Oral Daily Yissel Jay APRN       • potassium chloride (KLOR-CON) packet 40 mEq  40 mEq Oral PRN Alejandro Brunson MD       • potassium chloride (MICRO-K) CR capsule 40 mEq  40 mEq Oral PRN Alejandro Brunson MD       • sacubitril-valsartan (ENTRESTO) 24-26 MG tablet 1 tablet  1 tablet Oral Q12H Alejandro Brunson MD   1 tablet at 07/21/21 0805   • sodium chloride 0.9 % flush 10 mL  10 mL Intravenous PRN Brenna Parrish APRN       • sodium chloride 0.9 % flush 10 mL  10 mL Intravenous Q12H Alejandro Brunson MD   10 mL at 07/21/21 0805   • sodium chloride 0.9 % flush 10 mL  10 mL Intravenous PRN Alejandro Brunson MD       • spironolactone (ALDACTONE) half tablet 12.5 mg  12.5 mg Oral Daily Alejandro Brunson MD   12.5 mg at 07/21/21 0805   • zolpidem (AMBIEN) tablet 10 mg  10 mg Oral Nightly PRN Alejandro Brunson MD           Assessment and Plan:      Systolic congestive heart failure (CMS/HCC)  1.  Nonischemic cardiomyopathy with severe left ventricular systolic dysfunction.  Patient has nonischemic cardiomyopathy with chronic systolic heart failure.  Patient was evaluated at the transplant center at [facility].  Patient is currently on digoxin and Lasix.  Patient would be continued on the  present dose of the metoprolol and would consider lowering the dose of Entresto.  Would not consider IV hydration for hypotension as the patient does have severe left ventricular systolic dysfunction with an ejection fraction of 15% with dilated cardiomyopathy.    2.  S/p [place] single-chamber AICD placement.  Patient AICD interrogation had revealed episodes of ventricular tachycardia with delivery of shock which was appropriate.  Patient has adequate battery reserve.    3.  Hypotension.  Patient episodes of hypotension is multifactorial secondary to Entresto metoprolol and severe left ventricular systolic dysfunction.  Patient had not complained of having any symptoms of lightheaded dizziness near syncope.    4.  Atypical chest pain.  Patient has not had any further recurrent symptoms of chest pain.    5.  Chronic anticoagulation with Eliquis.  Patient had not had any hemoptysis hematuria bright of blood per rectum.  Patient hemoglobin 11.8.    Above plan of management were discussed with the patient          Electronically signed by Owen Perez MD, 07/21/21, 7:32 PM CDT.      Time: Time spent on face-to-face interaction 20 minutes

## 2021-07-22 NOTE — PROGRESS NOTES
[facility] Medicine Services  INPATIENT PROGRESS NOTE    Length of Stay: 1  Date of Admission: 7/20/2021  Primary Care Physician: Joslyn Rousseau APRN    Subjective   Chief Complaint: Fatigue  HPI:   No new problems today. Says he has not slept much in the past few days and is feeling very weak and tired. Attributes some of this to lack of home medication vyvanse. Denies chest pain but continues with chest heaviness of shortness of breath with minimal exertion.   States he is wearing two pairs of socks to aid his LE edema. This is improved over last 1-2 days.     Review of Systems     All pertinent negatives and positives are as above. All other systems have been reviewed and are negative unless otherwise stated.     Objective    Temp:  [96.1 °F (35.6 °C)-97.7 °F (36.5 °C)] 97.7 °F (36.5 °C)  Heart Rate:  [76-93] 76  Resp:  [18] 18  BP: ()/(57-72) 100/58    Physical Exam  Vitals and nursing note reviewed.   Constitutional:       General: He is not in acute distress.     Appearance: Normal appearance.   Cardiovascular:      Rate and Rhythm: Normal rate and regular rhythm.      Pulses: Normal pulses.      Heart sounds: Normal heart sounds.   Pulmonary:      Effort: Pulmonary effort is normal. No respiratory distress.      Comments: Poor inspiratory effort, limited bibasilar air exchange otherwise clear.   Musculoskeletal:      Right lower leg: No edema.      Left lower leg: No edema.   Skin:     General: Skin is warm and dry.   Neurological:      General: No focal deficit present.      Mental Status: He is alert and oriented to person, place, and time.   Psychiatric:         Mood and Affect: Mood normal.         Behavior: Behavior normal.             Results Review:  I have reviewed the labs, radiology results, and diagnostic studies.    Laboratory Data:   Results from last 7 days   Lab Units 07/22/21  0620 07/21/21  0705 07/20/21  1233   SODIUM mmol/L 133* 134*  135*   POTASSIUM mmol/L 3.9 3.8 4.2   CHLORIDE mmol/L 98 97* 98   CO2 mmol/L 26.0 27.0 25.0   BUN mg/dL 16 20 24*   CREATININE mg/dL 0.87 0.94 1.09   GLUCOSE mg/dL 111* 112* 102*   CALCIUM mg/dL 8.5* 9.0 9.4   BILIRUBIN mg/dL 1.1 1.4* 2.0*   ALK PHOS U/L 108 110 110   ALT (SGPT) U/L 20 19 21   AST (SGOT) U/L 29 27 30   ANION GAP mmol/L 9.0 10.0 12.0     Estimated Creatinine Clearance: 134 mL/min (by C-G formula based on SCr of 0.87 mg/dL).          Results from last 7 days   Lab Units 07/21/21  0705 07/20/21  1233   WBC 10*3/mm3 3.94 4.83   HEMOGLOBIN g/dL 11.8* 12.2*   HEMATOCRIT % 37.2* 39.0   PLATELETS 10*3/mm3 169 206     Results from last 7 days   Lab Units 07/20/21  1306   INR  1.39*       Culture Data:   No results found for: BLOODCX  No results found for: URINECX  No results found for: RESPCX  No results found for: WOUNDCX  No results found for: STOOLCX  No components found for: BODYFLD    Radiology Data:   Imaging Results (Last 24 Hours)     ** No results found for the last 24 hours. **          I have reviewed the patient's current medications.     Assessment/Plan         Systolic congestive heart failure (CMS/HCC)-Nonischemic cardiomyopathy with severe left ventricular systolic dysfunction and EF 15% with marked hypotension. Cardiology notes reviewed. Continue dobutamine infusion for now. May need to consider holding Entresto. Continue metoprolol for now. AICD has been interrogated and is functional. Continue cardiac monitoring. Weight actually up 2# since admission, will follow volume status closely.           Discharge Planning: I expect patient to be discharged in 2-3 days.     Aracelis Booth MD      I confirmed that the patient's Advance Care Plan is present, code status is documented, or surrogate decision maker is listed in the patient's medical record.

## 2021-07-23 LAB
ALBUMIN SERPL-MCNC: 3.9 G/DL (ref 3.5–5.2)
ALBUMIN/GLOB SERPL: 1.4 G/DL
ALP SERPL-CCNC: 110 U/L (ref 39–117)
ALT SERPL W P-5'-P-CCNC: 23 U/L (ref 1–41)
ANION GAP SERPL CALCULATED.3IONS-SCNC: 9 MMOL/L (ref 5–15)
AST SERPL-CCNC: 34 U/L (ref 1–40)
BASOPHILS # BLD AUTO: 0.03 10*3/MM3 (ref 0–0.2)
BASOPHILS NFR BLD AUTO: 0.8 % (ref 0–1.5)
BILIRUB SERPL-MCNC: 1.1 MG/DL (ref 0–1.2)
BUN SERPL-MCNC: 23 MG/DL (ref 6–20)
BUN/CREAT SERPL: 24.7 (ref 7–25)
CALCIUM SPEC-SCNC: 8.8 MG/DL (ref 8.6–10.5)
CHLORIDE SERPL-SCNC: 97 MMOL/L (ref 98–107)
CO2 SERPL-SCNC: 25 MMOL/L (ref 22–29)
CREAT SERPL-MCNC: 0.93 MG/DL (ref 0.76–1.27)
DEPRECATED RDW RBC AUTO: 59.5 FL (ref 37–54)
EOSINOPHIL # BLD AUTO: 0.06 10*3/MM3 (ref 0–0.4)
EOSINOPHIL NFR BLD AUTO: 1.5 % (ref 0.3–6.2)
ERYTHROCYTE [DISTWIDTH] IN BLOOD BY AUTOMATED COUNT: 19 % (ref 12.3–15.4)
GFR SERPL CREATININE-BSD FRML MDRD: 90 ML/MIN/1.73
GLOBULIN UR ELPH-MCNC: 2.7 GM/DL
GLUCOSE SERPL-MCNC: 95 MG/DL (ref 65–99)
HCT VFR BLD AUTO: 35.5 % (ref 37.5–51)
HGB BLD-MCNC: 11.6 G/DL (ref 13–17.7)
IMM GRANULOCYTES # BLD AUTO: 0 10*3/MM3 (ref 0–0.05)
IMM GRANULOCYTES NFR BLD AUTO: 0 % (ref 0–0.5)
LYMPHOCYTES # BLD AUTO: 0.9 10*3/MM3 (ref 0.7–3.1)
LYMPHOCYTES NFR BLD AUTO: 22.7 % (ref 19.6–45.3)
MAGNESIUM SERPL-MCNC: 2.4 MG/DL (ref 1.6–2.6)
MCH RBC QN AUTO: 29.1 PG (ref 26.6–33)
MCHC RBC AUTO-ENTMCNC: 32.7 G/DL (ref 31.5–35.7)
MCV RBC AUTO: 89 FL (ref 79–97)
MONOCYTES # BLD AUTO: 0.45 10*3/MM3 (ref 0.1–0.9)
MONOCYTES NFR BLD AUTO: 11.4 % (ref 5–12)
NEUTROPHILS NFR BLD AUTO: 2.52 10*3/MM3 (ref 1.7–7)
NEUTROPHILS NFR BLD AUTO: 63.6 % (ref 42.7–76)
NRBC BLD AUTO-RTO: 0 /100 WBC (ref 0–0.2)
PLATELET # BLD AUTO: 169 10*3/MM3 (ref 140–450)
PMV BLD AUTO: 10.4 FL (ref 6–12)
POTASSIUM SERPL-SCNC: 4.6 MMOL/L (ref 3.5–5.2)
PROT SERPL-MCNC: 6.6 G/DL (ref 6–8.5)
RBC # BLD AUTO: 3.99 10*6/MM3 (ref 4.14–5.8)
SODIUM SERPL-SCNC: 131 MMOL/L (ref 136–145)
WBC # BLD AUTO: 3.96 10*3/MM3 (ref 3.4–10.8)

## 2021-07-23 PROCEDURE — 83735 ASSAY OF MAGNESIUM: CPT | Performed by: FAMILY MEDICINE

## 2021-07-23 PROCEDURE — 94760 N-INVAS EAR/PLS OXIMETRY 1: CPT

## 2021-07-23 PROCEDURE — 80053 COMPREHEN METABOLIC PANEL: CPT | Performed by: FAMILY MEDICINE

## 2021-07-23 PROCEDURE — 94799 UNLISTED PULMONARY SVC/PX: CPT

## 2021-07-23 PROCEDURE — 85025 COMPLETE CBC W/AUTO DIFF WBC: CPT | Performed by: FAMILY MEDICINE

## 2021-07-23 RX ORDER — BISACODYL 5 MG/1
10 TABLET, DELAYED RELEASE ORAL DAILY PRN
Status: DISCONTINUED | OUTPATIENT
Start: 2021-07-23 | End: 2021-07-24 | Stop reason: HOSPADM

## 2021-07-23 RX ADMIN — ZOLPIDEM TARTRATE 10 MG: 5 TABLET ORAL at 01:47

## 2021-07-23 RX ADMIN — SODIUM CHLORIDE, PRESERVATIVE FREE 10 ML: 5 INJECTION INTRAVENOUS at 21:32

## 2021-07-23 RX ADMIN — FUROSEMIDE 80 MG: 80 TABLET ORAL at 06:21

## 2021-07-23 RX ADMIN — SACUBITRIL AND VALSARTAN 0.5 TABLET: 24; 26 TABLET, FILM COATED ORAL at 21:29

## 2021-07-23 RX ADMIN — APIXABAN 5 MG: 5 TABLET, FILM COATED ORAL at 08:24

## 2021-07-23 RX ADMIN — CARVEDILOL 3.12 MG: 3.12 TABLET, FILM COATED ORAL at 17:31

## 2021-07-23 RX ADMIN — APIXABAN 5 MG: 5 TABLET, FILM COATED ORAL at 21:30

## 2021-07-23 RX ADMIN — DIGOXIN 250 MCG: 250 TABLET ORAL at 12:32

## 2021-07-23 RX ADMIN — SACUBITRIL AND VALSARTAN 0.5 TABLET: 24; 26 TABLET, FILM COATED ORAL at 08:30

## 2021-07-23 RX ADMIN — SODIUM CHLORIDE, PRESERVATIVE FREE 10 ML: 5 INJECTION INTRAVENOUS at 08:29

## 2021-07-23 RX ADMIN — Medication 400 MG: at 08:24

## 2021-07-23 RX ADMIN — CARVEDILOL 3.12 MG: 3.12 TABLET, FILM COATED ORAL at 08:24

## 2021-07-23 RX ADMIN — Medication 400 MG: at 21:30

## 2021-07-23 RX ADMIN — Medication 12.5 MG: at 08:29

## 2021-07-23 RX ADMIN — PANTOPRAZOLE SODIUM 40 MG: 40 TABLET, DELAYED RELEASE ORAL at 06:21

## 2021-07-23 RX ADMIN — FUROSEMIDE 80 MG: 80 TABLET ORAL at 17:31

## 2021-07-23 NOTE — PAYOR COMM NOTE
"Sanya Dahl (41 y.o. Male)     Date of Birth Social Security Number Address Home Phone MRN    1980  [address]  [facility] [address] [phone] [id number]    Christian Marital Status          Orthodoxy Single       Admission Date Admission Type Admitting Provider Attending Provider Department, Room/Bed    7/20/21 Emergency Alejandro Brunson MD Craig, David A, MD [address], 327/1    Discharge Date Discharge Disposition Discharge Destination                       Attending Provider: Alejandro Brunson MD    Allergies: Chantix [Varenicline]    Isolation: None   Infection: None   Code Status: CPR    Ht: 180.3 cm (71\")   Wt: 83.6 kg (184 lb 3.2 oz)    Admission Cmt: None   Principal Problem: None                Active Insurance as of 7/20/2021     Primary Coverage     Payor Plan Insurance Group Employer/Plan Group    [employer] [place] [id number]     Payor Plan Address Payor Plan Phone Number Payor Plan Fax Number Effective Dates    [address] [address] [phone]  10/28/2017 - None Entered    [facility] [id number]       Subscriber Name Subscriber Birth Date Member ID       SANYA DAHL 1980 [id number]                 Emergency Contacts      (Rel.) Home Phone Work Phone Mobile Phone    doug holland (Father) [phone] -- [phone]    Winifred Canales (Significant Other) [phone] -- --            Vital Signs (last day)     Date/Time   Temp   Temp src   Pulse   Resp   BP   Patient Position   SpO2    07/23/21 0712   97.2 (36.2)   Oral   77   18   99/62   Lying   98    07/23/21 0620   --   --   --   --   101/67   --   --    07/23/21 0320   96.1 (35.6)   Oral   78   18   100/70   Sitting   95    07/22/21 2342   --   --   80   --   --   --   --    07/22/21 2340   --   --   --   --   98/58   Sitting   --    07/22/21 2304   96.2 (35.7)   Oral   73   18   99/58   Lying   99    07/22/21 1920   96 (35.6)   Oral   83   18   106/67   Sitting   99 "    07/22/21 1604   --   --   76   --   --   --   --    07/22/21 1534   97.7 (36.5)   Oral   77   18   100/58   Lying   99    07/22/21 1127   --   --   93   18   93/68   Lying   98    07/22/21 0736   --   --   88   --   --   --   --    07/22/21 0727   96.9 (36.1)   Temporal   87   18   112/72   Lying   99    07/22/21 0344   96.5 (35.8)   Oral   89   18   102/61   Lying   95    07/22/21 0014   --   --   88   --   --   --   --    07/22/21 0010   96.1 (35.6)   Oral   78   18   100/59   Lying   99              Oxygen Therapy (last day)     Date/Time   SpO2   Device (Oxygen Therapy)   Flow (L/min)   Oxygen Concentration (%)   ETCO2 (mmHg)    07/23/21 0712   98   --   --   --   --    07/23/21 0320   95   room air   --   --   --    07/22/21 2304   99   room air   --   --   --    07/22/21 1920   99   room air   --   --   --    07/22/21 1534   99   room air   --   --   --    07/22/21 1127   98   room air   --   --   --    07/22/21 0835   --   room air   --   --   --    07/22/21 0727   99   room air   --   --   --    07/22/21 0344   95   room air   --   --   --    07/22/21 0010   99   room air   --   --   --              Intake & Output (last day)       07/22 0701 - 07/23 0700 07/23 0701 - 07/24 0700    P.O. 1560 360    IV Piggyback      Total Intake(mL/kg) 1560 (18.7) 360 (4.3)    Urine (mL/kg/hr) 3200 (1.6)     Stool      Total Output 3200     Net -1640 +360          Urine Unmeasured Occurrence 1 x 1 x        Lines, Drains & Airways    Active LDAs     Name:   Placement date:   Placement time:   Site:   Days:    Peripheral IV 07/20/21 1227 Left Antecubital   07/20/21    1227    Antecubital   2                Current Facility-Administered Medications   Medication Dose Route Frequency Provider Last Rate Last Admin   • acetaminophen (TYLENOL) tablet 650 mg  650 mg Oral Q4H PRN Alejandro Brunson MD        Or   • acetaminophen (TYLENOL) suppository 650 mg  650 mg Rectal Q4H PRN Alejandro Brunson MD       • albuterol (PROVENTIL)  nebulizer solution 0.083% 2.5 mg/3mL  2.5 mg Nebulization Q4H PRN Alejandro Brunson MD       • apixaban (ELIQUIS) tablet 5 mg  5 mg Oral Q12H Alejandro Brunson MD   5 mg at 07/23/21 0824   • calcium carbonate (TUMS) chewable tablet 500 mg (200 mg elemental)  2 tablet Oral BID PRN Alejandro Brunson MD       • carvedilol (COREG) tablet 3.125 mg  3.125 mg Oral BID With Meals Owen Perez MD   3.125 mg at 07/23/21 0824   • digoxin (LANOXIN) tablet 250 mcg  250 mcg Oral Daily Alejandro Brunson MD   250 mcg at 07/22/21 1154   • furosemide (LASIX) tablet 80 mg  80 mg Oral BID Alejandro Brunson MD   80 mg at 07/23/21 0621   • magnesium oxide (MAG-OX) tablet 400 mg  400 mg Oral BID Alejandro Brunson MD   400 mg at 07/23/21 0824   • Magnesium Sulfate 2 gram Bolus, followed by 8 gram infusion (total Mg dose 10 grams)- Mg less than or equal to 1mg/dL  2 g Intravenous PRN Alejandro Brunson MD        Or   • Magnesium Sulfate 2 gram / 50mL Infusion (GIVE X 3 BAGS TO EQUAL 6GM TOTAL DOSE) - Mg 1.1 - 1.5 mg/dl  2 g Intravenous PRN Alejandro Brunson MD        Or   • Magnesium Sulfate 4 gram infusion- Mg 1.6-1.9 mg/dL  4 g Intravenous PRN Alejandro Brunson MD       • ondansetron (ZOFRAN) tablet 4 mg  4 mg Oral Q6H PRN Alejandro Brunson MD        Or   • ondansetron (ZOFRAN) injection 4 mg  4 mg Intravenous Q6H PRN Alejandro Brunson MD       • pantoprazole (PROTONIX) EC tablet 40 mg  40 mg Oral Q AM Alejandro Brunson MD   40 mg at 07/23/21 0621   • PATIENT SUPPLIED MEDICATION  70 mg Oral Daily LevillYissel, APRN       • potassium chloride (KLOR-CON) packet 40 mEq  40 mEq Oral PRN Alejandro Brunson MD       • potassium chloride (MICRO-K) CR capsule 40 mEq  40 mEq Oral PRN Alejandro Brunson MD       • sacubitril-valsartan (ENTRESTO) 24-26 MG tablet 0.5 tablet  0.5 tablet Oral Q12H Owen Perez MD   0.5 tablet at 07/23/21 0830   • sodium chloride 0.9 % flush 10 mL  10 mL Intravenous PRN Brenna Parrish, MARCIE       • sodium chloride 0.9 % flush 10 mL   10 mL Intravenous Q12H Alejandro Brunson MD   10 mL at 07/23/21 0829   • sodium chloride 0.9 % flush 10 mL  10 mL Intravenous PRN Alejandro Brunson MD       • spironolactone (ALDACTONE) half tablet 12.5 mg  12.5 mg Oral Daily Alejandro Brunson MD   12.5 mg at 07/23/21 0829   • zolpidem (AMBIEN) tablet 10 mg  10 mg Oral Nightly PRN Alejandro Brunson MD   10 mg at 07/23/21 0147        Physician Progress Notes (last 24 hours) (Notes from 07/22/21 1010 through 07/23/21 1010)      Owen Perez MD at 07/22/21 1853          Cardiology Progress Note     LOS: 1 day   Patient Care Team:  Joslyn Rousseau APRN as PCP - General (Nurse Practitioner)    Subjective:  Chart reviewed. Patient seen and examined. Patient symptoms of shortness of breath is improved.  Patient has not had any recurrent symptoms of chest pain.  Patient is tolerating IV dobutamine.  Patient has had episodes of low blood pressure though patient has not complain of having any symptoms of lightheaded dizziness.  Patient would be continued on the present IV dobutamine until the bag empties.  Patient does have chronic low blood pressure and would consider decreasing the dose of the Entresto and a lower dose of a beta-blocker.      Objective:  Temp:  [96.1 °F (35.6 °C)-97.7 °F (36.5 °C)] 97.7 °F (36.5 °C)  Heart Rate:  [76-93] 76  Resp:  [18] 18  BP: ()/(57-72) 100/58    Intake/Output Summary (Last 24 hours) at 7/22/2021 1853  Last data filed at 7/22/2021 1800  Gross per 24 hour   Intake 1200 ml   Output 1400 ml   Net -200 ml       Physical Exam:   General Appearance:    Alert, oriented, cooperative, in no acute distress.   Head:    Normocephalic, atraumatic, without obvious abnormality   Eyes:             SHRUTI. Lids and lashes normal, conjunctivae and sclerae normal, no icterus, no pallor.   Ears:    Ears appear intact with no abnormalities noted.   Throat:   Mucous membranes pink and moist.   Neck:  Supple, trachea midline, no carotid bruit, no  organomegaly or JVD.   Lungs:    Clear to auscultation and percussion.  Respirations regular, even and unlabored. No wheezes, rales, or rhonchi.    Heart:    Regular rhythm and normal rate, normal S1 and S2, no      murmur, no gallop, no rub, no click.   Abdomen:    Soft, nontender, nondistended, no guarding, no rebound tenderness. Normal bowel sounds in all four quadrants, no masses, liver and spleen nonpalpable.    Genitalia:    Deferred.   Extremities:   Moves all extremities well, no edema, no cyanosis, no       redness, no clubbing.   Pulses:   Pulses palpable and equal bilaterally.   Skin:   Moist and warm. No bleeding, bruising or rash.   Neurologic/Psychiatric:   Alert and oriented to person, place, and time.  Motor, power and tone in upper and lower extremities are grossly intact.  No focal neurological deficits. Normal cognitive function. No psychomotor reaction or tangential thought. No depression, homicidal ideations and suicidal ideations.          Results Review:    Results from last 7 days   Lab Units 07/22/21  0620   SODIUM mmol/L 133*   POTASSIUM mmol/L 3.9   CHLORIDE mmol/L 98   CO2 mmol/L 26.0   BUN mg/dL 16   CREATININE mg/dL 0.87   CALCIUM mg/dL 8.5*   BILIRUBIN mg/dL 1.1   ALK PHOS U/L 108   ALT (SGPT) U/L 20   AST (SGOT) U/L 29   GLUCOSE mg/dL 111*     Results from last 7 days   Lab Units 07/20/21  1625 07/20/21  1233   TROPONIN T ng/mL 0.010 0.013         Results from last 7 days   Lab Units 07/21/21  0705   WBC 10*3/mm3 3.94   HEMOGLOBIN g/dL 11.8*   HEMATOCRIT % 37.2*   PLATELETS 10*3/mm3 169     Results from last 7 days   Lab Units 07/20/21  1306   INR  1.39*   APTT seconds 31.5                       ECHO:  Results for orders placed during the hospital encounter of 07/23/20    Adult Transthoracic Echo Limited W/ Cont if Necessary Per Protocol    Interpretation Summary  · Left atrial cavity size is moderate-to-severely dilated.  · Mild mitral valve regurgitation is present  · Mild to  moderate tricuspid valve regurgitation is present.  · The left ventricular cavity is mildly dilated.  · Mildly reduced right ventricular systolic function noted.  · The following left ventricular wall segments are hypokinetic: mid anterior, apical anterior, basal anterolateral, mid anterolateral, apical lateral, basal inferolateral, mid inferolateral, apical inferior, mid inferior, apical septal, basal inferoseptal, mid inferoseptal, apex hypokinetic, mid anteroseptal, basal anterior, basal inferior and basal inferoseptal.      ECG 12 Lead   Final Result   Test Reason : SOA   Blood Pressure :   */*   mmHG   Vent. Rate :  83 BPM     Atrial Rate :  83 BPM      P-R Int : 180 ms          QRS Dur : 116 ms       QT Int : 352 ms       P-R-T Axes :  40 -38 114 degrees      QTc Int : 413 ms      Normal sinus rhythm   Nonspecific intraventricular conduction delay   Possible Left atrial enlargement   Left axis deviation   Left ventricular hypertrophy with QRS widening and repolarization    abnormality   Cannot rule out Septal infarct , age undetermined   Abnormal ECG   When compared with ECG of 30-DEC-2020 20:15,   Minimal criteria for Septal infarct are now Present      Referred By: ERDR           Confirmed By: REENA RYAN      SCANNED EKG   Final Result           Medication Review:   Current Facility-Administered Medications   Medication Dose Route Frequency Provider Last Rate Last Admin   • acetaminophen (TYLENOL) tablet 650 mg  650 mg Oral Q4H PRN Alejandro Brunson MD        Or   • acetaminophen (TYLENOL) suppository 650 mg  650 mg Rectal Q4H PRN Alejandro Brunson MD       • albuterol (PROVENTIL) nebulizer solution 0.083% 2.5 mg/3mL  2.5 mg Nebulization Q4H PRN Alejandro Brunson MD       • apixaban (ELIQUIS) tablet 5 mg  5 mg Oral Q12H Alejandro Brunson MD   5 mg at 07/22/21 0854   • calcium carbonate (TUMS) chewable tablet 500 mg (200 mg elemental)  2 tablet Oral BID PRN Alejandro Brunson MD       • digoxin (LANOXIN) tablet 250 mcg   250 mcg Oral Daily Alejandro Brunson MD   250 mcg at 07/22/21 1154   • DOBUTamine (DOBUTREX) 1 mg/mL infusion  8 mcg/kg/min Intravenous Continuous Alejandro Brunson MD 39.1 mL/hr at 07/22/21 1226 8 mcg/kg/min at 07/22/21 1226   • furosemide (LASIX) tablet 80 mg  80 mg Oral BID Alejandro Brunson MD   80 mg at 07/22/21 1609   • magnesium oxide (MAG-OX) tablet 400 mg  400 mg Oral BID Alejandro Brunson MD   400 mg at 07/22/21 0854   • Magnesium Sulfate 2 gram Bolus, followed by 8 gram infusion (total Mg dose 10 grams)- Mg less than or equal to 1mg/dL  2 g Intravenous PRN Alejandro Brunson MD        Or   • Magnesium Sulfate 2 gram / 50mL Infusion (GIVE X 3 BAGS TO EQUAL 6GM TOTAL DOSE) - Mg 1.1 - 1.5 mg/dl  2 g Intravenous PRN Alejandro Brunson MD        Or   • Magnesium Sulfate 4 gram infusion- Mg 1.6-1.9 mg/dL  4 g Intravenous PRN Alejandro Brunson MD       • metoprolol tartrate (LOPRESSOR) half tablet 12.5 mg  12.5 mg Oral Nightly Alejandro Brunson MD   12.5 mg at 07/20/21 2203   • ondansetron (ZOFRAN) tablet 4 mg  4 mg Oral Q6H PRN Alejandro Brunson MD        Or   • ondansetron (ZOFRAN) injection 4 mg  4 mg Intravenous Q6H PRN Alejandro Brunson MD       • pantoprazole (PROTONIX) EC tablet 40 mg  40 mg Oral Q AM Alejandro Brunson MD   40 mg at 07/22/21 0606   • PATIENT SUPPLIED MEDICATION  70 mg Oral Daily LevillYissel, APRN       • potassium chloride (KLOR-CON) packet 40 mEq  40 mEq Oral PRN Alejandro Brunson MD       • potassium chloride (MICRO-K) CR capsule 40 mEq  40 mEq Oral PRN Alejandro Brunson MD       • sacubitril-valsartan (ENTRESTO) 24-26 MG tablet 1 tablet  1 tablet Oral Q12H Alejandro Brunson MD   1 tablet at 07/22/21 0854   • sodium chloride 0.9 % flush 10 mL  10 mL Intravenous PRN Brenna Parrish APRN       • sodium chloride 0.9 % flush 10 mL  10 mL Intravenous Q12H Alejandro Brunson MD   10 mL at 07/21/21 2020   • sodium chloride 0.9 % flush 10 mL  10 mL Intravenous PRN Alejandro Brunson MD       • spironolactone  (ALDACTONE) half tablet 12.5 mg  12.5 mg Oral Daily Alejandro Brunson MD   12.5 mg at 07/22/21 0854   • zolpidem (AMBIEN) tablet 10 mg  10 mg Oral Nightly PRN Alejandro Brunson MD   10 mg at 07/22/21 0036       Assessment and Plan:      Systolic congestive heart failure (CMS/HCC)  1.  Nonischemic cardiomyopathy with severe left ventricular systolic dysfunction.  Clinically at the present time patient is not compensated chronic congestive heart failure.  Patient would be continued on IV dobutamine until the bag empties.  Patient would be continued on the present dose of the Lasix.  Patient Entresto dose has been decreased secondary to the patient low blood pressure.  Patient has been ambulating without any symptoms of shortness of breath or lightheaded dizziness.  Patient has a follow-up appointment with the [facility] center at [facility].  And would be continued on the present dose of the Lasix and Aldactone.    2.  Chest pain.  Patient had undergone previous coronary angiogram which had not reveal of any evidence of any obstructive epicardial coronary artery disease.  Patient had negative troponin.  Patient at the present time would not be subjected to any invasive evaluation and would be treated medically.    3.  LV thrombus with atrial septal defect.  Patient is on anticoagulation with Eliquis.  Patient has not had any episodes of any bleeding diastasis.  Patient hemoglobin has remained stable.    4.  Valvular insufficiency.  Patient has moderate tricuspid regurgitation and mild to moderate mitral regurgitation.  Patient is on Entresto and Lasix and Aldactone.    5.  Obstructive sleep apnea.  Patient has been recommended to be compliant with the CPAP.    6.  S/p [place] single-chamber AICD placement for nonischemic cardiomyopathy.  Patient AICD interrogation had revealed episodes of ventricular tachycardia with appropriate delivery of shock.  Patient is currently on amiodarone.  Patient has not had  any further recurrence of any arrhythmia.    The above plan of management were discussed with the patient.            Electronically signed by Owen Perez MD, 07/22/21, 6:53 PM CDT.      Time: Time spent on face-to-face interaction 20-minute    Dictated utilizing Dragon dictation.       Electronically signed by Owen Perez MD at 07/23/21 0615     Aracelis Booth MD at 07/22/21 1850              [facility] Medicine Services  INPATIENT PROGRESS NOTE    Length of Stay: 1  Date of Admission: 7/20/2021  Primary Care Physician: Joslyn Rousseau APRN    Subjective   Chief Complaint: Fatigue  HPI:   No new problems today. Says he has not slept much in the past few days and is feeling very weak and tired. Attributes some of this to lack of home medication vyvanse. Denies chest pain but continues with chest heaviness of shortness of breath with minimal exertion.   States he is wearing two pairs of socks to aid his LE edema. This is improved over last 1-2 days.     Review of Systems     All pertinent negatives and positives are as above. All other systems have been reviewed and are negative unless otherwise stated.     Objective    Temp:  [96.1 °F (35.6 °C)-97.7 °F (36.5 °C)] 97.7 °F (36.5 °C)  Heart Rate:  [76-93] 76  Resp:  [18] 18  BP: ()/(57-72) 100/58    Physical Exam  Vitals and nursing note reviewed.   Constitutional:       General: He is not in acute distress.     Appearance: Normal appearance.   Cardiovascular:      Rate and Rhythm: Normal rate and regular rhythm.      Pulses: Normal pulses.      Heart sounds: Normal heart sounds.   Pulmonary:      Effort: Pulmonary effort is normal. No respiratory distress.      Comments: Poor inspiratory effort, limited bibasilar air exchange otherwise clear.   Musculoskeletal:      Right lower leg: No edema.      Left lower leg: No edema.   Skin:     General: Skin is warm and dry.   Neurological:      General: No focal  deficit present.      Mental Status: He is alert and oriented to person, place, and time.   Psychiatric:         Mood and Affect: Mood normal.         Behavior: Behavior normal.             Results Review:  I have reviewed the labs, radiology results, and diagnostic studies.    Laboratory Data:   Results from last 7 days   Lab Units 07/22/21  0620 07/21/21  0705 07/20/21  1233   SODIUM mmol/L 133* 134* 135*   POTASSIUM mmol/L 3.9 3.8 4.2   CHLORIDE mmol/L 98 97* 98   CO2 mmol/L 26.0 27.0 25.0   BUN mg/dL 16 20 24*   CREATININE mg/dL 0.87 0.94 1.09   GLUCOSE mg/dL 111* 112* 102*   CALCIUM mg/dL 8.5* 9.0 9.4   BILIRUBIN mg/dL 1.1 1.4* 2.0*   ALK PHOS U/L 108 110 110   ALT (SGPT) U/L 20 19 21   AST (SGOT) U/L 29 27 30   ANION GAP mmol/L 9.0 10.0 12.0     Estimated Creatinine Clearance: 134 mL/min (by C-G formula based on SCr of 0.87 mg/dL).          Results from last 7 days   Lab Units 07/21/21  0705 07/20/21  1233   WBC 10*3/mm3 3.94 4.83   HEMOGLOBIN g/dL 11.8* 12.2*   HEMATOCRIT % 37.2* 39.0   PLATELETS 10*3/mm3 169 206     Results from last 7 days   Lab Units 07/20/21  1306   INR  1.39*       Culture Data:   No results found for: BLOODCX  No results found for: URINECX  No results found for: RESPCX  No results found for: WOUNDCX  No results found for: STOOLCX  No components found for: BODYFLD    Radiology Data:   Imaging Results (Last 24 Hours)     ** No results found for the last 24 hours. **          I have reviewed the patient's current medications.     Assessment/Plan         Systolic congestive heart failure (CMS/HCC)-Nonischemic cardiomyopathy with severe left ventricular systolic dysfunction and EF 15% with marked hypotension. Cardiology notes reviewed. Continue dobutamine infusion for now. May need to consider holding Entresto. Continue metoprolol for now. AICD has been interrogated and is functional. Continue cardiac monitoring. Weight actually up 2# since admission, will follow volume status closely.            Discharge Planning: I expect patient to be discharged in 2-3 days.     Aracelis Booth MD      I confirmed that the patient's Advance Care Plan is present, code status is documented, or surrogate decision maker is listed in the patient's medical record.     Electronically signed by Aracelis Booth MD at 07/22/21 1900       Consult Notes (last 24 hours) (Notes from 07/22/21 1010 through 07/23/21 1010)    No notes of this type exist for this encounter.       Tete Taylor RN  [facility]  [phone]  Fax [phone]\

## 2021-07-23 NOTE — PROGRESS NOTES
"    [facility] Medicine Services  INPATIENT PROGRESS NOTE    Length of Stay: 2  Date of Admission: 7/20/2021  Primary Care Physician: Joslyn Rousseau APRN    Subjective   Chief Complaint: Fatigue  HPI:   No new problems today. Continues to feel tired. Says he had an episode of feeling \"sweaty\" earlier this morning. No chest pain. Shortness of breath is worse with exertion-approaching baseline.     Review of Systems     All pertinent negatives and positives are as above. All other systems have been reviewed and are negative unless otherwise stated.     Objective    Temp:  [96 °F (35.6 °C)-97.2 °F (36.2 °C)] 97.2 °F (36.2 °C)  Heart Rate:  [] 70  Resp:  [18] 18  BP: ()/(58-70) 97/61    Physical Exam  Vitals and nursing note reviewed.   Constitutional:       General: He is not in acute distress.     Appearance: Normal appearance.   Cardiovascular:      Rate and Rhythm: Normal rate and regular rhythm.      Pulses: Normal pulses.      Heart sounds: Normal heart sounds.   Pulmonary:      Effort: Pulmonary effort is normal. No respiratory distress.      Comments: Poor inspiratory effort, limited bibasilar air exchange otherwise clear.   Musculoskeletal:      Right lower leg: No edema.      Left lower leg: No edema.   Skin:     General: Skin is warm and dry.   Neurological:      General: No focal deficit present.      Mental Status: He is alert and oriented to person, place, and time.   Psychiatric:         Mood and Affect: Mood normal.         Behavior: Behavior normal.             Results Review:  I have reviewed the labs, radiology results, and diagnostic studies.    Laboratory Data:   Results from last 7 days   Lab Units 07/23/21  0521 07/22/21  0620 07/21/21  0705   SODIUM mmol/L 131* 133* 134*   POTASSIUM mmol/L 4.6 3.9 3.8   CHLORIDE mmol/L 97* 98 97*   CO2 mmol/L 25.0 26.0 27.0   BUN mg/dL 23* 16 20   CREATININE mg/dL 0.93 0.87 0.94   GLUCOSE mg/dL 95 111* 112* "   CALCIUM mg/dL 8.8 8.5* 9.0   BILIRUBIN mg/dL 1.1 1.1 1.4*   ALK PHOS U/L 110 108 110   ALT (SGPT) U/L 23 20 19   AST (SGOT) U/L 34 29 27   ANION GAP mmol/L 9.0 9.0 10.0     Estimated Creatinine Clearance: 126.1 mL/min (by C-G formula based on SCr of 0.93 mg/dL).  Results from last 7 days   Lab Units 07/23/21  1044   MAGNESIUM mg/dL 2.4         Results from last 7 days   Lab Units 07/23/21  0521 07/21/21  0705 07/20/21  1233   WBC 10*3/mm3 3.96 3.94 4.83   HEMOGLOBIN g/dL 11.6* 11.8* 12.2*   HEMATOCRIT % 35.5* 37.2* 39.0   PLATELETS 10*3/mm3 169 169 206     Results from last 7 days   Lab Units 07/20/21  1306   INR  1.39*       Culture Data:   No results found for: BLOODCX  No results found for: URINECX  No results found for: RESPCX  No results found for: WOUNDCX  No results found for: STOOLCX  No components found for: BODYFLD    Radiology Data:   Imaging Results (Last 24 Hours)     ** No results found for the last 24 hours. **          I have reviewed the patient's current medications.     Assessment/Plan         Systolic congestive heart failure (CMS/HCC)-Nonischemic cardiomyopathy with severe left ventricular systolic dysfunction and EF 15% with marked hypotension. Cardiology notes reviewed. Dobutamine discontinued earlier this morning. BP is low but he remains stable. Continue metoprolol and entresto per cardiology recommendations. AICD has been interrogated and is functional. Continue cardiac monitoring. Weight actually up 2# since admission, will follow volume status closely.           Discharge Planning: I expect patient to be discharged in 2-3 days.     Aracelis Booth MD      I confirmed that the patient's Advance Care Plan is present, code status is documented, or surrogate decision maker is listed in the patient's medical record.

## 2021-07-23 NOTE — PLAN OF CARE
Goal Outcome Evaluation:  Plan of Care Reviewed With: patient        Progress: no change  VSS, pt adhering to fluid restrictions, medications changed per Dr. Perez

## 2021-07-24 VITALS
TEMPERATURE: 96.8 F | WEIGHT: 185.4 LBS | HEART RATE: 72 BPM | DIASTOLIC BLOOD PRESSURE: 58 MMHG | RESPIRATION RATE: 18 BRPM | HEIGHT: 71 IN | SYSTOLIC BLOOD PRESSURE: 102 MMHG | BODY MASS INDEX: 25.96 KG/M2 | OXYGEN SATURATION: 95 %

## 2021-07-24 LAB
ALBUMIN SERPL-MCNC: 3.7 G/DL (ref 3.5–5.2)
ALBUMIN/GLOB SERPL: 1.4 G/DL
ALP SERPL-CCNC: 103 U/L (ref 39–117)
ALT SERPL W P-5'-P-CCNC: 30 U/L (ref 1–41)
ANION GAP SERPL CALCULATED.3IONS-SCNC: 10 MMOL/L (ref 5–15)
AST SERPL-CCNC: 41 U/L (ref 1–40)
BILIRUB SERPL-MCNC: 1.1 MG/DL (ref 0–1.2)
BUN SERPL-MCNC: 26 MG/DL (ref 6–20)
BUN/CREAT SERPL: 27.7 (ref 7–25)
CALCIUM SPEC-SCNC: 8.5 MG/DL (ref 8.6–10.5)
CHLORIDE SERPL-SCNC: 99 MMOL/L (ref 98–107)
CO2 SERPL-SCNC: 23 MMOL/L (ref 22–29)
CREAT SERPL-MCNC: 0.94 MG/DL (ref 0.76–1.27)
GFR SERPL CREATININE-BSD FRML MDRD: 89 ML/MIN/1.73
GLOBULIN UR ELPH-MCNC: 2.6 GM/DL
GLUCOSE SERPL-MCNC: 89 MG/DL (ref 65–99)
MAGNESIUM SERPL-MCNC: 2.4 MG/DL (ref 1.6–2.6)
POTASSIUM SERPL-SCNC: 4 MMOL/L (ref 3.5–5.2)
PROT SERPL-MCNC: 6.3 G/DL (ref 6–8.5)
SODIUM SERPL-SCNC: 132 MMOL/L (ref 136–145)
WHOLE BLOOD HOLD SPECIMEN: NORMAL

## 2021-07-24 PROCEDURE — 94799 UNLISTED PULMONARY SVC/PX: CPT

## 2021-07-24 PROCEDURE — 63710000001 ONDANSETRON PER 8 MG: Performed by: FAMILY MEDICINE

## 2021-07-24 PROCEDURE — 80053 COMPREHEN METABOLIC PANEL: CPT | Performed by: FAMILY MEDICINE

## 2021-07-24 PROCEDURE — 94760 N-INVAS EAR/PLS OXIMETRY 1: CPT

## 2021-07-24 PROCEDURE — 83735 ASSAY OF MAGNESIUM: CPT | Performed by: FAMILY MEDICINE

## 2021-07-24 RX ORDER — CARVEDILOL 3.12 MG/1
3.12 TABLET ORAL 2 TIMES DAILY WITH MEALS
Qty: 60 TABLET | Refills: 3 | Status: SHIPPED | OUTPATIENT
Start: 2021-07-24 | End: 2022-01-31

## 2021-07-24 RX ADMIN — FUROSEMIDE 80 MG: 80 TABLET ORAL at 05:32

## 2021-07-24 RX ADMIN — SODIUM CHLORIDE, PRESERVATIVE FREE 10 ML: 5 INJECTION INTRAVENOUS at 08:14

## 2021-07-24 RX ADMIN — APIXABAN 5 MG: 5 TABLET, FILM COATED ORAL at 08:12

## 2021-07-24 RX ADMIN — Medication 12.5 MG: at 08:11

## 2021-07-24 RX ADMIN — Medication 400 MG: at 08:14

## 2021-07-24 RX ADMIN — ZOLPIDEM TARTRATE 10 MG: 5 TABLET ORAL at 01:23

## 2021-07-24 RX ADMIN — CARVEDILOL 3.12 MG: 3.12 TABLET, FILM COATED ORAL at 08:11

## 2021-07-24 RX ADMIN — SACUBITRIL AND VALSARTAN 0.5 TABLET: 24; 26 TABLET, FILM COATED ORAL at 08:12

## 2021-07-24 RX ADMIN — ONDANSETRON HYDROCHLORIDE 4 MG: 4 TABLET, FILM COATED ORAL at 08:10

## 2021-07-24 RX ADMIN — CALCIUM CARBONATE (ANTACID) CHEW TAB 500 MG 2 TABLET: 500 CHEW TAB at 08:10

## 2021-07-24 RX ADMIN — PANTOPRAZOLE SODIUM 40 MG: 40 TABLET, DELAYED RELEASE ORAL at 05:33

## 2021-07-24 NOTE — DISCHARGE SUMMARY
[facility] Medicine Services  DISCHARGE SUMMARY       Date of Admission: 7/20/2021  Date of Discharge:  7/24/2021  Primary Care Physician: Joslyn Rousseau APRN    Presenting Problem/History of Present Illness:  Chest pain, unspecified type [R07.9]  Systolic congestive heart failure, unspecified HF chronicity (CMS/HCC) [I50.20]       Final Discharge Diagnoses:  Active Hospital Problems    Diagnosis    • Systolic congestive heart failure (CMS/HCC)        Consults:   Consults     Date and Time Order Name Status Description    7/21/2021  4:49 PM Inpatient Cardiology Consult      7/20/2021  5:31 PM Inpatient Cardiology Consult Completed           Procedures Performed:                 Pertinent Test Results:   Lab Results (most recent)     Procedure Component Value Units Date/Time    Extra Tubes [[id number]] Collected: 07/24/21 0628    Specimen: Blood, Venous Line Updated: 07/24/21 0731    Narrative:      The following orders were created for panel order Extra Tubes.  Procedure                               Abnormality         Status                     ---------                               -----------         ------                     Lavender Top[[id number]]                                     Final result                 Please view results for these tests on the individual orders.    Lavender Top [[id number]] Collected: 07/24/21 0628    Specimen: Blood Updated: 07/24/21 0731     Extra Tube hold for add-on     Comment: Auto resulted       Comprehensive Metabolic Panel [[id number]]  (Abnormal) Collected: 07/24/21 0627    Specimen: Blood Updated: 07/24/21 0723     Glucose 89 mg/dL      BUN 26 mg/dL      Creatinine 0.94 mg/dL      Sodium 132 mmol/L      Potassium 4.0 mmol/L      Chloride 99 mmol/L      CO2 23.0 mmol/L      Calcium 8.5 mg/dL      Total Protein 6.3 g/dL      Albumin 3.70 g/dL      ALT (SGPT) 30 U/L      AST (SGOT) 41 U/L      Alkaline Phosphatase 103 U/L       Total Bilirubin 1.1 mg/dL      eGFR Non African Amer 89 mL/min/1.73      Globulin 2.6 gm/dL      A/G Ratio 1.4 g/dL      BUN/Creatinine Ratio 27.7     Anion Gap 10.0 mmol/L     Narrative:      GFR Normal >60  Chronic Kidney Disease <60  Kidney Failure <15      Magnesium [[id number]]  (Normal) Collected: 07/24/21 0627    Specimen: Blood Updated: 07/24/21 0723     Magnesium 2.4 mg/dL     Magnesium [[id number]]  (Normal) Collected: 07/23/21 1044    Specimen: Blood Updated: 07/23/21 1105     Magnesium 2.4 mg/dL     Comprehensive Metabolic Panel [[id number]]  (Abnormal) Collected: 07/23/21 0521    Specimen: Blood Updated: 07/23/21 0615     Glucose 95 mg/dL      BUN 23 mg/dL      Creatinine 0.93 mg/dL      Sodium 131 mmol/L      Potassium 4.6 mmol/L      Chloride 97 mmol/L      CO2 25.0 mmol/L      Calcium 8.8 mg/dL      Total Protein 6.6 g/dL      Albumin 3.90 g/dL      ALT (SGPT) 23 U/L      AST (SGOT) 34 U/L      Alkaline Phosphatase 110 U/L      Total Bilirubin 1.1 mg/dL      eGFR Non African Amer 90 mL/min/1.73      Globulin 2.7 gm/dL      A/G Ratio 1.4 g/dL      BUN/Creatinine Ratio 24.7     Anion Gap 9.0 mmol/L     Narrative:      GFR Normal >60  Chronic Kidney Disease <60  Kidney Failure <15      CBC & Differential [[id number]]  (Abnormal) Collected: 07/23/21 0521    Specimen: Blood Updated: 07/23/21 0556    Narrative:      The following orders were created for panel order CBC & Differential.  Procedure                               Abnormality         Status                     ---------                               -----------         ------                     CBC Auto Differential[[id number]]        Abnormal            Final result                 Please view results for these tests on the individual orders.    CBC Auto Differential [[id number]]  (Abnormal) Collected: 07/23/21 0521    Specimen: Blood Updated: 07/23/21 0556     WBC 3.96 10*3/mm3      RBC 3.99 10*6/mm3      Hemoglobin 11.6 g/dL      Hematocrit 35.5 %       MCV 89.0 fL      MCH 29.1 pg      MCHC 32.7 g/dL      RDW 19.0 %      RDW-SD 59.5 fl      MPV 10.4 fL      Platelets 169 10*3/mm3      Neutrophil % 63.6 %      Lymphocyte % 22.7 %      Monocyte % 11.4 %      Eosinophil % 1.5 %      Basophil % 0.8 %      Immature Grans % 0.0 %      Neutrophils, Absolute 2.52 10*3/mm3      Lymphocytes, Absolute 0.90 10*3/mm3      Monocytes, Absolute 0.45 10*3/mm3      Eosinophils, Absolute 0.06 10*3/mm3      Basophils, Absolute 0.03 10*3/mm3      Immature Grans, Absolute 0.00 10*3/mm3      nRBC 0.0 /100 WBC     CBC & Differential [[id number]]  (Abnormal) Collected: 07/21/21 0705    Specimen: Blood Updated: 07/21/21 0711    Narrative:      The following orders were created for panel order CBC & Differential.  Procedure                               Abnormality         Status                     ---------                               -----------         ------                     CBC Auto Differential[[id number]]        Abnormal            Final result                 Please view results for these tests on the individual orders.    CBC Auto Differential [[id number]]  (Abnormal) Collected: 07/21/21 0705    Specimen: Blood Updated: 07/21/21 0711     WBC 3.94 10*3/mm3      RBC 4.30 10*6/mm3      Hemoglobin 11.8 g/dL      Hematocrit 37.2 %      MCV 86.5 fL      MCH 27.4 pg      MCHC 31.7 g/dL      RDW 18.4 %      RDW-SD 58.1 fl      MPV 10.2 fL      Platelets 169 10*3/mm3      Neutrophil % 55.1 %      Lymphocyte % 30.2 %      Monocyte % 12.4 %      Eosinophil % 1.3 %      Basophil % 1.0 %      Immature Grans % 0.0 %      Neutrophils, Absolute 2.17 10*3/mm3      Lymphocytes, Absolute 1.19 10*3/mm3      Monocytes, Absolute 0.49 10*3/mm3      Eosinophils, Absolute 0.05 10*3/mm3      Basophils, Absolute 0.04 10*3/mm3      Immature Grans, Absolute 0.00 10*3/mm3      nRBC 0.0 /100 WBC     Troponin [[id number]]  (Normal) Collected: 07/20/21 1625    Specimen: Blood Updated: 07/20/21 164      Troponin T 0.010 ng/mL     Narrative:      Troponin T Reference Range:  <= 0.03 ng/mL-   Negative for AMI  >0.03 ng/mL-     Abnormal for myocardial necrosis.  Clinicians would have to utilize clinical acumen, EKG, Troponin and serial changes to determine if it is an Acute Myocardial Infarction or myocardial injury due to an underlying chronic condition.       Results may be falsely decreased if patient taking Biotin.      COVID-19 and FLU A/B PCR - Swab, Nasopharynx [[id number]]  (Normal) Collected: 07/20/21 1509    Specimen: Swab from Nasopharynx Updated: 07/20/21 1544     COVID19 Not Detected     Influenza A PCR Not Detected     Influenza B PCR Not Detected    Narrative:      Fact sheet for providers: https://www.fda.gov/media/219205/download    Fact sheet for patients: https://www.fda.gov/media/368548/download    Test performed by PCR.    Urinalysis With Microscopic If Indicated (No Culture) - Urine, Clean Catch [[id number]]  (Abnormal) Collected: 07/20/21 1441    Specimen: Urine, Clean Catch Updated: 07/20/21 1459     Color, UA Dark Yellow     Appearance, UA Clear     pH, UA 6.0     Specific Gravity, UA 1.020     Glucose, UA Negative     Ketones, UA Negative     Bilirubin, UA Small (1+)     Blood, UA Negative     Protein, UA Negative     Leuk Esterase, UA Negative     Nitrite, UA Negative     Urobilinogen, UA 1.0 E.U./dL    Narrative:      Urine microscopic not indicated.    Urine Drug Screen - Urine, Clean Catch [[id number]]  (Abnormal) Collected: 07/20/21 1441    Specimen: Urine, Clean Catch Updated: 07/20/21 1458     THC, Screen, Urine Negative     Phencyclidine (PCP), Urine Negative     Cocaine Screen, Urine Negative     Methamphetamine, Ur Negative     Opiate Screen Negative     Amphetamine Screen, Urine Positive     Benzodiazepine Screen, Urine Negative     Tricyclic Antidepressants Screen Negative     Methadone Screen, Urine Negative     Barbiturates Screen, Urine Negative     Oxycodone Screen, Urine  Negative     Propoxyphene Screen Negative     Buprenorphine, Screen, Urine Negative    Narrative:      Cutoff For Drugs Screened:    Amphetamines               500 ng/ml  Barbiturates               200 ng/ml  Benzodiazepines            150 ng/ml  Cocaine                    150 ng/ml  Methadone                  200 ng/ml  Opiates                    100 ng/ml  Phencyclidine               25 ng/ml  THC                            50 ng/ml  Methamphetamine            500 ng/ml  Tricyclic Antidepressants  300 ng/ml  Oxycodone                  100 ng/ml  Propoxyphene               300 ng/ml  Buprenorphine               10 ng/ml    The normal value for all drugs tested is negative. This report includes unconfirmed screening results, with the cutoff values listed, to be used for medical treatment purposes only.  Unconfirmed results must not be used for non-medical purposes such as employment or legal testing.  Clinical consideration should be applied to any drug of abuse test, particularly when unconfirmed results are used.      Fulda Draw [[id number]] Collected: 07/20/21 1233    Specimen: Blood Updated: 07/20/21 1345    Narrative:      The following orders were created for panel order Fulda Draw.  Procedure                               Abnormality         Status                     ---------                               -----------         ------                     Green Top (Gel)[[id number]]                                  Final result               Lavender Top[[id number]]                                     Final result               Gold Top - SST[[id number]]                                   Final result                 Please view results for these tests on the individual orders.    Green Top (Gel) [[id number]] Collected: 07/20/21 1233    Specimen: Blood Updated: 07/20/21 1345     Extra Tube Hold for add-ons.     Comment: Auto resulted.       Lavender Top [[id number]] Collected: 07/20/21 1233    Specimen: Blood  Updated: 07/20/21 1345     Extra Tube hold for add-on     Comment: Auto resulted       Gold Top - SST [[id number]] Collected: 07/20/21 1233    Specimen: Blood Updated: 07/20/21 1345     Extra Tube Hold for add-ons.     Comment: Auto resulted.       Troponin [[id number]]  (Normal) Collected: 07/20/21 1233    Specimen: Blood Updated: 07/20/21 1342     Troponin T 0.013 ng/mL     Narrative:      Troponin T Reference Range:  <= 0.03 ng/mL-   Negative for AMI  >0.03 ng/mL-     Abnormal for myocardial necrosis.  Clinicians would have to utilize clinical acumen, EKG, Troponin and serial changes to determine if it is an Acute Myocardial Infarction or myocardial injury due to an underlying chronic condition.       Results may be falsely decreased if patient taking Biotin.      aPTT [[id number]]  (Normal) Collected: 07/20/21 1306    Specimen: Blood Updated: 07/20/21 1333     PTT 31.5 seconds     Narrative:      The recommended Heparin therapeutic range is 68-97 seconds.    Protime-INR [[id number]]  (Abnormal) Collected: 07/20/21 1306    Specimen: Blood Updated: 07/20/21 1333     Protime 16.8 Seconds      INR 1.39    Narrative:      Therapeutic range for most indications is 2.0-3.0 INR,  or 2.5-3.5 for mechanical heart valves.    Digoxin Level [[id number]]  (Normal) Collected: 07/20/21 1233    Specimen: Blood Updated: 07/20/21 1331     Digoxin 0.90 ng/mL     BNP [[id number]]  (Abnormal) Collected: 07/20/21 1233    Specimen: Blood Updated: 07/20/21 1330     proBNP 8,369.0 pg/mL     Narrative:      Among patients with dyspnea, NT-proBNP is highly sensitive for the detection of acute congestive heart failure. In addition NT-proBNP of <300 pg/ml effectively rules out acute congestive heart failure with 99% negative predictive value.    Results may be falsely decreased if patient taking Biotin.          Imaging Results (Most Recent)     Procedure Component Value Units Date/Time    XR Chest 2 View [[id number]] Collected: 07/20/21 1308      Updated: 07/20/21 1336    Narrative:      Chest x-ray PA and lateral           CLINICAL INDICATION: Shortness of breath.     COMPARISON: Chest x-ray December 30, 2020. None    FINDINGS: Cardiac silhouette is enlarged in size. Pulmonary  vascularity is unremarkable.   Pacemaker, AICD lead with tip in right ventricle.  No focal infiltrate or consolidation.  No pleural effusion.  No  pneumothorax.  Minimal discoid fibrotic changes right lung base.    Impression:      Cardiomegaly and pacemaker, AICD lead with tip in  right ventricle. Minimal discoid fibrotic changes right lung  base. Otherwise unremarkable chest.    Electronically signed by:  Yamil Bennett MD  7/20/2021 1:35 PM CDT  Workstation: [id number]          Chief Complaint on Day of Discharge: Fatigue    Hospital Course:  The patient is a 41 y.o. male with past medical history most notable for chronic systolic congestive heart failure with most recent ejection fraction of 15 to 20% and history of pulmonary embolism who presented to [facility] ED on July 20, 2022 with complaints of chest pain and shortness of breath.  Upon initial evaluation the ER he was found to be notably hypotensive.  Cardiology consult was placed and patient was seen by Dr. Perez.  Commendations were made for dobutamine infusion.  Patient was referred to the hospitalist service for admission.  For complete admission history details please see H&P dated July 20 as per Dr. Brunson.  Acute coronary syndrome was excluded.  Recommendations were to continue metoprolol and Entresto and Lasix.  Patient remained largely euvolemic but hypotensive with difficulty weaning dobutamine infusion.  Entresto and beta-blocker dosing were decreased with subsequent discontinuation of Entresto.  Patient now has been off of dobutamine for greater than 24 hours with carvedilol at 3.125 twice daily and diuretic dosing and remained hemodynamically stable with systolic pressures averaging .   "In discussion with Dr. Perez the patient is felt to be stable and at his baseline.  Home medication changes have been reviewed with the patient prior to discharge.  He strongly encouraged to keep his follow-up with both Dr. Perez and [facility] as previously scheduled.  Occasions for more urgent recheck reviewed with patient prior to discharge.  He voices understanding and is in agreement with plan.    Condition on Discharge:  Stable    Physical Exam on Discharge:  BP 90/53 (BP Location: Right arm, Patient Position: Lying)   Pulse 68   Temp 96 °F (35.6 °C) (Oral)   Resp 18   Ht 180.3 cm (71\")   Wt 84.1 kg (185 lb 6.4 oz)   SpO2 92%   BMI 25.86 kg/m²   Physical Exam  Vitals and nursing note reviewed.   Constitutional:       General: He is not in acute distress.     Appearance: Normal appearance.   Cardiovascular:      Rate and Rhythm: Normal rate and regular rhythm.      Pulses: Normal pulses.      Heart sounds: Normal heart sounds.   Pulmonary:      Effort: Pulmonary effort is normal. No respiratory distress.      Comments: Poor inspiratory effort, limited bibasilar air exchange otherwise clear.   Musculoskeletal:      Right lower leg: No edema.      Left lower leg: No edema.   Skin:     General: Skin is warm and dry.   Neurological:      General: No focal deficit present.      Mental Status: He is alert and oriented to person, place, and time.   Psychiatric:         Mood and Affect: Mood normal.         Behavior: Behavior normal.     Discharge Disposition:  Home or Self Care    Discharge Medications:     Discharge Medications      New Medications      Instructions Start Date   carvedilol 3.125 MG tablet  Commonly known as: COREG   3.125 mg, Oral, 2 Times Daily With Meals         Changes to Medications      Instructions Start Date   Magnesium Oxide 400 (240 Mg) MG tablet  What changed: Another medication with the same name was removed. Continue taking this medication, and follow the directions you see " here.   400 mg, Oral, 2 Times Daily         Continue These Medications      Instructions Start Date   albuterol sulfate  (90 Base) MCG/ACT inhaler  Commonly known as: PROVENTIL HFA;VENTOLIN HFA;PROAIR HFA   2 puffs, Inhalation, Every 4 Hours PRN      Ambien 10 MG tablet  Generic drug: zolpidem   10 mg, Oral, Nightly PRN      apixaban 5 MG tablet tablet  Commonly known as: ELIQUIS   5 mg, Oral      bumetanide 1 MG tablet  Commonly known as: BUMEX   No dose, route, or frequency recorded.      Calcium Carbonate Antacid 1250 MG/5ML   Oral      digoxin 250 MCG tablet  Commonly known as: LANOXIN   250 mcg, Oral, Daily Digoxin      furosemide 80 MG tablet  Commonly known as: LASIX   1 tablet, Oral, 2 Times Daily      lidocaine-prilocaine 2.5-2.5 % cream  Commonly known as: EMLA   As Needed      pantoprazole 40 MG EC tablet  Commonly known as: Protonix   40 mg, Oral, Daily      spironolactone 25 MG tablet  Commonly known as: ALDACTONE   12.5 mg, Oral, Daily      vitamin D 1.25 MG (03197 UT) capsule capsule  Commonly known as: ERGOCALCIFEROL   50,000 Units, Oral, Every 7 Days      Vyvanse 70 MG capsule  Generic drug: lisdexamfetamine   70 mg, Oral, Every Morning, Pt takes 1/2 in the morning and 1/2 in the afternoon         Stop These Medications    metoprolol succinate XL 25 MG 24 hr tablet  Commonly known as: TOPROL-XL     metoprolol tartrate 25 MG tablet  Commonly known as: LOPRESSOR     sacubitril-valsartan 24-26 MG tablet  Commonly known as: ENTRESTO            Discharge Diet: Cardiac    Activity at Discharge: As tolerated        Follow-up Appointments:   Future Appointments   Date Time Provider Department Center   8/2/2021  9:25 AM C19 PRE SCREEN MAD  [facility] C19PS [facility]   8/11/2021  1:00 PM Alejandro Hughes, JENIFER [facility]       Test Results Pending at Discharge:     Aracelis Booth MD    Time: greater than 30 minutes spent preparing discharge            The patient has current or prior documentation of LVEF  less than 40%, or moderate to severely depressed left ventricular systolic function. The patient was prescribed or already taking a beta-blocker. ARB and ACEI are not prescribed due to hypotension.

## 2021-07-24 NOTE — PLAN OF CARE
Problem: Adult Inpatient Plan of Care  Goal: Plan of Care Review  Outcome: Ongoing, Progressing  Flowsheets (Taken 7/23/2021 0428 by Natacha Gee RN)  Progress: no change  Plan of Care Reviewed With: patient     Problem: Adult Inpatient Plan of Care  Goal: Patient-Specific Goal (Individualized)  Outcome: Ongoing, Progressing     Problem: Adult Inpatient Plan of Care  Goal: Absence of Hospital-Acquired Illness or Injury  Outcome: Ongoing, Progressing     Problem: Adult Inpatient Plan of Care  Goal: Absence of Hospital-Acquired Illness or Injury  Intervention: Identify and Manage Fall Risk  Flowsheets  Taken 7/24/2021 0000  Safety Promotion/Fall Prevention:   assistive device/personal items within reach   clutter free environment maintained   nonskid shoes/slippers when out of bed   safety round/check completed  Taken 7/23/2021 2200  Safety Promotion/Fall Prevention:   safety round/check completed   nonskid shoes/slippers when out of bed   clutter free environment maintained   assistive device/personal items within reach  Taken 7/23/2021 2100  Safety Promotion/Fall Prevention:   assistive device/personal items within reach   clutter free environment maintained   nonskid shoes/slippers when out of bed   safety round/check completed  Taken 7/23/2021 2000  Safety Promotion/Fall Prevention:   safety round/check completed   nonskid shoes/slippers when out of bed   clutter free environment maintained   assistive device/personal items within reach  Taken 7/23/2021 1900  Safety Promotion/Fall Prevention:   assistive device/personal items within reach   clutter free environment maintained   nonskid shoes/slippers when out of bed   safety round/check completed     Problem: Adult Inpatient Plan of Care  Goal: Absence of Hospital-Acquired Illness or Injury  Intervention: Prevent Skin Injury  Flowsheets  Taken 7/24/2021 0000  Body Position: position changed independently  Taken 7/23/2021 2200  Body Position: position changed  independently  Taken 7/23/2021 2100  Body Position: position changed independently  Taken 7/23/2021 2000  Body Position: position changed independently  Taken 7/23/2021 1900  Body Position: position changed independently     Problem: Adult Inpatient Plan of Care  Goal: Absence of Hospital-Acquired Illness or Injury  Intervention: Prevent and Manage VTE (venous thromboembolism) Risk  Flowsheets  Taken 7/24/2021 0000  VTE Prevention/Management: (pt taking eliquis) --  Taken 7/23/2021 2200  VTE Prevention/Management: (pt taking eliquis) --  Taken 7/23/2021 2100  VTE Prevention/Management: (pt taking eliquis) --  Taken 7/23/2021 2000  VTE Prevention/Management: (pt taking eliquis) --  Taken 7/23/2021 1900  VTE Prevention/Management: (pt taking eliquis) --     Problem: Adult Inpatient Plan of Care  Goal: Optimal Comfort and Wellbeing  Outcome: Ongoing, Progressing     Problem: Adult Inpatient Plan of Care  Goal: Optimal Comfort and Wellbeing  Intervention: Provide Person-Centered Care  Flowsheets (Taken 7/23/2021 2000)  Trust Relationship/Rapport:   care explained   questions encouraged     Problem: Adult Inpatient Plan of Care  Goal: Readiness for Transition of Care  Outcome: Ongoing, Progressing     Problem: Adult Inpatient Plan of Care  Goal: Readiness for Transition of Care  Intervention: Mutually Develop Transition Plan  Flowsheets (Taken 7/22/2021 1115 by Kristin Hall [facility])  Equipment Needed After Discharge: none  Equipment Currently Used at Home: (BP cuff, pulse ox)   cane, straight   shower chair   other (see comments)  Transportation Anticipated: (Andrea) family or friend will provide  Current Discharge Risk: chronically ill  Concerns to be Addressed: denies needs/concerns at this time  Patient/Family Anticipated Services at Transition: none  Patient/Family Anticipates Transition to: home     Problem: Adjustment to Illness (Heart Failure)  Goal: Optimal Coping  Outcome: Ongoing, Progressing     Problem:  Adjustment to Illness (Heart Failure)  Goal: Optimal Coping  Intervention: Support and Optimize Psychosocial Response to Chronic Illness  Flowsheets (Taken 7/23/2021 2000)  Supportive Measures: active listening utilized     Problem: Arrhythmia/Dysrhythmia (Heart Failure)  Goal: Stable Heart Rate and Rhythm  Outcome: Ongoing, Progressing     Problem: Cardiac Output Decreased (Heart Failure)  Goal: Optimal Cardiac Output  Outcome: Ongoing, Progressing     Problem: Cardiac Output Decreased (Heart Failure)  Goal: Optimal Cardiac Output  Intervention: Optimize Cardiac Output  Flowsheets (Taken 7/23/2021 2000)  Environmental Support: calm environment promoted     Problem: Fluid Imbalance (Heart Failure)  Goal: Fluid Balance  Outcome: Ongoing, Progressing     Problem: Functional Ability Impaired (Heart Failure)  Goal: Optimal Functional Ability  Outcome: Ongoing, Progressing     Problem: Functional Ability Impaired (Heart Failure)  Goal: Optimal Functional Ability  Intervention: Optimize Functional Ability  Flowsheets  Taken 7/24/2021 0000  Activity Management: activity adjusted per tolerance  Taken 7/23/2021 2200  Activity Management: activity adjusted per tolerance  Taken 7/23/2021 2100  Activity Management: activity adjusted per tolerance  Taken 7/23/2021 2000  Activity Management: activity adjusted per tolerance  Taken 7/23/2021 1900  Activity Management: activity adjusted per tolerance     Problem: Oral Intake Inadequate (Heart Failure)  Goal: Optimal Nutrition Intake  Outcome: Ongoing, Progressing     Problem: Respiratory Compromise (Heart Failure)  Goal: Effective Oxygenation and Ventilation  Outcome: Ongoing, Progressing     Problem: Respiratory Compromise (Heart Failure)  Goal: Effective Oxygenation and Ventilation  Intervention: Promote Airway Secretion Clearance  Flowsheets (Taken 7/23/2021 2000)  Cough And Deep Breathing: done independently per patient     Problem: Sleep Disordered Breathing (Heart  Failure)  Goal: Effective Breathing Pattern During Sleep  Outcome: Ongoing, Progressing   Goal Outcome Evaluation:

## 2021-07-24 NOTE — PROGRESS NOTES
Cardiology Progress Note     LOS: 2 days   Patient Care Team:  Joslyn Rousseau APRN as PCP - General (Nurse Practitioner)    Subjective:   Chart reviewed. Patient seen and examined. Patient dobutamine was stopped.  Patient complains of having fatigue and not feeling well.  Patient discharge was canceled.  Patient has had episodes of low blood pressure.  Patient is currently on Entresto and carvedilol which could be contributing to the patient's low blood pressure.  Patient has always had low blood pressure on outpatient basis. Patient on questioning denies any symptoms of chest pain. Patient denies any symptoms of shortness of breath.      Objective:  Temp:  [96.1 °F (35.6 °C)-97.2 °F (36.2 °C)] 97.1 °F (36.2 °C)  Heart Rate:  [] 75  Resp:  [16-18] 16  BP: ()/(58-70) 96/64    Intake/Output Summary (Last 24 hours) at 7/23/2021 2017  Last data filed at 7/23/2021 1757  Gross per 24 hour   Intake 1440 ml   Output 1800 ml   Net -360 ml       Physical Exam:   General Appearance:    Alert, oriented, cooperative, in no acute distress.   Head:    Normocephalic, atraumatic, without obvious abnormality   Eyes:             SHRUTI. Lids and lashes normal, conjunctivae and sclerae normal, no icterus, no pallor.   Ears:    Ears appear intact with no abnormalities noted.   Throat:   Mucous membranes pink and moist.   Neck:  Supple, trachea midline, no carotid bruit, no organomegaly or JVD.   Lungs:    Clear to auscultation and percussion.  Respirations regular, even and unlabored. No wheezes, rales, or rhonchi.    Heart:    Regular rhythm and normal rate, normal S1 and S2, no      murmur, no gallop, no rub, no click.   Abdomen:    Soft, nontender, nondistended, no guarding, no rebound tenderness. Normal bowel sounds in all four quadrants, no masses, liver and spleen nonpalpable.    Genitalia:    Deferred.   Extremities:   Moves all extremities well, no edema, no cyanosis, no       redness, no clubbing.   Pulses:    Pulses palpable and equal bilaterally.   Skin:   Moist and warm. No bleeding, bruising or rash.   Neurologic/Psychiatric:   Alert and oriented to person, place, and time.  Motor, power and tone in upper and lower extremities are grossly intact.  No focal neurological deficits. Normal cognitive function. No psychomotor reaction or tangential thought. No depression, homicidal ideations and suicidal ideations.          Results Review:    Results from last 7 days   Lab Units 07/23/21  0521   SODIUM mmol/L 131*   POTASSIUM mmol/L 4.6   CHLORIDE mmol/L 97*   CO2 mmol/L 25.0   BUN mg/dL 23*   CREATININE mg/dL 0.93   CALCIUM mg/dL 8.8   BILIRUBIN mg/dL 1.1   ALK PHOS U/L 110   ALT (SGPT) U/L 23   AST (SGOT) U/L 34   GLUCOSE mg/dL 95     Results from last 7 days   Lab Units 07/20/21  1625 07/20/21  1233   TROPONIN T ng/mL 0.010 0.013         Results from last 7 days   Lab Units 07/23/21  0521   WBC 10*3/mm3 3.96   HEMOGLOBIN g/dL 11.6*   HEMATOCRIT % 35.5*   PLATELETS 10*3/mm3 169     Results from last 7 days   Lab Units 07/20/21  1306   INR  1.39*   APTT seconds 31.5     Results from last 7 days   Lab Units 07/23/21  1044   MAGNESIUM mg/dL 2.4                   ECHO:  Results for orders placed during the hospital encounter of 07/23/20    Adult Transthoracic Echo Limited W/ Cont if Necessary Per Protocol    Interpretation Summary  · Left atrial cavity size is moderate-to-severely dilated.  · Mild mitral valve regurgitation is present  · Mild to moderate tricuspid valve regurgitation is present.  · The left ventricular cavity is mildly dilated.  · Mildly reduced right ventricular systolic function noted.  · The following left ventricular wall segments are hypokinetic: mid anterior, apical anterior, basal anterolateral, mid anterolateral, apical lateral, basal inferolateral, mid inferolateral, apical inferior, mid inferior, apical septal, basal inferoseptal, mid inferoseptal, apex hypokinetic, mid anteroseptal, basal anterior,  basal inferior and basal inferoseptal.      ECG 12 Lead   Final Result   Test Reason : SOA   Blood Pressure :   */*   mmHG   Vent. Rate :  83 BPM     Atrial Rate :  83 BPM      P-R Int : 180 ms          QRS Dur : 116 ms       QT Int : 352 ms       P-R-T Axes :  40 -38 114 degrees      QTc Int : 413 ms      Normal sinus rhythm   Nonspecific intraventricular conduction delay   Possible Left atrial enlargement   Left axis deviation   Left ventricular hypertrophy with QRS widening and repolarization    abnormality   Cannot rule out Septal infarct , age undetermined   Abnormal ECG   When compared with ECG of 30-DEC-2020 20:15,   Minimal criteria for Septal infarct are now Present      Referred By: AIDANR           Confirmed By: REENA RYAN      SCANNED EKG   Final Result           Medication Review:   Current Facility-Administered Medications   Medication Dose Route Frequency Provider Last Rate Last Admin   • acetaminophen (TYLENOL) tablet 650 mg  650 mg Oral Q4H PRN Alejandro Brunson MD        Or   • acetaminophen (TYLENOL) suppository 650 mg  650 mg Rectal Q4H PRN Alejandro Brunson MD       • albuterol (PROVENTIL) nebulizer solution 0.083% 2.5 mg/3mL  2.5 mg Nebulization Q4H PRN Alejandro Brunson MD       • apixaban (ELIQUIS) tablet 5 mg  5 mg Oral Q12H Alejandro Brunson MD   5 mg at 07/23/21 0824   • bisacodyl (DULCOLAX) EC tablet 10 mg  10 mg Oral Daily PRN Aracelis Booth MD       • calcium carbonate (TUMS) chewable tablet 500 mg (200 mg elemental)  2 tablet Oral BID PRN Alejandro Brunson MD       • carvedilol (COREG) tablet 3.125 mg  3.125 mg Oral BID With Meals Owen Perez MD   3.125 mg at 07/23/21 1731   • digoxin (LANOXIN) tablet 250 mcg  250 mcg Oral Daily Alejandro Brunson MD   250 mcg at 07/23/21 1232   • furosemide (LASIX) tablet 80 mg  80 mg Oral BID Alejandro Brunson MD   80 mg at 07/23/21 1731   • magnesium oxide (MAG-OX) tablet 400 mg  400 mg Oral BID Alejandro Brunson MD   400 mg at 07/23/21 0824   • Magnesium  Sulfate 2 gram Bolus, followed by 8 gram infusion (total Mg dose 10 grams)- Mg less than or equal to 1mg/dL  2 g Intravenous PRN Alejandro Brunson MD        Or   • Magnesium Sulfate 2 gram / 50mL Infusion (GIVE X 3 BAGS TO EQUAL 6GM TOTAL DOSE) - Mg 1.1 - 1.5 mg/dl  2 g Intravenous PRN Alejandro Brunson MD        Or   • Magnesium Sulfate 4 gram infusion- Mg 1.6-1.9 mg/dL  4 g Intravenous PRN Alejandro Brunson MD       • ondansetron (ZOFRAN) tablet 4 mg  4 mg Oral Q6H PRN Alejandro Brunson MD        Or   • ondansetron (ZOFRAN) injection 4 mg  4 mg Intravenous Q6H PRN Alejandro Brunson MD       • pantoprazole (PROTONIX) EC tablet 40 mg  40 mg Oral Q AM Alejandro Brunson MD   40 mg at 07/23/21 0621   • PATIENT SUPPLIED MEDICATION  70 mg Oral Daily Yissel Jay APRN       • potassium chloride (KLOR-CON) packet 40 mEq  40 mEq Oral PRN Alejandro Brunson MD       • potassium chloride (MICRO-K) CR capsule 40 mEq  40 mEq Oral PRN Alejandro Brunson MD       • sacubitril-valsartan (ENTRESTO) 24-26 MG tablet 0.5 tablet  0.5 tablet Oral Q12H Owen Perez MD   0.5 tablet at 07/23/21 0830   • sodium chloride 0.9 % flush 10 mL  10 mL Intravenous PRN Brenna Parrish APRN       • sodium chloride 0.9 % flush 10 mL  10 mL Intravenous Q12H Alejandro Brunson MD   10 mL at 07/23/21 0829   • sodium chloride 0.9 % flush 10 mL  10 mL Intravenous PRN Alejandro Brunson MD       • spironolactone (ALDACTONE) half tablet 12.5 mg  12.5 mg Oral Daily Alejandro Brunson MD   12.5 mg at 07/23/21 0829   • zolpidem (AMBIEN) tablet 10 mg  10 mg Oral Nightly PRN Alejandro Brunson MD   10 mg at 07/23/21 0147       Assessment and Plan:      Systolic congestive heart failure (CMS/HCC)  1. Fatigue lightheaded dizziness. Patient has had episodes of low blood pressure. Patient is currently off the IV dobutamine. Patient is getting half tablet of Entresto twice a day along with the Coreg 3.125 mg twice a day. Patient has been recommended to use support stockings. Would  consider decreasing the dose of the Lasix and Aldactone or even hold diuretics if the patient has no shortness of breath and has low blood pressure.    2. Nonischemic cardiomyopathy with severe left ventricular systolic dysfunction s/p [place] AICD placement. Patient at the present time is not compensated systolic heart failure. Patient is off the IV dobutamine and is currently on carvedilol and Entresto. Patient diuretic dose would be decreased.    3. On admission with symptoms of chest pain. Patient had not had any further recurrent symptoms or chest pain. Patient had undergone previous coronary angiogram which had not reveal of any evidence of any obstructive epicardial coronary artery disease. Patient has negative troponin. Patient at the present time would not be subjected to any invasive evaluation from the cardiac standpoint.    4. LV thrombus with atrial septal defect. Patient is on anticoagulation with Eliquis. Patient has not had any hemoptysis hematuria bright of blood per rectum.    5. Obstructive sleep apnea. Patient has been recommended to be compliant with the CPAP.    The above plan of management were discussed with the patient.            Electronically signed by Owen Perez MD, 07/23/21, 8:17 PM CDT.      Time: Time spent on face-to-face interaction 20 minutes      Dictated utilizing Dragon dictation.

## 2021-07-25 ENCOUNTER — READMISSION MANAGEMENT (OUTPATIENT)
Dept: CALL CENTER | Facility: HOSPITAL | Age: 41
End: 2021-07-25

## 2021-07-25 NOTE — OUTREACH NOTE
Prep Survey      Responses   [facility] facility patient discharged from?  [facility]   Is LACE score < 7 ?  No   Emergency Room discharge w/ pulse ox?  No   Eligibility  Readm Mgmt   Discharge diagnosis  Systolic congestive heart failure    Does the patient have one of the following disease processes/diagnoses(primary or secondary)?  CHF   Does the patient have Home health ordered?  No   Is there a DME ordered?  No   Prep survey completed?  Yes          Veronica Robertson RN

## 2021-07-26 NOTE — PAYOR COMM NOTE
"Pat [place]   [facility]  phone [phone]  fax [phone]    Auth# [id number]    Sanya Dahl (41 y.o. Male)     Date of Birth Social Security Number Address Home Phone MRN    1980  [address]  [place] [address] [phone] [id number]    [employer] Marital Status          Faith Single       Admission Date Admission Type Admitting Provider Attending Provider Department, Room/Bed    7/20/21 Emergency Alejandro Brunson MD  [facility] [address] [place], 327/1    Discharge Date Discharge Disposition Discharge Destination        7/24/2021 Home or Self Care              Attending Provider: (none)   Allergies: Chantix [Varenicline]    Isolation: None   Infection: None   Code Status: Prior    Ht: 180.3 cm (71\")   Wt: 84.1 kg (185 lb 6.4 oz)    Admission Cmt: None   Principal Problem: None                Active Insurance as of 7/20/2021     Primary Coverage     Payor Plan Insurance Group Employer/Plan Group    [employer] [place] [employer] [id number]     Payor Plan Address Payor Plan Phone Number Payor Plan Fax Number Effective Dates    [id number] [phone]  10/28/2017 - None Entered    [facility] [address]       Subscriber Name Subscriber Birth Date Member ID       SANYA DAHL 1980 [id number]                 Emergency Contacts      (Rel.) Home Phone Work Phone Mobile Phone    doug holland (Father) [phone] -- [phone]    Winifred Canales (Significant Other) [phone] -- --               Discharge Summary      Aracelis Booth MD at 07/24/21 1311              [facility]  DISCHARGE SUMMARY       Date of Admission: 7/20/2021  Date of Discharge:  7/24/2021  Primary Care Physician: Joslyn Rousseau APRN    Presenting Problem/History of Present Illness:  Chest pain, unspecified type [R07.9]  Systolic congestive heart failure, unspecified HF chronicity (CMS/HCC) " [I50.20]       Final Discharge Diagnoses:  Active Hospital Problems    Diagnosis    • Systolic congestive heart failure (CMS/[facility])        Consults:   Consults     Date and Time Order Name Status Description    7/21/2021  4:49 PM Inpatient Cardiology Consult      7/20/2021  5:31 PM Inpatient Cardiology Consult Completed           Procedures Performed:                 Pertinent Test Results:   Lab Results (most recent)     Procedure Component Value Units Date/Time    Extra Tubes [[id number]] Collected: 07/24/21 0628    Specimen: Blood, Venous Line Updated: 07/24/21 0731    Narrative:      The following orders were created for panel order Extra Tubes.  Procedure                               Abnormality         Status                     ---------                               -----------         ------                     Lavender Top[[id number]]                                     Final result                 Please view results for these tests on the individual orders.    Lavender Top [[id number]] Collected: 07/24/21 0628    Specimen: Blood Updated: 07/24/21 0731     Extra Tube hold for add-on     Comment: Auto resulted       Comprehensive Metabolic Panel [[id number]]  (Abnormal) Collected: 07/24/21 0627    Specimen: Blood Updated: 07/24/21 0723     Glucose 89 mg/dL      BUN 26 mg/dL      Creatinine 0.94 mg/dL      Sodium 132 mmol/L      Potassium 4.0 mmol/L      Chloride 99 mmol/L      CO2 23.0 mmol/L      Calcium 8.5 mg/dL      Total Protein 6.3 g/dL      Albumin 3.70 g/dL      ALT (SGPT) 30 U/L      AST (SGOT) 41 U/L      Alkaline Phosphatase 103 U/L      Total Bilirubin 1.1 mg/dL      eGFR Non African Amer 89 mL/min/1.73      Globulin 2.6 gm/dL      A/G Ratio 1.4 g/dL      BUN/Creatinine Ratio 27.7     Anion Gap 10.0 mmol/L     Narrative:      GFR Normal >60  Chronic Kidney Disease <60  Kidney Failure <15      Magnesium [[id number]]  (Normal) Collected: 07/24/21 0627    Specimen: Blood Updated: 07/24/21 0723      Magnesium 2.4 mg/dL     Magnesium [[id number]]  (Normal) Collected: 07/23/21 1044    Specimen: Blood Updated: 07/23/21 1105     Magnesium 2.4 mg/dL     Comprehensive Metabolic Panel [[id number]]  (Abnormal) Collected: 07/23/21 0521    Specimen: Blood Updated: 07/23/21 0615     Glucose 95 mg/dL      BUN 23 mg/dL      Creatinine 0.93 mg/dL      Sodium 131 mmol/L      Potassium 4.6 mmol/L      Chloride 97 mmol/L      CO2 25.0 mmol/L      Calcium 8.8 mg/dL      Total Protein 6.6 g/dL      Albumin 3.90 g/dL      ALT (SGPT) 23 U/L      AST (SGOT) 34 U/L      Alkaline Phosphatase 110 U/L      Total Bilirubin 1.1 mg/dL      eGFR Non African Amer 90 mL/min/1.73      Globulin 2.7 gm/dL      A/G Ratio 1.4 g/dL      BUN/Creatinine Ratio 24.7     Anion Gap 9.0 mmol/L     Narrative:      GFR Normal >60  Chronic Kidney Disease <60  Kidney Failure <15      CBC & Differential [[id number]]  (Abnormal) Collected: 07/23/21 0521    Specimen: Blood Updated: 07/23/21 0556    Narrative:      The following orders were created for panel order CBC & Differential.  Procedure                               Abnormality         Status                     ---------                               -----------         ------                     CBC Auto Differential[[id number]]        Abnormal            Final result                 Please view results for these tests on the individual orders.    CBC Auto Differential [[id number]]  (Abnormal) Collected: 07/23/21 0521    Specimen: Blood Updated: 07/23/21 0556     WBC 3.96 10*3/mm3      RBC 3.99 10*6/mm3      Hemoglobin 11.6 g/dL      Hematocrit 35.5 %      MCV 89.0 fL      MCH 29.1 pg      MCHC 32.7 g/dL      RDW 19.0 %      RDW-SD 59.5 fl      MPV 10.4 fL      Platelets 169 10*3/mm3      Neutrophil % 63.6 %      Lymphocyte % 22.7 %      Monocyte % 11.4 %      Eosinophil % 1.5 %      Basophil % 0.8 %      Immature Grans % 0.0 %      Neutrophils, Absolute 2.52 10*3/mm3      Lymphocytes, Absolute 0.90 10*3/mm3       Monocytes, Absolute 0.45 10*3/mm3      Eosinophils, Absolute 0.06 10*3/mm3      Basophils, Absolute 0.03 10*3/mm3      Immature Grans, Absolute 0.00 10*3/mm3      nRBC 0.0 /100 WBC     CBC & Differential [[id number]]  (Abnormal) Collected: 07/21/21 0705    Specimen: Blood Updated: 07/21/21 0711    Narrative:      The following orders were created for panel order CBC & Differential.  Procedure                               Abnormality         Status                     ---------                               -----------         ------                     CBC Auto Differential[[id number]]        Abnormal            Final result                 Please view results for these tests on the individual orders.    CBC Auto Differential [[id number]]  (Abnormal) Collected: 07/21/21 0705    Specimen: Blood Updated: 07/21/21 0711     WBC 3.94 10*3/mm3      RBC 4.30 10*6/mm3      Hemoglobin 11.8 g/dL      Hematocrit 37.2 %      MCV 86.5 fL      MCH 27.4 pg      MCHC 31.7 g/dL      RDW 18.4 %      RDW-SD 58.1 fl      MPV 10.2 fL      Platelets 169 10*3/mm3      Neutrophil % 55.1 %      Lymphocyte % 30.2 %      Monocyte % 12.4 %      Eosinophil % 1.3 %      Basophil % 1.0 %      Immature Grans % 0.0 %      Neutrophils, Absolute 2.17 10*3/mm3      Lymphocytes, Absolute 1.19 10*3/mm3      Monocytes, Absolute 0.49 10*3/mm3      Eosinophils, Absolute 0.05 10*3/mm3      Basophils, Absolute 0.04 10*3/mm3      Immature Grans, Absolute 0.00 10*3/mm3      nRBC 0.0 /100 WBC     Troponin [[id number]]  (Normal) Collected: 07/20/21 1625    Specimen: Blood Updated: 07/20/21 1647     Troponin T 0.010 ng/mL     Narrative:      Troponin T Reference Range:  <= 0.03 ng/mL-   Negative for AMI  >0.03 ng/mL-     Abnormal for myocardial necrosis.  Clinicians would have to utilize clinical acumen, EKG, Troponin and serial changes to determine if it is an Acute Myocardial Infarction or myocardial injury due to an underlying chronic condition.        Results may be falsely decreased if patient taking Biotin.      COVID-19 and FLU A/B PCR - Swab, Nasopharynx [[id number]]  (Normal) Collected: 07/20/21 1509    Specimen: Swab from Nasopharynx Updated: 07/20/21 1544     COVID19 Not Detected     Influenza A PCR Not Detected     Influenza B PCR Not Detected    Narrative:      Fact sheet for providers: https://www.fda.gov/media/006579/download    Fact sheet for patients: https://www.fda.gov/media/452716/download    Test performed by PCR.    Urinalysis With Microscopic If Indicated (No Culture) - Urine, Clean Catch [[id number]]  (Abnormal) Collected: 07/20/21 1441    Specimen: Urine, Clean Catch Updated: 07/20/21 1459     Color, UA Dark Yellow     Appearance, UA Clear     pH, UA 6.0     Specific Gravity, UA 1.020     Glucose, UA Negative     Ketones, UA Negative     Bilirubin, UA Small (1+)     Blood, UA Negative     Protein, UA Negative     Leuk Esterase, UA Negative     Nitrite, UA Negative     Urobilinogen, UA 1.0 E.U./dL    Narrative:      Urine microscopic not indicated.    Urine Drug Screen - Urine, Clean Catch [[id number]]  (Abnormal) Collected: 07/20/21 1441    Specimen: Urine, Clean Catch Updated: 07/20/21 1458     THC, Screen, Urine Negative     Phencyclidine (PCP), Urine Negative     Cocaine Screen, Urine Negative     Methamphetamine, Ur Negative     Opiate Screen Negative     Amphetamine Screen, Urine Positive     Benzodiazepine Screen, Urine Negative     Tricyclic Antidepressants Screen Negative     Methadone Screen, Urine Negative     Barbiturates Screen, Urine Negative     Oxycodone Screen, Urine Negative     Propoxyphene Screen Negative     Buprenorphine, Screen, Urine Negative    Narrative:      Cutoff For Drugs Screened:    Amphetamines               500 ng/ml  Barbiturates               200 ng/ml  Benzodiazepines            150 ng/ml  Cocaine                    150 ng/ml  Methadone                  200 ng/ml  Opiates                    100  ng/ml  Phencyclidine               25 ng/ml  THC                            50 ng/ml  Methamphetamine            500 ng/ml  Tricyclic Antidepressants  300 ng/ml  Oxycodone                  100 ng/ml  Propoxyphene               300 ng/ml  Buprenorphine               10 ng/ml    The normal value for all drugs tested is negative. This report includes unconfirmed screening results, with the cutoff values listed, to be used for medical treatment purposes only.  Unconfirmed results must not be used for non-medical purposes such as employment or legal testing.  Clinical consideration should be applied to any drug of abuse test, particularly when unconfirmed results are used.      [place] Draw [[id number]] Collected: 07/20/21 1233    Specimen: Blood Updated: 07/20/21 1345    Narrative:      The following orders were created for panel order [place] Draw.  Procedure                               Abnormality         Status                     ---------                               -----------         ------                     Green Top (Gel)[[id number]]                                  Final result               Lavender Top[[id number]]                                     Final result               Gold Top - SST[[id number]]                                   Final result                 Please view results for these tests on the individual orders.    Green Top (Gel) [[id number]] Collected: 07/20/21 1233    Specimen: Blood Updated: 07/20/21 1345     Extra Tube Hold for add-ons.     Comment: Auto resulted.       Lavender Top [[id number]] Collected: 07/20/21 1233    Specimen: Blood Updated: 07/20/21 1345     Extra Tube hold for add-on     Comment: Auto resulted       Gold Top - SST [[id number]] Collected: 07/20/21 1233    Specimen: Blood Updated: 07/20/21 1345     Extra Tube Hold for add-ons.     Comment: Auto resulted.       Troponin [[id number]]  (Normal) Collected: 07/20/21 1233    Specimen: Blood Updated: 07/20/21 1342     Troponin  T 0.013 ng/mL     Narrative:      Troponin T Reference Range:  <= 0.03 ng/mL-   Negative for AMI  >0.03 ng/mL-     Abnormal for myocardial necrosis.  Clinicians would have to utilize clinical acumen, EKG, Troponin and serial changes to determine if it is an Acute Myocardial Infarction or myocardial injury due to an underlying chronic condition.       Results may be falsely decreased if patient taking Biotin.      aPTT [[id number]]  (Normal) Collected: 07/20/21 1306    Specimen: Blood Updated: 07/20/21 1333     PTT 31.5 seconds     Narrative:      The recommended Heparin therapeutic range is 68-97 seconds.    Protime-INR [[id number]]  (Abnormal) Collected: 07/20/21 1306    Specimen: Blood Updated: 07/20/21 1333     Protime 16.8 Seconds      INR 1.39    Narrative:      Therapeutic range for most indications is 2.0-3.0 INR,  or 2.5-3.5 for mechanical heart valves.    Digoxin Level [[id number]]  (Normal) Collected: 07/20/21 1233    Specimen: Blood Updated: 07/20/21 1331     Digoxin 0.90 ng/mL     BNP [[id number]]  (Abnormal) Collected: 07/20/21 1233    Specimen: Blood Updated: 07/20/21 1330     proBNP 8,369.0 pg/mL     Narrative:      Among patients with dyspnea, NT-proBNP is highly sensitive for the detection of acute congestive heart failure. In addition NT-proBNP of <300 pg/ml effectively rules out acute congestive heart failure with 99% negative predictive value.    Results may be falsely decreased if patient taking Biotin.          Imaging Results (Most Recent)     Procedure Component Value Units Date/Time    XR Chest 2 View [[id number]] Collected: 07/20/21 1308     Updated: 07/20/21 1336    Narrative:      Chest x-ray PA and lateral           CLINICAL INDICATION: Shortness of breath.     COMPARISON: Chest x-ray December 30, 2020. None    FINDINGS: Cardiac silhouette is enlarged in size. Pulmonary  vascularity is unremarkable.   Pacemaker, AICD lead with tip in right ventricle.  No focal infiltrate or consolidation.  No  pleural effusion.  No  pneumothorax.  Minimal discoid fibrotic changes right lung base.    Impression:      Cardiomegaly and pacemaker, AICD lead with tip in  right ventricle. Minimal discoid fibrotic changes right lung  base. Otherwise unremarkable chest.    Electronically signed by:  Yamil Bennett MD  7/20/2021 1:35 PM CDT  Workstation: [id number]          Chief Complaint on Day of Discharge: Fatigue    Hospital Course:  The patient is a 41 y.o. male with past medical history most notable for chronic systolic congestive heart failure with most recent ejection fraction of 15 to 20% and history of pulmonary embolism who presented to [facility] ED on July 20, 2022 with complaints of chest pain and shortness of breath.  Upon initial evaluation the ER he was found to be notably hypotensive.  Cardiology consult was placed and patient was seen by Dr. Perez.  Commendations were made for dobutamine infusion.  Patient was referred to the hospitalist service for admission.  For complete admission history details please see H&P dated July 20 as per Dr. Brunson.  Acute coronary syndrome was excluded.  Recommendations were to continue metoprolol and Entresto and Lasix.  Patient remained largely euvolemic but hypotensive with difficulty weaning dobutamine infusion.  Entresto and beta-blocker dosing were decreased with subsequent discontinuation of Entresto.  Patient now has been off of dobutamine for greater than 24 hours with carvedilol at 3.125 twice daily and diuretic dosing and remained hemodynamically stable with systolic pressures averaging .  In discussion with Dr. Perez the patient is felt to be stable and at his baseline.  Home medication changes have been reviewed with the patient prior to discharge.  He strongly encouraged to keep his follow-up with both Dr. Perez and [facility] as previously scheduled.  Occasions for more urgent recheck reviewed with patient prior to discharge.  He  "voices understanding and is in agreement with plan.    Condition on Discharge:  Stable    Physical Exam on Discharge:  BP 90/53 (BP Location: Right arm, Patient Position: Lying)   Pulse 68   Temp 96 °F (35.6 °C) (Oral)   Resp 18   Ht 180.3 cm (71\")   Wt 84.1 kg (185 lb 6.4 oz)   SpO2 92%   BMI 25.86 kg/m²   Physical Exam  Vitals and nursing note reviewed.   Constitutional:       General: He is not in acute distress.     Appearance: Normal appearance.   Cardiovascular:      Rate and Rhythm: Normal rate and regular rhythm.      Pulses: Normal pulses.      Heart sounds: Normal heart sounds.   Pulmonary:      Effort: Pulmonary effort is normal. No respiratory distress.      Comments: Poor inspiratory effort, limited bibasilar air exchange otherwise clear.   Musculoskeletal:      Right lower leg: No edema.      Left lower leg: No edema.   Skin:     General: Skin is warm and dry.   Neurological:      General: No focal deficit present.      Mental Status: He is alert and oriented to person, place, and time.   Psychiatric:         Mood and Affect: Mood normal.         Behavior: Behavior normal.     Discharge Disposition:  Home or Self Care    Discharge Medications:     Discharge Medications      New Medications      Instructions Start Date   carvedilol 3.125 MG tablet  Commonly known as: COREG   3.125 mg, Oral, 2 Times Daily With Meals         Changes to Medications      Instructions Start Date   Magnesium Oxide 400 (240 Mg) MG tablet  What changed: Another medication with the same name was removed. Continue taking this medication, and follow the directions you see here.   400 mg, Oral, 2 Times Daily         Continue These Medications      Instructions Start Date   albuterol sulfate  (90 Base) MCG/ACT inhaler  Commonly known as: PROVENTIL HFA;VENTOLIN HFA;PROAIR HFA   2 puffs, Inhalation, Every 4 Hours PRN      Ambien 10 MG tablet  Generic drug: zolpidem   10 mg, Oral, Nightly PRN      apixaban 5 MG tablet " tablet  Commonly known as: ELIQUIS   5 mg, Oral      bumetanide 1 MG tablet  Commonly known as: BUMEX   No dose, route, or frequency recorded.      Calcium Carbonate Antacid 1250 MG/5ML   Oral      digoxin 250 MCG tablet  Commonly known as: LANOXIN   250 mcg, Oral, Daily Digoxin      furosemide 80 MG tablet  Commonly known as: LASIX   1 tablet, Oral, 2 Times Daily      lidocaine-prilocaine 2.5-2.5 % cream  Commonly known as: EMLA   As Needed      pantoprazole 40 MG EC tablet  Commonly known as: Protonix   40 mg, Oral, Daily      spironolactone 25 MG tablet  Commonly known as: ALDACTONE   12.5 mg, Oral, Daily      vitamin D 1.25 MG (99071 UT) capsule capsule  Commonly known as: ERGOCALCIFEROL   50,000 Units, Oral, Every 7 Days      Vyvanse 70 MG capsule  Generic drug: lisdexamfetamine   70 mg, Oral, Every Morning, Pt takes 1/2 in the morning and 1/2 in the afternoon         Stop These Medications    metoprolol succinate XL 25 MG 24 hr tablet  Commonly known as: TOPROL-XL     metoprolol tartrate 25 MG tablet  Commonly known as: LOPRESSOR     sacubitril-valsartan 24-26 MG tablet  Commonly known as: ENTRESTO            Discharge Diet: Cardiac    Activity at Discharge: As tolerated        Follow-up Appointments:   Future Appointments   Date Time Provider Department Center   8/2/2021  9:25 AM C19 PRE SCREEN [facility] C19[facility]   8/11/2021  1:00 PM Alejandro Hughes PA-C [facility]       Test Results Pending at Discharge:     Aracelis Booth MD    Time: greater than 30 minutes spent preparing discharge            The patient has current or prior documentation of LVEF less than 40%, or moderate to severely depressed left ventricular systolic function. The patient was prescribed or already taking a beta-blocker. ARB and ACEI are not prescribed due to hypotension.     Electronically signed by Aracelis Booth MD at 07/24/21 4497

## 2021-07-29 ENCOUNTER — READMISSION MANAGEMENT (OUTPATIENT)
Dept: CALL CENTER | Facility: HOSPITAL | Age: 41
End: 2021-07-29

## 2021-07-29 NOTE — OUTREACH NOTE
CHF Week 1 Survey      Responses   [facility] patient discharged from?  [facility]   Does the patient have one of the following disease processes/diagnoses(primary or secondary)?  CHF   CHF Week 1 attempt successful?  Yes   Revoke  Readmitted [pt has been in local hospital]          Marlen José RN

## 2021-07-31 ENCOUNTER — APPOINTMENT (OUTPATIENT)
Dept: CT IMAGING | Facility: HOSPITAL | Age: 41
End: 2021-07-31

## 2021-07-31 ENCOUNTER — APPOINTMENT (OUTPATIENT)
Dept: GENERAL RADIOLOGY | Facility: HOSPITAL | Age: 41
End: 2021-07-31

## 2021-07-31 ENCOUNTER — HOSPITAL ENCOUNTER (OUTPATIENT)
Facility: HOSPITAL | Age: 41
Setting detail: OBSERVATION
Discharge: HOME | End: 2021-08-02
Attending: EMERGENCY MEDICINE | Admitting: FAMILY MEDICINE

## 2021-07-31 DIAGNOSIS — I50.9 ACUTE ON CHRONIC CONGESTIVE HEART FAILURE, UNSPECIFIED HEART FAILURE TYPE (HCC): Primary | ICD-10-CM

## 2021-07-31 LAB
ALBUMIN SERPL-MCNC: 3.7 G/DL (ref 3.5–5.2)
ALBUMIN/GLOB SERPL: 1.5 G/DL
ALP SERPL-CCNC: 97 U/L (ref 39–117)
ALT SERPL W P-5'-P-CCNC: 101 U/L (ref 1–41)
ANION GAP SERPL CALCULATED.3IONS-SCNC: 13 MMOL/L (ref 5–15)
ANION GAP SERPL CALCULATED.3IONS-SCNC: 9 MMOL/L (ref 5–15)
AST SERPL-CCNC: 81 U/L (ref 1–40)
BASOPHILS # BLD AUTO: 0.03 10*3/MM3 (ref 0–0.2)
BASOPHILS # BLD AUTO: 0.04 10*3/MM3 (ref 0–0.2)
BASOPHILS NFR BLD AUTO: 0.6 % (ref 0–1.5)
BASOPHILS NFR BLD AUTO: 0.8 % (ref 0–1.5)
BILIRUB SERPL-MCNC: 1.7 MG/DL (ref 0–1.2)
BUN SERPL-MCNC: 24 MG/DL (ref 6–20)
BUN SERPL-MCNC: 24 MG/DL (ref 6–20)
BUN/CREAT SERPL: 23.1 (ref 7–25)
BUN/CREAT SERPL: 24.2 (ref 7–25)
CALCIUM SPEC-SCNC: 8.4 MG/DL (ref 8.6–10.5)
CALCIUM SPEC-SCNC: 8.6 MG/DL (ref 8.6–10.5)
CHLORIDE SERPL-SCNC: 87 MMOL/L (ref 98–107)
CHLORIDE SERPL-SCNC: 94 MMOL/L (ref 98–107)
CO2 SERPL-SCNC: 23 MMOL/L (ref 22–29)
CO2 SERPL-SCNC: 25 MMOL/L (ref 22–29)
CREAT SERPL-MCNC: 0.99 MG/DL (ref 0.76–1.27)
CREAT SERPL-MCNC: 1.04 MG/DL (ref 0.76–1.27)
DEPRECATED RDW RBC AUTO: 55.7 FL (ref 37–54)
DEPRECATED RDW RBC AUTO: 57 FL (ref 37–54)
DIGOXIN SERPL-MCNC: 0.4 NG/ML (ref 0.6–1.2)
EOSINOPHIL # BLD AUTO: 0.08 10*3/MM3 (ref 0–0.4)
EOSINOPHIL # BLD AUTO: 0.12 10*3/MM3 (ref 0–0.4)
EOSINOPHIL NFR BLD AUTO: 1.5 % (ref 0.3–6.2)
EOSINOPHIL NFR BLD AUTO: 2.4 % (ref 0.3–6.2)
ERYTHROCYTE [DISTWIDTH] IN BLOOD BY AUTOMATED COUNT: 17.9 % (ref 12.3–15.4)
ERYTHROCYTE [DISTWIDTH] IN BLOOD BY AUTOMATED COUNT: 18.2 % (ref 12.3–15.4)
FLUAV RNA RESP QL NAA+PROBE: NOT DETECTED
FLUBV RNA RESP QL NAA+PROBE: NOT DETECTED
GFR SERPL CREATININE-BSD FRML MDRD: 80 ML/MIN/1.73
GFR SERPL CREATININE-BSD FRML MDRD: 84 ML/MIN/1.73
GLOBULIN UR ELPH-MCNC: 2.5 GM/DL
GLUCOSE SERPL-MCNC: 145 MG/DL (ref 65–99)
GLUCOSE SERPL-MCNC: 90 MG/DL (ref 65–99)
HCT VFR BLD AUTO: 33.4 % (ref 37.5–51)
HCT VFR BLD AUTO: 35.5 % (ref 37.5–51)
HGB BLD-MCNC: 10.8 G/DL (ref 13–17.7)
HGB BLD-MCNC: 11.2 G/DL (ref 13–17.7)
HOLD SPECIMEN: NORMAL
HOLD SPECIMEN: NORMAL
IMM GRANULOCYTES # BLD AUTO: 0.01 10*3/MM3 (ref 0–0.05)
IMM GRANULOCYTES # BLD AUTO: 0.02 10*3/MM3 (ref 0–0.05)
IMM GRANULOCYTES NFR BLD AUTO: 0.2 % (ref 0–0.5)
IMM GRANULOCYTES NFR BLD AUTO: 0.4 % (ref 0–0.5)
LIPASE SERPL-CCNC: 41 U/L (ref 13–60)
LYMPHOCYTES # BLD AUTO: 0.91 10*3/MM3 (ref 0.7–3.1)
LYMPHOCYTES # BLD AUTO: 1.17 10*3/MM3 (ref 0.7–3.1)
LYMPHOCYTES NFR BLD AUTO: 17.1 % (ref 19.6–45.3)
LYMPHOCYTES NFR BLD AUTO: 23.5 % (ref 19.6–45.3)
MAGNESIUM SERPL-MCNC: 2.1 MG/DL (ref 1.6–2.6)
MCH RBC QN AUTO: 27.4 PG (ref 26.6–33)
MCH RBC QN AUTO: 27.7 PG (ref 26.6–33)
MCHC RBC AUTO-ENTMCNC: 31.5 G/DL (ref 31.5–35.7)
MCHC RBC AUTO-ENTMCNC: 32.3 G/DL (ref 31.5–35.7)
MCV RBC AUTO: 85.6 FL (ref 79–97)
MCV RBC AUTO: 86.8 FL (ref 79–97)
MONOCYTES # BLD AUTO: 0.59 10*3/MM3 (ref 0.1–0.9)
MONOCYTES # BLD AUTO: 0.7 10*3/MM3 (ref 0.1–0.9)
MONOCYTES NFR BLD AUTO: 11.9 % (ref 5–12)
MONOCYTES NFR BLD AUTO: 13.1 % (ref 5–12)
NEUTROPHILS NFR BLD AUTO: 3.05 10*3/MM3 (ref 1.7–7)
NEUTROPHILS NFR BLD AUTO: 3.58 10*3/MM3 (ref 1.7–7)
NEUTROPHILS NFR BLD AUTO: 61.4 % (ref 42.7–76)
NEUTROPHILS NFR BLD AUTO: 67.1 % (ref 42.7–76)
NRBC BLD AUTO-RTO: 0 /100 WBC (ref 0–0.2)
NRBC BLD AUTO-RTO: 0.4 /100 WBC (ref 0–0.2)
NT-PROBNP SERPL-MCNC: 7254 PG/ML (ref 0–450)
PLATELET # BLD AUTO: 221 10*3/MM3 (ref 140–450)
PLATELET # BLD AUTO: 226 10*3/MM3 (ref 140–450)
PMV BLD AUTO: 10.2 FL (ref 6–12)
PMV BLD AUTO: 10.9 FL (ref 6–12)
POTASSIUM SERPL-SCNC: 4.2 MMOL/L (ref 3.5–5.2)
POTASSIUM SERPL-SCNC: 4.2 MMOL/L (ref 3.5–5.2)
PROT SERPL-MCNC: 6.2 G/DL (ref 6–8.5)
QT INTERVAL: 352 MS
QTC INTERVAL: 411 MS
RBC # BLD AUTO: 3.9 10*6/MM3 (ref 4.14–5.8)
RBC # BLD AUTO: 4.09 10*6/MM3 (ref 4.14–5.8)
SARS-COV-2 RNA RESP QL NAA+PROBE: NOT DETECTED
SODIUM SERPL-SCNC: 123 MMOL/L (ref 136–145)
SODIUM SERPL-SCNC: 128 MMOL/L (ref 136–145)
TROPONIN T SERPL-MCNC: <0.01 NG/ML (ref 0–0.03)
TROPONIN T SERPL-MCNC: <0.01 NG/ML (ref 0–0.03)
WBC # BLD AUTO: 4.97 10*3/MM3 (ref 3.4–10.8)
WBC # BLD AUTO: 5.33 10*3/MM3 (ref 3.4–10.8)
WHOLE BLOOD HOLD SPECIMEN: NORMAL

## 2021-07-31 PROCEDURE — 85025 COMPLETE CBC W/AUTO DIFF WBC: CPT

## 2021-07-31 PROCEDURE — 83690 ASSAY OF LIPASE: CPT | Performed by: EMERGENCY MEDICINE

## 2021-07-31 PROCEDURE — 83880 ASSAY OF NATRIURETIC PEPTIDE: CPT | Performed by: EMERGENCY MEDICINE

## 2021-07-31 PROCEDURE — C9803 HOPD COVID-19 SPEC COLLECT: HCPCS

## 2021-07-31 PROCEDURE — 96376 TX/PRO/DX INJ SAME DRUG ADON: CPT

## 2021-07-31 PROCEDURE — 83735 ASSAY OF MAGNESIUM: CPT | Performed by: INTERNAL MEDICINE

## 2021-07-31 PROCEDURE — G0378 HOSPITAL OBSERVATION PER HR: HCPCS

## 2021-07-31 PROCEDURE — 84484 ASSAY OF TROPONIN QUANT: CPT | Performed by: EMERGENCY MEDICINE

## 2021-07-31 PROCEDURE — 71045 X-RAY EXAM CHEST 1 VIEW: CPT

## 2021-07-31 PROCEDURE — 25010000002 IOPAMIDOL 61 % SOLUTION: Performed by: EMERGENCY MEDICINE

## 2021-07-31 PROCEDURE — 93010 ELECTROCARDIOGRAM REPORT: CPT | Performed by: INTERNAL MEDICINE

## 2021-07-31 PROCEDURE — 80053 COMPREHEN METABOLIC PANEL: CPT | Performed by: EMERGENCY MEDICINE

## 2021-07-31 PROCEDURE — 96374 THER/PROPH/DIAG INJ IV PUSH: CPT

## 2021-07-31 PROCEDURE — 93005 ELECTROCARDIOGRAM TRACING: CPT | Performed by: EMERGENCY MEDICINE

## 2021-07-31 PROCEDURE — 87636 SARSCOV2 & INF A&B AMP PRB: CPT | Performed by: EMERGENCY MEDICINE

## 2021-07-31 PROCEDURE — 85025 COMPLETE CBC W/AUTO DIFF WBC: CPT | Performed by: INTERNAL MEDICINE

## 2021-07-31 PROCEDURE — 93005 ELECTROCARDIOGRAM TRACING: CPT

## 2021-07-31 PROCEDURE — 36415 COLL VENOUS BLD VENIPUNCTURE: CPT | Performed by: INTERNAL MEDICINE

## 2021-07-31 PROCEDURE — 36415 COLL VENOUS BLD VENIPUNCTURE: CPT

## 2021-07-31 PROCEDURE — 80162 ASSAY OF DIGOXIN TOTAL: CPT | Performed by: EMERGENCY MEDICINE

## 2021-07-31 PROCEDURE — 99285 EMERGENCY DEPT VISIT HI MDM: CPT

## 2021-07-31 PROCEDURE — 74177 CT ABD & PELVIS W/CONTRAST: CPT

## 2021-07-31 RX ORDER — SODIUM CHLORIDE 0.9 % (FLUSH) 0.9 %
10 SYRINGE (ML) INJECTION AS NEEDED
Status: DISCONTINUED | OUTPATIENT
Start: 2021-07-31 | End: 2021-08-02 | Stop reason: HOSPADM

## 2021-07-31 RX ORDER — SODIUM CHLORIDE 0.9 % (FLUSH) 0.9 %
10 SYRINGE (ML) INJECTION EVERY 12 HOURS SCHEDULED
Status: DISCONTINUED | OUTPATIENT
Start: 2021-07-31 | End: 2021-08-02 | Stop reason: HOSPADM

## 2021-07-31 RX ORDER — SIMETHICONE 80 MG
80 TABLET,CHEWABLE ORAL 4 TIMES DAILY PRN
Status: DISCONTINUED | OUTPATIENT
Start: 2021-07-31 | End: 2021-08-02 | Stop reason: HOSPADM

## 2021-07-31 RX ORDER — DIGOXIN 250 MCG
250 TABLET ORAL
Status: DISCONTINUED | OUTPATIENT
Start: 2021-07-31 | End: 2021-08-02 | Stop reason: HOSPADM

## 2021-07-31 RX ORDER — DICYCLOMINE HYDROCHLORIDE 10 MG/1
10 CAPSULE ORAL
COMMUNITY

## 2021-07-31 RX ORDER — BUMETANIDE 0.25 MG/ML
0.5 INJECTION INTRAMUSCULAR; INTRAVENOUS ONCE
Status: COMPLETED | OUTPATIENT
Start: 2021-07-31 | End: 2021-07-31

## 2021-07-31 RX ORDER — CARVEDILOL 3.12 MG/1
3.12 TABLET ORAL 2 TIMES DAILY WITH MEALS
Status: DISCONTINUED | OUTPATIENT
Start: 2021-07-31 | End: 2021-08-02 | Stop reason: HOSPADM

## 2021-07-31 RX ORDER — PANTOPRAZOLE SODIUM 40 MG/10ML
40 INJECTION, POWDER, LYOPHILIZED, FOR SOLUTION INTRAVENOUS NIGHTLY
Status: DISCONTINUED | OUTPATIENT
Start: 2021-07-31 | End: 2021-07-31

## 2021-07-31 RX ORDER — MELATONIN
1000 DAILY
COMMUNITY

## 2021-07-31 RX ORDER — ONDANSETRON 2 MG/ML
4 INJECTION INTRAMUSCULAR; INTRAVENOUS EVERY 6 HOURS PRN
Status: DISCONTINUED | OUTPATIENT
Start: 2021-07-31 | End: 2021-08-02 | Stop reason: HOSPADM

## 2021-07-31 RX ORDER — PANTOPRAZOLE SODIUM 40 MG/1
40 TABLET, DELAYED RELEASE ORAL ONCE
Status: COMPLETED | OUTPATIENT
Start: 2021-07-31 | End: 2021-07-31

## 2021-07-31 RX ADMIN — APIXABAN 5 MG: 5 TABLET, FILM COATED ORAL at 20:45

## 2021-07-31 RX ADMIN — SODIUM CHLORIDE, PRESERVATIVE FREE 10 ML: 5 INJECTION INTRAVENOUS at 08:57

## 2021-07-31 RX ADMIN — CARVEDILOL 3.12 MG: 3.12 TABLET, FILM COATED ORAL at 10:07

## 2021-07-31 RX ADMIN — CARVEDILOL 3.12 MG: 3.12 TABLET, FILM COATED ORAL at 17:04

## 2021-07-31 RX ADMIN — SIMETHICONE 80 MG: 80 TABLET, CHEWABLE ORAL at 20:45

## 2021-07-31 RX ADMIN — DIGOXIN 250 MCG: 250 TABLET ORAL at 12:35

## 2021-07-31 RX ADMIN — BUMETANIDE 0.5 MG: 0.25 INJECTION INTRAMUSCULAR; INTRAVENOUS at 15:12

## 2021-07-31 RX ADMIN — PANTOPRAZOLE SODIUM 40 MG: 40 TABLET, DELAYED RELEASE ORAL at 09:21

## 2021-07-31 RX ADMIN — BUMETANIDE 0.5 MG: 0.25 INJECTION INTRAMUSCULAR; INTRAVENOUS at 08:51

## 2021-07-31 RX ADMIN — APIXABAN 5 MG: 5 TABLET, FILM COATED ORAL at 10:07

## 2021-07-31 RX ADMIN — IOPAMIDOL 90 ML: 612 INJECTION, SOLUTION INTRAVENOUS at 02:28

## 2021-08-01 ENCOUNTER — APPOINTMENT (OUTPATIENT)
Dept: GENERAL RADIOLOGY | Facility: HOSPITAL | Age: 41
End: 2021-08-01

## 2021-08-01 LAB
AMPHET+METHAMPHET UR QL: POSITIVE
AMPHETAMINES UR QL: NEGATIVE
ANION GAP SERPL CALCULATED.3IONS-SCNC: 10 MMOL/L (ref 5–15)
BARBITURATES UR QL SCN: NEGATIVE
BASOPHILS # BLD AUTO: 0.03 10*3/MM3 (ref 0–0.2)
BASOPHILS NFR BLD AUTO: 0.4 % (ref 0–1.5)
BENZODIAZ UR QL SCN: NEGATIVE
BUN SERPL-MCNC: 21 MG/DL (ref 6–20)
BUN/CREAT SERPL: 23.6 (ref 7–25)
BUPRENORPHINE SERPL-MCNC: NEGATIVE NG/ML
CALCIUM SPEC-SCNC: 8.7 MG/DL (ref 8.6–10.5)
CANNABINOIDS SERPL QL: NEGATIVE
CHLORIDE SERPL-SCNC: 91 MMOL/L (ref 98–107)
CO2 SERPL-SCNC: 26 MMOL/L (ref 22–29)
COCAINE UR QL: NEGATIVE
CREAT SERPL-MCNC: 0.89 MG/DL (ref 0.76–1.27)
DEPRECATED RDW RBC AUTO: 55.1 FL (ref 37–54)
EOSINOPHIL # BLD AUTO: 0.11 10*3/MM3 (ref 0–0.4)
EOSINOPHIL NFR BLD AUTO: 1.4 % (ref 0.3–6.2)
ERYTHROCYTE [DISTWIDTH] IN BLOOD BY AUTOMATED COUNT: 17.6 % (ref 12.3–15.4)
GFR SERPL CREATININE-BSD FRML MDRD: 95 ML/MIN/1.73
GLUCOSE SERPL-MCNC: 93 MG/DL (ref 65–99)
HCT VFR BLD AUTO: 33.1 % (ref 37.5–51)
HGB BLD-MCNC: 10.7 G/DL (ref 13–17.7)
IMM GRANULOCYTES # BLD AUTO: 0.02 10*3/MM3 (ref 0–0.05)
IMM GRANULOCYTES NFR BLD AUTO: 0.3 % (ref 0–0.5)
LYMPHOCYTES # BLD AUTO: 1.03 10*3/MM3 (ref 0.7–3.1)
LYMPHOCYTES NFR BLD AUTO: 13.3 % (ref 19.6–45.3)
MAGNESIUM SERPL-MCNC: 2.1 MG/DL (ref 1.6–2.6)
MCH RBC QN AUTO: 27.6 PG (ref 26.6–33)
MCHC RBC AUTO-ENTMCNC: 32.3 G/DL (ref 31.5–35.7)
MCV RBC AUTO: 85.3 FL (ref 79–97)
METHADONE UR QL SCN: NEGATIVE
MONOCYTES # BLD AUTO: 0.62 10*3/MM3 (ref 0.1–0.9)
MONOCYTES NFR BLD AUTO: 8 % (ref 5–12)
NEUTROPHILS NFR BLD AUTO: 5.91 10*3/MM3 (ref 1.7–7)
NEUTROPHILS NFR BLD AUTO: 76.6 % (ref 42.7–76)
NRBC BLD AUTO-RTO: 0 /100 WBC (ref 0–0.2)
OPIATES UR QL: NEGATIVE
OXYCODONE UR QL SCN: NEGATIVE
PCP UR QL SCN: NEGATIVE
PLATELET # BLD AUTO: 197 10*3/MM3 (ref 140–450)
PMV BLD AUTO: 10.6 FL (ref 6–12)
POTASSIUM SERPL-SCNC: 4.8 MMOL/L (ref 3.5–5.2)
PROPOXYPH UR QL: NEGATIVE
RBC # BLD AUTO: 3.88 10*6/MM3 (ref 4.14–5.8)
SODIUM SERPL-SCNC: 127 MMOL/L (ref 136–145)
TRICYCLICS UR QL SCN: NEGATIVE
WBC # BLD AUTO: 7.72 10*3/MM3 (ref 3.4–10.8)

## 2021-08-01 PROCEDURE — 83735 ASSAY OF MAGNESIUM: CPT | Performed by: FAMILY MEDICINE

## 2021-08-01 PROCEDURE — 85025 COMPLETE CBC W/AUTO DIFF WBC: CPT | Performed by: FAMILY MEDICINE

## 2021-08-01 PROCEDURE — 36415 COLL VENOUS BLD VENIPUNCTURE: CPT | Performed by: FAMILY MEDICINE

## 2021-08-01 PROCEDURE — 80306 DRUG TEST PRSMV INSTRMNT: CPT | Performed by: INTERNAL MEDICINE

## 2021-08-01 PROCEDURE — 80048 BASIC METABOLIC PNL TOTAL CA: CPT | Performed by: FAMILY MEDICINE

## 2021-08-01 PROCEDURE — 71045 X-RAY EXAM CHEST 1 VIEW: CPT

## 2021-08-01 PROCEDURE — G0378 HOSPITAL OBSERVATION PER HR: HCPCS

## 2021-08-01 RX ORDER — BUMETANIDE 0.25 MG/ML
0.5 INJECTION INTRAMUSCULAR; INTRAVENOUS EVERY 12 HOURS SCHEDULED
Status: DISCONTINUED | OUTPATIENT
Start: 2021-08-01 | End: 2021-08-02 | Stop reason: HOSPADM

## 2021-08-01 RX ORDER — ZOLPIDEM TARTRATE 5 MG/1
5 TABLET ORAL NIGHTLY PRN
Status: DISCONTINUED | OUTPATIENT
Start: 2021-08-01 | End: 2021-08-01

## 2021-08-01 RX ORDER — PANTOPRAZOLE SODIUM 40 MG/1
40 TABLET, DELAYED RELEASE ORAL DAILY
Status: DISCONTINUED | OUTPATIENT
Start: 2021-08-01 | End: 2021-08-02 | Stop reason: HOSPADM

## 2021-08-01 RX ADMIN — DIGOXIN 250 MCG: 250 TABLET ORAL at 13:30

## 2021-08-01 RX ADMIN — CARVEDILOL 3.12 MG: 3.12 TABLET, FILM COATED ORAL at 18:10

## 2021-08-01 RX ADMIN — BUMETANIDE 0.5 MG: 0.25 INJECTION INTRAMUSCULAR; INTRAVENOUS at 14:52

## 2021-08-01 RX ADMIN — ZOLPIDEM TARTRATE 5 MG: 5 TABLET ORAL at 20:28

## 2021-08-01 RX ADMIN — SIMETHICONE 80 MG: 80 TABLET, CHEWABLE ORAL at 20:27

## 2021-08-01 RX ADMIN — PANTOPRAZOLE SODIUM 40 MG: 40 TABLET, DELAYED RELEASE ORAL at 18:10

## 2021-08-01 RX ADMIN — SODIUM CHLORIDE, PRESERVATIVE FREE 10 ML: 5 INJECTION INTRAVENOUS at 09:15

## 2021-08-01 RX ADMIN — CARVEDILOL 3.12 MG: 3.12 TABLET, FILM COATED ORAL at 09:15

## 2021-08-01 RX ADMIN — APIXABAN 5 MG: 5 TABLET, FILM COATED ORAL at 09:15

## 2021-08-01 NOTE — PROGRESS NOTES
"    [facility] Medicine Services  INPATIENT PROGRESS NOTE    Length of Stay: 0  Date of Admission: 7/31/2021  Primary Care Physician: Joslyn Rousseau APRN    Subjective   Chief Complaint: Abdominal Pain  HPI:  Said abdominal pain is better today. Less severe. Still feels that he has fluid on his abdomen. No nausea, vomiting. Has eaten 100% of lunch tray provided to him.   No diarrhea.   Says he has acutely worse shortness of breath today. Some coughing but non productive.   No chest pain.   Says he has \"not been this sick in a really long time.\"    Review of Systems     All pertinent negatives and positives are as above. All other systems have been reviewed and are negative unless otherwise stated.     Objective    Temp:  [96.2 °F (35.7 °C)-97.3 °F (36.3 °C)] 96.9 °F (36.1 °C)  Heart Rate:  [70-94] 94  Resp:  [18-20] 18  BP: ()/(57-77) 94/66    Physical Exam  Vitals and nursing note reviewed.   Constitutional:       General: He is not in acute distress.  HENT:      Head: Normocephalic.   Cardiovascular:      Rate and Rhythm: Normal rate.   Pulmonary:      Effort: Pulmonary effort is normal.      Comments: Diminished bilateral bases but otherwise clear  Abdominal:      General: There is distension.      Palpations: Abdomen is soft.      Tenderness: There is abdominal tenderness. There is no guarding or rebound.   Musculoskeletal:      Right lower leg: Edema present.      Left lower leg: Edema present.   Skin:     General: Skin is warm and dry.   Neurological:      General: No focal deficit present.      Mental Status: He is alert and oriented to person, place, and time.   Psychiatric:         Behavior: Behavior normal.             Results Review:  I have reviewed the labs, radiology results, and diagnostic studies.    Laboratory Data:   Results from last 7 days   Lab Units 08/01/21  0621 07/31/21  0712 07/31/21  0021   SODIUM mmol/L 127* 123* 128*   POTASSIUM mmol/L " 4.8 4.2 4.2   CHLORIDE mmol/L 91* 87* 94*   CO2 mmol/L 26.0 23.0 25.0   BUN mg/dL 21* 24* 24*   CREATININE mg/dL 0.89 1.04 0.99   GLUCOSE mg/dL 93 90 145*   CALCIUM mg/dL 8.7 8.6 8.4*   BILIRUBIN mg/dL  --   --  1.7*   ALK PHOS U/L  --   --  97   ALT (SGPT) U/L  --   --  101*   AST (SGOT) U/L  --   --  81*   ANION GAP mmol/L 10.0 13.0 9.0     Estimated Creatinine Clearance: 128.8 mL/min (by C-G formula based on SCr of 0.89 mg/dL).  Results from last 7 days   Lab Units 08/01/21  0621 07/31/21  0712   MAGNESIUM mg/dL 2.1 2.1         Results from last 7 days   Lab Units 08/01/21  0621 07/31/21  0712 07/31/21  0021 07/27/21  0414 07/26/21  0550   WBC 10*3/mm3 7.72 4.97 5.33 6.8 5.4   HEMOGLOBIN g/dL 10.7* 11.2* 10.8* 11.2* 11.9*   HEMATOCRIT % 33.1* 35.5* 33.4* 35.1* 39.2   PLATELETS 10*3/mm3 197 226 221 192 202           Culture Data:   No results found for: BLOODCX  No results found for: URINECX  No results found for: RESPCX  No results found for: WOUNDCX  No results found for: STOOLCX  No components found for: BODYFLD    Radiology Data:   Imaging Results (Last 24 Hours)     ** No results found for the last 24 hours. **          I have reviewed the patient's current medications.     Assessment/Plan         Acute on chronic congestive heart failure (CMS/HCC)-Nonischemic cardiomyopathy with EF 15% followed by Dr. Perez. Reported 10 pound weight gain prior to admission, down 4 # today since admission. No evidence of ACS.  Remains hemodynamically stable with ongoing diuresis. Continue PO fluid restriction.    History of PE-anticoagulation with Eliquis.    Abdominal Pain-Question etiology. CT abdomen/pelvis without acute findings. Noted small amount ascites, question anasarca. Will continue IV diuresis, supportive care. Exam improved today.    Shortness of breath-Repeat CXR today. Exam not consistent with overload.    Fatty liver    Discharge Planning: I expect patient to be discharged in 1-2 days.     Aracelis Booth,  MD

## 2021-08-01 NOTE — PLAN OF CARE
Problem: Adjustment to Illness (Heart Failure)  Goal: Optimal Coping  Outcome: Ongoing, Progressing  Intervention: Support and Optimize Psychosocial Response to Chronic Illness  Recent Flowsheet Documentation  Taken 7/31/2021 1932 by Stephania Patel RN  Supportive Measures: active listening utilized   Goal Outcome Evaluation:  Plan of Care Reviewed With: patient        Progress: no change

## 2021-08-02 ENCOUNTER — LAB (OUTPATIENT)
Dept: LAB | Facility: HOSPITAL | Age: 41
End: 2021-08-02

## 2021-08-02 ENCOUNTER — TELEPHONE (OUTPATIENT)
Dept: GASTROENTEROLOGY | Facility: CLINIC | Age: 41
End: 2021-08-02

## 2021-08-02 VITALS
DIASTOLIC BLOOD PRESSURE: 79 MMHG | SYSTOLIC BLOOD PRESSURE: 119 MMHG | HEIGHT: 71 IN | WEIGHT: 202.6 LBS | BODY MASS INDEX: 28.36 KG/M2 | HEART RATE: 83 BPM | OXYGEN SATURATION: 100 % | RESPIRATION RATE: 18 BRPM | TEMPERATURE: 97.2 F

## 2021-08-02 DIAGNOSIS — Z01.818 PREOP TESTING: Primary | ICD-10-CM

## 2021-08-02 LAB
ANION GAP SERPL CALCULATED.3IONS-SCNC: 11 MMOL/L (ref 5–15)
BUN SERPL-MCNC: 22 MG/DL (ref 6–20)
BUN/CREAT SERPL: 22.9 (ref 7–25)
CALCIUM SPEC-SCNC: 9 MG/DL (ref 8.6–10.5)
CHLORIDE SERPL-SCNC: 93 MMOL/L (ref 98–107)
CO2 SERPL-SCNC: 26 MMOL/L (ref 22–29)
CREAT SERPL-MCNC: 0.96 MG/DL (ref 0.76–1.27)
GFR SERPL CREATININE-BSD FRML MDRD: 87 ML/MIN/1.73
GLUCOSE SERPL-MCNC: 93 MG/DL (ref 65–99)
HOLD SPECIMEN: NORMAL
POTASSIUM SERPL-SCNC: 4.5 MMOL/L (ref 3.5–5.2)
SODIUM SERPL-SCNC: 130 MMOL/L (ref 136–145)

## 2021-08-02 PROCEDURE — G0378 HOSPITAL OBSERVATION PER HR: HCPCS

## 2021-08-02 PROCEDURE — 80048 BASIC METABOLIC PNL TOTAL CA: CPT | Performed by: FAMILY MEDICINE

## 2021-08-02 RX ORDER — BUMETANIDE 1 MG/1
1 TABLET ORAL 2 TIMES DAILY
Qty: 60 TABLET | Refills: 3 | Status: SHIPPED | OUTPATIENT
Start: 2021-08-02 | End: 2022-01-31

## 2021-08-02 RX ORDER — BUMETANIDE 0.5 MG/1
1 TABLET ORAL 2 TIMES DAILY
Qty: 60 TABLET | Refills: 3 | Status: SHIPPED | OUTPATIENT
Start: 2021-08-02 | End: 2021-08-02 | Stop reason: SDUPTHER

## 2021-08-02 RX ADMIN — SODIUM CHLORIDE, PRESERVATIVE FREE 10 ML: 5 INJECTION INTRAVENOUS at 08:50

## 2021-08-02 RX ADMIN — CARVEDILOL 3.12 MG: 3.12 TABLET, FILM COATED ORAL at 08:49

## 2021-08-02 RX ADMIN — APIXABAN 5 MG: 5 TABLET, FILM COATED ORAL at 08:49

## 2021-08-02 RX ADMIN — BUMETANIDE 0.5 MG: 0.25 INJECTION INTRAMUSCULAR; INTRAVENOUS at 05:36

## 2021-08-02 RX ADMIN — DIGOXIN 250 MCG: 250 TABLET ORAL at 11:30

## 2021-08-02 RX ADMIN — PANTOPRAZOLE SODIUM 40 MG: 40 TABLET, DELAYED RELEASE ORAL at 08:49

## 2021-08-02 NOTE — CONSULTS
Adult Nutrition  Assessment    Patient Name:  Didier Dahl  YOB: 1980  MRN: [id number]  Admit Date:  7/31/2021    Assessment Date:  8/2/2021    Comments:  Pt admit d/t CHF. Pt is very familiar w/Low Na diet information and is able to tell me many diet changes he has made in the past year. RD reviewed some extra tips for seasoning foods and grocery shopping. Pt is aware of fluid restriction guidelines and says he monitors this at home. RD encouraged pt to call w/?s.     Reason for Assessment     Row Name 08/02/21 1242          Reason for Assessment    Reason For Assessment  per organizational policy     Diagnosis  cardiac disease     Identified At Risk by Screening Criteria  need for education                   Nutrition Prescription Ordered     Row Name 08/02/21 1242          Nutrition Prescription PO    Current PO Diet  Regular     Fluid Consistency  Thin     Common Modifiers  Cardiac;Consistent Carbohydrate                 Electronically signed by:  Lalitha Enriquez RD  08/02/21 12:44 CDT

## 2021-08-02 NOTE — NURSING NOTE
Patient refuses to take elequis as ordered. Education done on the need to take this medication as ordered. Patient is also exhibiting abnormal behavior. Eyes are darting when speaking. Patient is also staring at ceiling. Word content is slightly off but patient is able to answer orientation questions appropriately. Dr. Behroozi notified, urine drug screen ordered.

## 2021-08-02 NOTE — PLAN OF CARE
Problem: Adjustment to Illness (Heart Failure)  Goal: Optimal Coping  Outcome: Ongoing, Progressing  Intervention: Support and Optimize Psychosocial Response to Chronic Illness  Recent Flowsheet Documentation  Taken 8/1/2021 1923 by Stephania Patel, RN  Supportive Measures: active listening utilized   Goal Outcome Evaluation:  Plan of Care Reviewed With: patient        Progress: no change

## 2021-08-02 NOTE — TELEPHONE ENCOUNTER
08/02/2021, 0908 - Patient telephoned stating he is a current admission at [facility] due to excess fluid related to Acute On Chronic Congestive Heart Failure (admission date of 07/31/2021).  Patient stated he is scheduled for completion of an EGD on Tuesday, August 3, 2021 with an arrival time of 12:15 P.M.  Per Dr. Ronald Patel M.D. - Patient to cancel procedure at this time.  Patient verbalized understanding and made aware this staff member will contact [facility] and cancel procedure.

## 2021-08-02 NOTE — DISCHARGE SUMMARY
[facility]  DISCHARGE SUMMARY       Date of Admission: 7/31/2021  Date of Discharge:  8/2/2021  Primary Care Physician: Joslyn Rousseau APRN    Presenting Problem/History of Present Illness:  Acute on chronic congestive heart failure, unspecified heart failure type (CMS/HCC) [I50.9]       Final Discharge Diagnoses:  Active Hospital Problems    Diagnosis    • Acute on chronic congestive heart failure (CMS/HCC)        Consults:   Consults     Date and Time Order Name Status Description    7/20/2021  5:31 PM Inpatient Cardiology Consult Completed           Procedures Performed:                 Pertinent Test Results:   Lab Results (most recent)     Procedure Component Value Units Date/Time    Extra Tubes [[id number]] Collected: 08/02/21 0558    Specimen: Blood, Venous Line Updated: 08/02/21 1300    Narrative:      The following orders were created for panel order Extra Tubes.  Procedure                               Abnormality         Status                     ---------                               -----------         ------                     Gold Top - SST[[id number]]                                   Final result                 Please view results for these tests on the individual orders.    Gold Top - SST [[id number]] Collected: 08/02/21 0558    Specimen: Blood Updated: 08/02/21 1300     Extra Tube Hold for add-ons.     Comment: Auto resulted.       Basic Metabolic Panel [[id number]]  (Abnormal) Collected: 08/02/21 0557    Specimen: Blood Updated: 08/02/21 0706     Glucose 93 mg/dL      BUN 22 mg/dL      Creatinine 0.96 mg/dL      Sodium 130 mmol/L      Potassium 4.5 mmol/L      Chloride 93 mmol/L      CO2 26.0 mmol/L      Calcium 9.0 mg/dL      eGFR Non African Amer 87 mL/min/1.73      BUN/Creatinine Ratio 22.9     Anion Gap 11.0 mmol/L     Narrative:      GFR Normal >60  Chronic Kidney Disease <60  Kidney Failure <15      Urine Drug Screen - Urine,  Clean Catch [[id number]]  (Abnormal) Collected: 08/01/21 2142    Specimen: Urine, Clean Catch Updated: 08/01/21 2155     THC, Screen, Urine Negative     Phencyclidine (PCP), Urine Negative     Cocaine Screen, Urine Negative     Methamphetamine, Ur Negative     Opiate Screen Negative     Amphetamine Screen, Urine Positive     Benzodiazepine Screen, Urine Negative     Tricyclic Antidepressants Screen Negative     Methadone Screen, Urine Negative     Barbiturates Screen, Urine Negative     Oxycodone Screen, Urine Negative     Propoxyphene Screen Negative     Buprenorphine, Screen, Urine Negative    Narrative:      Cutoff For Drugs Screened:    Amphetamines               500 ng/ml  Barbiturates               200 ng/ml  Benzodiazepines            150 ng/ml  Cocaine                    150 ng/ml  Methadone                  200 ng/ml  Opiates                    100 ng/ml  Phencyclidine               25 ng/ml  THC                            50 ng/ml  Methamphetamine            500 ng/ml  Tricyclic Antidepressants  300 ng/ml  Oxycodone                  100 ng/ml  Propoxyphene               300 ng/ml  Buprenorphine               10 ng/ml    The normal value for all drugs tested is negative. This report includes unconfirmed screening results, with the cutoff values listed, to be used for medical treatment purposes only.  Unconfirmed results must not be used for non-medical purposes such as employment or legal testing.  Clinical consideration should be applied to any drug of abuse test, particularly when unconfirmed results are used.      Basic Metabolic Panel [[id number]]  (Abnormal) Collected: 08/01/21 0621    Specimen: Blood Updated: 08/01/21 0747     Glucose 93 mg/dL      BUN 21 mg/dL      Creatinine 0.89 mg/dL      Sodium 127 mmol/L      Potassium 4.8 mmol/L      Chloride 91 mmol/L      CO2 26.0 mmol/L      Calcium 8.7 mg/dL      eGFR Non African Amer 95 mL/min/1.73      BUN/Creatinine Ratio 23.6     Anion Gap 10.0 mmol/L      Narrative:      GFR Normal >60  Chronic Kidney Disease <60  Kidney Failure <15      Magnesium [[id number]]  (Normal) Collected: 08/01/21 0621    Specimen: Blood Updated: 08/01/21 0747     Magnesium 2.1 mg/dL     CBC & Differential [[id number]]  (Abnormal) Collected: 08/01/21 0621    Specimen: Blood Updated: 08/01/21 0716    Narrative:      The following orders were created for panel order CBC & Differential.  Procedure                               Abnormality         Status                     ---------                               -----------         ------                     CBC Auto Differential[[id number]]        Abnormal            Final result                 Please view results for these tests on the individual orders.    CBC Auto Differential [[id number]]  (Abnormal) Collected: 08/01/21 0621    Specimen: Blood Updated: 08/01/21 0716     WBC 7.72 10*3/mm3      RBC 3.88 10*6/mm3      Hemoglobin 10.7 g/dL      Hematocrit 33.1 %      MCV 85.3 fL      MCH 27.6 pg      MCHC 32.3 g/dL      RDW 17.6 %      RDW-SD 55.1 fl      MPV 10.6 fL      Platelets 197 10*3/mm3      Neutrophil % 76.6 %      Lymphocyte % 13.3 %      Monocyte % 8.0 %      Eosinophil % 1.4 %      Basophil % 0.4 %      Immature Grans % 0.3 %      Neutrophils, Absolute 5.91 10*3/mm3      Lymphocytes, Absolute 1.03 10*3/mm3      Monocytes, Absolute 0.62 10*3/mm3      Eosinophils, Absolute 0.11 10*3/mm3      Basophils, Absolute 0.03 10*3/mm3      Immature Grans, Absolute 0.02 10*3/mm3      nRBC 0.0 /100 WBC     Magnesium [[id number]]  (Normal) Collected: 07/31/21 0712    Specimen: Blood Updated: 07/31/21 0914     Magnesium 2.1 mg/dL     CBC Auto Differential [[id number]]  (Abnormal) Collected: 07/31/21 0712    Specimen: Blood Updated: 07/31/21 0906     WBC 4.97 10*3/mm3      RBC 4.09 10*6/mm3      Hemoglobin 11.2 g/dL      Hematocrit 35.5 %      MCV 86.8 fL      MCH 27.4 pg      MCHC 31.5 g/dL      RDW 18.2 %      RDW-SD 57.0 fl      MPV 10.9 fL       Platelets 226 10*3/mm3      Neutrophil % 61.4 %      Lymphocyte % 23.5 %      Monocyte % 11.9 %      Eosinophil % 2.4 %      Basophil % 0.6 %      Immature Grans % 0.2 %      Neutrophils, Absolute 3.05 10*3/mm3      Lymphocytes, Absolute 1.17 10*3/mm3      Monocytes, Absolute 0.59 10*3/mm3      Eosinophils, Absolute 0.12 10*3/mm3      Basophils, Absolute 0.03 10*3/mm3      Immature Grans, Absolute 0.01 10*3/mm3      nRBC 0.4 /100 WBC     COVID-19 and FLU A/B PCR - Swab, Nasopharynx [[id number]]  (Normal) Collected: 07/31/21 0358    Specimen: Swab from Nasopharynx Updated: 07/31/21 0430     COVID19 Not Detected     Influenza A PCR Not Detected     Influenza B PCR Not Detected    Narrative:      Fact sheet for providers: https://www.fda.gov/media/302468/download    Fact sheet for patients: https://www.fda.gov/media/085512/download    Test performed by PCR.    Troponin [[id number]]  (Normal) Collected: 07/31/21 0159    Specimen: Blood Updated: 07/31/21 0246     Troponin T <0.010 ng/mL     Narrative:      Troponin T Reference Range:  <= 0.03 ng/mL-   Negative for AMI  >0.03 ng/mL-     Abnormal for myocardial necrosis.  Clinicians would have to utilize clinical acumen, EKG, Troponin and serial changes to determine if it is an Acute Myocardial Infarction or myocardial injury due to an underlying chronic condition.       Results may be falsely decreased if patient taking Biotin.      Digoxin Level [[id number]]  (Abnormal) Collected: 07/31/21 0021    Specimen: Blood Updated: 07/31/21 0133     Digoxin 0.40 ng/mL     [place] Draw [[id number]] Collected: 07/31/21 0021    Specimen: Blood Updated: 07/31/21 0131    Narrative:      The following orders were created for panel order [place] Draw.  Procedure                               Abnormality         Status                     ---------                               -----------         ------                     Green Top (Gel)[[id number]]                                  Final  result               Lavender Top[[id number]]                                     Final result               Gold Top - SST[[id number]]                                   Final result                 Please view results for these tests on the individual orders.    Green Top (Gel) [[id number]] Collected: 07/31/21 0021    Specimen: Blood Updated: 07/31/21 0131     Extra Tube Hold for add-ons.     Comment: Auto resulted.       Lavender Top [[id number]] Collected: 07/31/21 0021    Specimen: Blood Updated: 07/31/21 0131     Extra Tube hold for add-on     Comment: Auto resulted       Gold Top - SST [[id number]] Collected: 07/31/21 0021    Specimen: Blood Updated: 07/31/21 0131     Extra Tube Hold for add-ons.     Comment: Auto resulted.       Lipase [[id number]]  (Normal) Collected: 07/31/21 0021    Specimen: Blood Updated: 07/31/21 0128     Lipase 41 U/L     Comprehensive Metabolic Panel [[id number]]  (Abnormal) Collected: 07/31/21 0021    Specimen: Blood Updated: 07/31/21 0109     Glucose 145 mg/dL      BUN 24 mg/dL      Creatinine 0.99 mg/dL      Sodium 128 mmol/L      Potassium 4.2 mmol/L      Chloride 94 mmol/L      CO2 25.0 mmol/L      Calcium 8.4 mg/dL      Total Protein 6.2 g/dL      Albumin 3.70 g/dL      ALT (SGPT) 101 U/L      AST (SGOT) 81 U/L      Alkaline Phosphatase 97 U/L      Total Bilirubin 1.7 mg/dL      eGFR Non African Amer 84 mL/min/1.73      Globulin 2.5 gm/dL      A/G Ratio 1.5 g/dL      BUN/Creatinine Ratio 24.2     Anion Gap 9.0 mmol/L     Narrative:      GFR Normal >60  Chronic Kidney Disease <60  Kidney Failure <15      Troponin [[id number]]  (Normal) Collected: 07/31/21 0021    Specimen: Blood Updated: 07/31/21 0108     Troponin T <0.010 ng/mL     Narrative:      Troponin T Reference Range:  <= 0.03 ng/mL-   Negative for AMI  >0.03 ng/mL-     Abnormal for myocardial necrosis.  Clinicians would have to utilize clinical acumen, EKG, Troponin and serial changes to determine if it is an Acute Myocardial  Infarction or myocardial injury due to an underlying chronic condition.       Results may be falsely decreased if patient taking Biotin.      BNP [[id number]]  (Abnormal) Collected: 07/31/21 0021    Specimen: Blood Updated: 07/31/21 0107     proBNP 7,254.0 pg/mL     Narrative:      Among patients with dyspnea, NT-proBNP is highly sensitive for the detection of acute congestive heart failure. In addition NT-proBNP of <300 pg/ml effectively rules out acute congestive heart failure with 99% negative predictive value.    Results may be falsely decreased if patient taking Biotin.      CBC & Differential [[id number]]  (Abnormal) Collected: 07/31/21 0021    Specimen: Blood Updated: 07/31/21 0041    Narrative:      The following orders were created for panel order CBC & Differential.  Procedure                               Abnormality         Status                     ---------                               -----------         ------                     CBC Auto Differential[[id number]]        Abnormal            Final result                 Please view results for these tests on the individual orders.        Imaging Results (Most Recent)     Procedure Component Value Units Date/Time    XR Chest 1 View [[id number]] Collected: 08/01/21 1313     Updated: 08/01/21 1511    Narrative:      EXAM: XR CHEST 1 VIEW    COMPARISONS: Radiograph 7/31/2021    INDICATION: shortness of breath, I50.9 Heart failure, unspecified    FINDINGS:  Frontal view of the chest.    Lungs are adequately expanded. No consolidation, effusion,  pneumothorax. Unchanged cardiomegaly. No acute osseous  abnormality. Single lead left cardiac pacer is unchanged.      Impression:      Stable exam with unchanged cardiomegaly.    Electronically signed by:  Carly Gonsalves MD  8/1/2021  1:46 PM CDT Workstation: [id number]    CT Abdomen Pelvis With Contrast [[id number]] Collected: 07/31/21 0221     Updated: 07/31/21 0317    Narrative:      CLINICAL  HISTORY:  abdominal pain    EXAMINATION:  CT ABDOMEN PELVIS W CONTRAST    COMPARISON:  10/9/2019    TECHNIQUE:   Thin section helical CT images were obtained through the abdomen  and pelvis.  Coronal and sagittal reformatted images were  obtained from helical CT data acquisition. The following CT was  done with automated exposure control. The mA and KVP were  adjusted to obtain quality images and lower patient dose  according to patient's size.    CONTRAST:  Images were obtained post IV contrast administration.    FINDINGS:    CT ABDOMEN:  Within the visualized lung bases there is no infiltrate or  consolidation. There is a small right pleural effusion. Heart is  enlarged.    The liver is overall within normal limits in size. There is  heterogeneity of the hepatic parenchymal enhancement as well as  some diminished attenuation. LFT correlation would be recommended  as hepatitis could account for the overall appearance. This could  be superimposed upon a mild degree of background fatty  infiltration. There is a small amount of perihepatic ascites also  evident as well as a small amount of ascites in the mesentery.     Increased generalized subcutaneous soft tissue edema as well as  enteric edema could be associated with anasarca.    The spleen is normal in size demonstrating no focal abnormality.  The appearance of the pancreas is within normal limits. The  gallbladder is contracted with no visualized calculus. The  adrenal glands are unremarkable. The kidneys enhance  symmetrically. There is no focal renal lesion or hydronephrosis.    The visualized portions of the colon and small bowel demonstrate  no inflammatory change. There is a  mild to moderate increased  amount of stool within the colon. There is no evidence of bowel  obstruction.    CT PELVIS:  There is a small volume of pelvic ascites. The appearance of the  urinary bladder is unremarkable. Prostate gland is within normal  limits in size. The visualized  "portions of the colon and small  bowel demonstrate no inflammatory change.        Impression:      *  Small volume of abdominal and pelvic ascites.  *  Generalized subcutaneous soft tissue and mesenteric edema  which could be associated with anasarca.  *  Small right pleural effusion.  *  Heterogeneity of the hepatic parenchymal enhancement. LFT  correlation would be recommended as hepatitis would be a  consideration given the appearance. Findings also suggestive of  underlying mild degree of fatty infiltration of the liver.  *  Mild-to-moderate increased stool within the colon.  *  No inflammatory bowel wall thickening identified within the  abdomen or pelvis.    Electronically signed by:  Salvatore Corcoran DO  7/31/2021 3:16 AM CDT  Workstation: [id number]    XR Chest 1 View [[id number]] Collected: 07/31/21 0050     Updated: 07/31/21 0120    Narrative:      EXAM:    XR Chest, 1 View    CLINICAL HISTORY:    The patient is 41 years old and is Male; Chest Pain triage  protocol Chest Pain Triage Protocol    TECHNIQUE:    Frontal view of the chest.    COMPARISON:    Chest radiograph July 20, 2021    FINDINGS:    LUNGS: Mild prominence of the interstitial markings is noted.   No consolidation.    PLEURAL SPACE:  Unremarkable.  No pneumothorax.    HEART:  The cardiac silhouette is enlarged.    MEDIASTINUM:  Unremarkable.    BONES/JOINTS:  Unremarkable.    TUBES, LINES AND DEVICES:  Left-sided pacemaker is present.      Impression:        Cardiomegaly without failure.    Electronically signed by:  Barbie Correia MD  7/31/2021 1:19 AM  CDT Workstation: [id number]          Chief Complaint on Day of Discharge: \"I have too much fluid on my legs\"    Hospital Course:  The patient is a 41 y.o. male who presented to [facility] ED on July 31, 2021 with complaints of weight gain and abdominal pain.  Upon initial evaluation in the ER the patient was found to be hemodynamically stable and maintaining adequate oxygen " "saturations on room air.  A CT abdomen pelvis was obtained which did not show acute abnormality.  Chest x-ray showed chronic cardiomegaly without acute failure.  He was referred to the hospitalist service for further observation and diuresis.  With dosing of IV Bumex patient remained hemodynamically stable and had notable improvement in his symptoms.  Objectively patient was found to have between 3 and 5 pound weight loss.  Abdominal pain resolved without intervention.  Today upon date of discharge as patient is told that it is felt he is stable for discharge home he states that he wants to contest.  He states he is unable to walk due to his peripheral edema.  However RN states that patient has been ambulating in the hallway throughout the nursing unit.  He additionally ambulates in the room in front of me without difficulty.  He states that he is too weak and tired to return home.  And that he would like to contest discharge.  I spoken with case management this who states that this is not the setting in which patient can contest discharge.  Further patient's case has been reviewed with a second hospitalist who agrees with plan for discharge home.  Patient is advised to continue  dosing of Bumex and monitor daily weights.  They are encouraged to follow-up with his PCP within 1 week as well as Dr. Perez as per previously scheduled.  Indications for more urgent evaluation reviewed with the patient and his mother at bedside.  He does voice understanding but continues to state he is not agreeable to this plan.  Condition on Discharge:  Stable    Physical Exam on Discharge:  /79 (BP Location: Left arm, Patient Position: Sitting)   Pulse 86   Temp 97.2 °F (36.2 °C) (Oral)   Resp 18   Ht 180.3 cm (70.98\")   Wt 91.9 kg (202 lb 9.6 oz)   SpO2 100%   BMI 28.27 kg/m²   Physical Exam  Vitals and nursing note reviewed.   Constitutional:       General: He is not in acute distress.  HENT:      Head: Normocephalic. "   Cardiovascular:      Rate and Rhythm: Normal rate.   Pulmonary:      Effort: Pulmonary effort is normal.      Comments: Diminished bilateral bases but otherwise clear  Abdominal:         Palpations: Abdomen is soft.      Tenderness: abdomen is nontender to palpation. There is no guarding or rebound.   Musculoskeletal:      Right lower leg: Edema present-trace to 1+      Left lower leg: Edema present. -trace to 1+  Skin:     General: Skin is warm and dry.   Neurological:      General: No focal deficit present.      Mental Status: He is alert and oriented to person, place, and time.   Psychiatric:         Behavior: Behavior normal.     Discharge Disposition:  Home or Self Care    Discharge Medications:     Discharge Medications      New Medications      Instructions Start Date   bumetanide 0.5 MG tablet  Commonly known as: BUMEX  Replaces: bumetanide 0.5 mg/mL oral suspension   1 mg, Oral, 2 Times Daily         Continue These Medications      Instructions Start Date   albuterol sulfate  (90 Base) MCG/ACT inhaler  Commonly known as: PROVENTIL HFA;VENTOLIN HFA;PROAIR HFA   2 puffs, Inhalation, Every 4 Hours PRN      Ambien 10 MG tablet  Generic drug: zolpidem   10 mg, Oral, Nightly PRN      apixaban 5 MG tablet tablet  Commonly known as: ELIQUIS   5 mg, Oral, Every 12 Hours Scheduled      Calcium Carbonate Antacid 1250 MG/5ML   650 mg, Oral, 2 times daily      carvedilol 3.125 MG tablet  Commonly known as: COREG   3.125 mg, Oral, 2 Times Daily With Meals      cholecalciferol 25 MCG (1000 UT) tablet  Commonly known as: VITAMIN D3   1,000 Units, Oral, Daily      dicyclomine 10 MG capsule  Commonly known as: BENTYL   10 mg, Oral, 4 Times Daily Before Meals & Nightly      digoxin 250 MCG tablet  Commonly known as: LANOXIN   250 mcg, Oral, Daily Digoxin      lidocaine-prilocaine 2.5-2.5 % cream  Commonly known as: EMLA   As Needed      Magnesium Oxide 400 (240 Mg) MG tablet   400 mg, Oral, 2 Times Daily       pantoprazole 40 MG EC tablet  Commonly known as: Protonix   40 mg, Oral, Daily      spironolactone 25 MG tablet  Commonly known as: ALDACTONE   12.5 mg, Oral, Daily      Vyvanse 70 MG capsule  Generic drug: lisdexamfetamine   70 mg, Oral, Every Morning, Pt takes 1/2 in the morning and 1/2 in the afternoon         Stop These Medications    bumetanide 0.5 mg/mL oral suspension  Replaced by: bumetanide 0.5 MG tablet     furosemide 80 MG tablet  Commonly known as: LASIX            Discharge Diet: Cardiac, 1500cc fluid restriction    Activity at Discharge: As tolerated    Follow-up Appointments:   PCP in one week  Dr. Perez as scheduled    Test Results Pending at Discharge:     Aracelis Booth MD    Time: Greater than 30 minutes spent preparing discharge

## 2021-08-03 ENCOUNTER — READMISSION MANAGEMENT (OUTPATIENT)
Dept: CALL CENTER | Facility: HOSPITAL | Age: 41
End: 2021-08-03

## 2021-08-03 NOTE — OUTREACH NOTE
Prep Survey      Responses   Gnosticism facility patient discharged from?  [place]   Is LACE score < 7 ?  No   Emergency Room discharge w/ pulse ox?  No   Eligibility  Readm Mgmt   Discharge diagnosis  Acute on chronic congestive heart failure    Does the patient have one of the following disease processes/diagnoses(primary or secondary)?  CHF   Does the patient have Home health ordered?  No   Is there a DME ordered?  No   Prep survey completed?  Yes          Veronica Robertson RN

## 2021-08-06 ENCOUNTER — READMISSION MANAGEMENT (OUTPATIENT)
Dept: CALL CENTER | Facility: HOSPITAL | Age: 41
End: 2021-08-06

## 2021-08-06 NOTE — OUTREACH NOTE
CHF Week 1 Survey      Responses   [facility] patient discharged from?  [place]   Does the patient have one of the following disease processes/diagnoses(primary or secondary)?  CHF   CHF Week 1 attempt successful?  No   Unsuccessful attempts  Attempt 1          Celena Cheung RN

## 2021-08-09 ENCOUNTER — READMISSION MANAGEMENT (OUTPATIENT)
Dept: CALL CENTER | Facility: HOSPITAL | Age: 41
End: 2021-08-09

## 2021-08-09 NOTE — OUTREACH NOTE
CHF Week 1 Survey      Responses   [facility] patient discharged from?  [place]   Does the patient have one of the following disease processes/diagnoses(primary or secondary)?  CHF   CHF Week 1 attempt successful?  No   Unsuccessful attempts  Attempt 2          Ciara Cheung RN

## 2021-08-13 ENCOUNTER — READMISSION MANAGEMENT (OUTPATIENT)
Dept: CALL CENTER | Facility: HOSPITAL | Age: 41
End: 2021-08-13

## 2021-08-13 NOTE — OUTREACH NOTE
CHF Week 2 Survey      Responses   [facility], [place] patient discharged from?  [place]   Does the patient have one of the following disease processes/diagnoses(primary or secondary)?  CHF   Week 2 attempt successful?  No   Unsuccessful attempts  Attempt 1          Tabatha Garcia LPN

## 2021-09-01 ENCOUNTER — HOME HEALTH ADMISSION (OUTPATIENT)
Dept: HOME HEALTH SERVICES | Facility: HOME HEALTHCARE | Age: 41
End: 2021-09-01

## 2021-10-23 ENCOUNTER — TELEMEDICINE (OUTPATIENT)
Dept: FAMILY MEDICINE CLINIC | Facility: TELEHEALTH | Age: 41
End: 2021-10-23

## 2021-10-23 DIAGNOSIS — R60.0 EDEMA OF BOTH LEGS: Primary | ICD-10-CM

## 2021-10-23 DIAGNOSIS — R11.0 NAUSEA: ICD-10-CM

## 2021-10-23 DIAGNOSIS — R06.02 MILD SHORTNESS OF BREATH: ICD-10-CM

## 2021-10-23 PROCEDURE — 99211 OFF/OP EST MAY X REQ PHY/QHP: CPT | Performed by: NURSE PRACTITIONER

## 2021-10-23 NOTE — PROGRESS NOTES
You have chosen to receive care through a telehealth visit.  Do you consent to use a video/audio connection for your medical care today? Yes     CHIEF COMPLAINT  Chief Complaint   Patient presents with   • Leg Swelling         HPI  Didier Dahl is a 41 y.o. male  presents with complaint of swelling in legs and stomach pain. Reports symptoms started a month ago. Now his legs are swelling worse and noticed redness on his legs like when he had cellulitis. Reports he was put on Doxy about a month ago for cellulitis. Denies fever or chills. + mild SOA. Reports he has a history of CHF. + nausea. No vomiting. Denies taking any medications for his symptoms.     Review of Systems   Constitutional: Positive for fatigue. Negative for chills and fever.   HENT: Negative for congestion, ear discharge, ear pain, sinus pressure, sinus pain and sore throat.    Respiratory: Positive for shortness of breath. Negative for cough, chest tightness and wheezing.         Mild SOA   Cardiovascular: Negative for chest pain.   Gastrointestinal: Positive for nausea. Negative for diarrhea and vomiting.   Musculoskeletal: Negative for back pain and myalgias.   Skin:        Redness and swelling to both legs   Neurological: Negative for dizziness and headaches.   Psychiatric/Behavioral: Negative.        Past Medical History:   Diagnosis Date   • Anxiety    • Callus    • Clotting disorder (HCC)    • Elevated liver enzymes    • GERD (gastroesophageal reflux disease)    • Heart trouble     zoll vest defibrillator   • Hypertension    • PE (pulmonary thromboembolism) (HCC)    • Pneumonia        Family History   Problem Relation Age of Onset   • Diabetes Other    • Heart disease Other    • Hypertension Other    • Anxiety disorder Other    • Allergies Other        Social History     Socioeconomic History   • Marital status: Single   Tobacco Use   • Smoking status: Former Smoker     Packs/day: 0.50     Years: 10.00     Pack years: 5.00     Types:  Cigarettes     Quit date: 3/1/2020     Years since quittin.6   • Smokeless tobacco: Never Used   Vaping Use   • Vaping Use: Former   • Substances: Flavoring   • Devices: Refillable tank   Substance and Sexual Activity   • Alcohol use: Yes     Comment: 2021 - Patient states he consumes alcoholic beverages occasionally.   • Drug use: No   • Sexual activity: Defer         There were no vitals taken for this visit.    PHYSICAL EXAM(patient guided exam)  Physical Exam   Constitutional: He is oriented to person, place, and time. He appears well-developed and well-nourished. No distress.   HENT:   Head: Normocephalic and atraumatic.   Right Ear: Hearing normal.   Left Ear: Hearing normal.   Nose: Nose normal.   Mouth/Throat: Mouth/Lips are normal.  Eyes: Conjunctivae and lids are normal.   Pulmonary/Chest: Effort normal.  No respiratory distress.  Abdominal: There is no abdominal tenderness.   Neurological: He is alert and oriented to person, place, and time. He has normal strength.   Skin:   Both legs with scattered redness. No warmth. + swelling to both legs (patient guided exam)   Psychiatric: He has a normal mood and affect. His speech is normal and behavior is normal.       Results for orders placed or performed during the hospital encounter of 21   COVID-19 and FLU A/B PCR - Swab, Nasopharynx    Specimen: Nasopharynx; Swab   Result Value Ref Range    COVID19 Not Detected Not Detected - Ref. Range    Influenza A PCR Not Detected Not Detected    Influenza B PCR Not Detected Not Detected   Troponin    Specimen: Blood   Result Value Ref Range    Troponin T <0.010 0.000 - 0.030 ng/mL   Troponin    Specimen: Blood   Result Value Ref Range    Troponin T <0.010 0.000 - 0.030 ng/mL   Comprehensive Metabolic Panel    Specimen: Blood   Result Value Ref Range    Glucose 145 (H) 65 - 99 mg/dL    BUN 24 (H) 6 - 20 mg/dL    Creatinine 0.99 0.76 - 1.27 mg/dL    Sodium 128 (L) 136 - 145 mmol/L    Potassium 4.2 3.5 -  5.2 mmol/L    Chloride 94 (L) 98 - 107 mmol/L    CO2 25.0 22.0 - 29.0 mmol/L    Calcium 8.4 (L) 8.6 - 10.5 mg/dL    Total Protein 6.2 6.0 - 8.5 g/dL    Albumin 3.70 3.50 - 5.20 g/dL    ALT (SGPT) 101 (H) 1 - 41 U/L    AST (SGOT) 81 (H) 1 - 40 U/L    Alkaline Phosphatase 97 39 - 117 U/L    Total Bilirubin 1.7 (H) 0.0 - 1.2 mg/dL    eGFR Non African Amer 84 >60 mL/min/1.73    Globulin 2.5 gm/dL    A/G Ratio 1.5 g/dL    BUN/Creatinine Ratio 24.2 7.0 - 25.0    Anion Gap 9.0 5.0 - 15.0 mmol/L   BNP    Specimen: Blood   Result Value Ref Range    proBNP 7,254.0 (H) 0.0 - 450.0 pg/mL   CBC Auto Differential    Specimen: Blood   Result Value Ref Range    WBC 5.33 3.40 - 10.80 10*3/mm3    RBC 3.90 (L) 4.14 - 5.80 10*6/mm3    Hemoglobin 10.8 (L) 13.0 - 17.7 g/dL    Hematocrit 33.4 (L) 37.5 - 51.0 %    MCV 85.6 79.0 - 97.0 fL    MCH 27.7 26.6 - 33.0 pg    MCHC 32.3 31.5 - 35.7 g/dL    RDW 17.9 (H) 12.3 - 15.4 %    RDW-SD 55.7 (H) 37.0 - 54.0 fl    MPV 10.2 6.0 - 12.0 fL    Platelets 221 140 - 450 10*3/mm3    Neutrophil % 67.1 42.7 - 76.0 %    Lymphocyte % 17.1 (L) 19.6 - 45.3 %    Monocyte % 13.1 (H) 5.0 - 12.0 %    Eosinophil % 1.5 0.3 - 6.2 %    Basophil % 0.8 0.0 - 1.5 %    Immature Grans % 0.4 0.0 - 0.5 %    Neutrophils, Absolute 3.58 1.70 - 7.00 10*3/mm3    Lymphocytes, Absolute 0.91 0.70 - 3.10 10*3/mm3    Monocytes, Absolute 0.70 0.10 - 0.90 10*3/mm3    Eosinophils, Absolute 0.08 0.00 - 0.40 10*3/mm3    Basophils, Absolute 0.04 0.00 - 0.20 10*3/mm3    Immature Grans, Absolute 0.02 0.00 - 0.05 10*3/mm3    nRBC 0.0 0.0 - 0.2 /100 WBC   Digoxin Level    Specimen: Blood   Result Value Ref Range    Digoxin 0.40 (L) 0.60 - 1.20 ng/mL   Lipase    Specimen: Blood   Result Value Ref Range    Lipase 41 13 - 60 U/L   CBC Auto Differential    Specimen: Blood   Result Value Ref Range    WBC 4.97 3.40 - 10.80 10*3/mm3    RBC 4.09 (L) 4.14 - 5.80 10*6/mm3    Hemoglobin 11.2 (L) 13.0 - 17.7 g/dL    Hematocrit 35.5 (L) 37.5 - 51.0 %     MCV 86.8 79.0 - 97.0 fL    MCH 27.4 26.6 - 33.0 pg    MCHC 31.5 31.5 - 35.7 g/dL    RDW 18.2 (H) 12.3 - 15.4 %    RDW-SD 57.0 (H) 37.0 - 54.0 fl    MPV 10.9 6.0 - 12.0 fL    Platelets 226 140 - 450 10*3/mm3    Neutrophil % 61.4 42.7 - 76.0 %    Lymphocyte % 23.5 19.6 - 45.3 %    Monocyte % 11.9 5.0 - 12.0 %    Eosinophil % 2.4 0.3 - 6.2 %    Basophil % 0.6 0.0 - 1.5 %    Immature Grans % 0.2 0.0 - 0.5 %    Neutrophils, Absolute 3.05 1.70 - 7.00 10*3/mm3    Lymphocytes, Absolute 1.17 0.70 - 3.10 10*3/mm3    Monocytes, Absolute 0.59 0.10 - 0.90 10*3/mm3    Eosinophils, Absolute 0.12 0.00 - 0.40 10*3/mm3    Basophils, Absolute 0.03 0.00 - 0.20 10*3/mm3    Immature Grans, Absolute 0.01 0.00 - 0.05 10*3/mm3    nRBC 0.4 (H) 0.0 - 0.2 /100 WBC   Basic Metabolic Panel    Specimen: Blood   Result Value Ref Range    Glucose 90 65 - 99 mg/dL    BUN 24 (H) 6 - 20 mg/dL    Creatinine 1.04 0.76 - 1.27 mg/dL    Sodium 123 (L) 136 - 145 mmol/L    Potassium 4.2 3.5 - 5.2 mmol/L    Chloride 87 (L) 98 - 107 mmol/L    CO2 23.0 22.0 - 29.0 mmol/L    Calcium 8.6 8.6 - 10.5 mg/dL    eGFR Non African Amer 80 >60 mL/min/1.73    BUN/Creatinine Ratio 23.1 7.0 - 25.0    Anion Gap 13.0 5.0 - 15.0 mmol/L   Magnesium    Specimen: Blood   Result Value Ref Range    Magnesium 2.1 1.6 - 2.6 mg/dL   Basic Metabolic Panel    Specimen: Blood   Result Value Ref Range    Glucose 93 65 - 99 mg/dL    BUN 21 (H) 6 - 20 mg/dL    Creatinine 0.89 0.76 - 1.27 mg/dL    Sodium 127 (L) 136 - 145 mmol/L    Potassium 4.8 3.5 - 5.2 mmol/L    Chloride 91 (L) 98 - 107 mmol/L    CO2 26.0 22.0 - 29.0 mmol/L    Calcium 8.7 8.6 - 10.5 mg/dL    eGFR Non African Amer 95 >60 mL/min/1.73    BUN/Creatinine Ratio 23.6 7.0 - 25.0    Anion Gap 10.0 5.0 - 15.0 mmol/L   Magnesium    Specimen: Blood   Result Value Ref Range    Magnesium 2.1 1.6 - 2.6 mg/dL   CBC Auto Differential    Specimen: Blood   Result Value Ref Range    WBC 7.72 3.40 - 10.80 10*3/mm3    RBC 3.88 (L) 4.14 - 5.80  10*6/mm3    Hemoglobin 10.7 (L) 13.0 - 17.7 g/dL    Hematocrit 33.1 (L) 37.5 - 51.0 %    MCV 85.3 79.0 - 97.0 fL    MCH 27.6 26.6 - 33.0 pg    MCHC 32.3 31.5 - 35.7 g/dL    RDW 17.6 (H) 12.3 - 15.4 %    RDW-SD 55.1 (H) 37.0 - 54.0 fl    MPV 10.6 6.0 - 12.0 fL    Platelets 197 140 - 450 10*3/mm3    Neutrophil % 76.6 (H) 42.7 - 76.0 %    Lymphocyte % 13.3 (L) 19.6 - 45.3 %    Monocyte % 8.0 5.0 - 12.0 %    Eosinophil % 1.4 0.3 - 6.2 %    Basophil % 0.4 0.0 - 1.5 %    Immature Grans % 0.3 0.0 - 0.5 %    Neutrophils, Absolute 5.91 1.70 - 7.00 10*3/mm3    Lymphocytes, Absolute 1.03 0.70 - 3.10 10*3/mm3    Monocytes, Absolute 0.62 0.10 - 0.90 10*3/mm3    Eosinophils, Absolute 0.11 0.00 - 0.40 10*3/mm3    Basophils, Absolute 0.03 0.00 - 0.20 10*3/mm3    Immature Grans, Absolute 0.02 0.00 - 0.05 10*3/mm3    nRBC 0.0 0.0 - 0.2 /100 WBC   Urine Drug Screen - Urine, Clean Catch    Specimen: Urine, Clean Catch   Result Value Ref Range    THC, Screen, Urine Negative Negative    Phencyclidine (PCP), Urine Negative Negative    Cocaine Screen, Urine Negative Negative    Methamphetamine, Ur Negative Negative    Opiate Screen Negative Negative    Amphetamine Screen, Urine Positive (A) Negative    Benzodiazepine Screen, Urine Negative Negative    Tricyclic Antidepressants Screen Negative Negative    Methadone Screen, Urine Negative Negative    Barbiturates Screen, Urine Negative Negative    Oxycodone Screen, Urine Negative Negative    Propoxyphene Screen Negative Negative    Buprenorphine, Screen, Urine Negative Negative   Basic Metabolic Panel    Specimen: Blood   Result Value Ref Range    Glucose 93 65 - 99 mg/dL    BUN 22 (H) 6 - 20 mg/dL    Creatinine 0.96 0.76 - 1.27 mg/dL    Sodium 130 (L) 136 - 145 mmol/L    Potassium 4.5 3.5 - 5.2 mmol/L    Chloride 93 (L) 98 - 107 mmol/L    CO2 26.0 22.0 - 29.0 mmol/L    Calcium 9.0 8.6 - 10.5 mg/dL    eGFR Non African Amer 87 >60 mL/min/1.73    BUN/Creatinine Ratio 22.9 7.0 - 25.0    Anion  Gap 11.0 5.0 - 15.0 mmol/L   ECG 12 Lead   Result Value Ref Range    QT Interval 352 ms    QTC Interval 411 ms   Green Top (Gel)   Result Value Ref Range    Extra Tube Hold for add-ons.    Lavender Top   Result Value Ref Range    Extra Tube hold for add-on    Gold Top - SST   Result Value Ref Range    Extra Tube Hold for add-ons.    Gold Top - SST   Result Value Ref Range    Extra Tube Hold for add-ons.        Diagnoses and all orders for this visit:    1. Edema of both legs (Primary)    2. Nausea    3. Mild shortness of breath    advised patient his symptoms could be from congestive heart failure. His proBNP on 7-31-21 was 7,254. Advised he should go to ER asap for in person evaluation and further testing.  Pt understands and reports he will have family to transport him.           Mary Beth Noriega, APRN  10/23/2021  19:07 EDT    This visit was performed via Telehealth.

## 2022-01-12 RX ORDER — PANTOPRAZOLE SODIUM 40 MG/1
40 TABLET, DELAYED RELEASE ORAL DAILY
Qty: 1 TABLET | Refills: 0 | OUTPATIENT
Start: 2022-01-12

## 2022-01-14 PROBLEM — I50.84 END STAGE HEART FAILURE (HCC): Status: ACTIVE | Noted: 2021-07-20

## 2022-01-14 PROBLEM — I48.0 PAF (PAROXYSMAL ATRIAL FIBRILLATION) (HCC): Status: ACTIVE | Noted: 2022-01-14

## (undated) DEVICE — KT INTRO MINISTICK MAX W/GW NITNL/TUNG ECHO 4F 21G 7CM

## (undated) DEVICE — PK PM 60

## (undated) DEVICE — SKIN AFFIX SURG ADHESIVE 72/CS 0.55ML: Brand: MEDLINE

## (undated) DEVICE — ELECTRODE,RT,STRESS,FOAM,50PK: Brand: MEDLINE